# Patient Record
Sex: FEMALE | Race: WHITE | NOT HISPANIC OR LATINO | Employment: FULL TIME | URBAN - METROPOLITAN AREA
[De-identification: names, ages, dates, MRNs, and addresses within clinical notes are randomized per-mention and may not be internally consistent; named-entity substitution may affect disease eponyms.]

---

## 2022-06-06 DIAGNOSIS — G89.29 CHRONIC HEEL PAIN, LEFT: Primary | ICD-10-CM

## 2022-06-06 DIAGNOSIS — M79.672 CHRONIC HEEL PAIN, LEFT: Primary | ICD-10-CM

## 2022-11-14 DIAGNOSIS — J01.90 ACUTE NON-RECURRENT SINUSITIS, UNSPECIFIED LOCATION: Primary | ICD-10-CM

## 2022-11-14 RX ORDER — AZITHROMYCIN 250 MG/1
TABLET, FILM COATED ORAL
Qty: 6 TABLET | Refills: 0 | Status: SHIPPED | OUTPATIENT
Start: 2022-11-14 | End: 2022-11-18

## 2023-01-05 DIAGNOSIS — L05.91 INFECTED PILONIDAL CYST: Primary | ICD-10-CM

## 2023-01-05 RX ORDER — CLINDAMYCIN HYDROCHLORIDE 150 MG/1
150 CAPSULE ORAL 3 TIMES DAILY
Qty: 21 CAPSULE | Refills: 0 | Status: SHIPPED | OUTPATIENT
Start: 2023-01-05 | End: 2023-01-12

## 2023-01-26 ENCOUNTER — ANNUAL EXAM (OUTPATIENT)
Dept: OBGYN CLINIC | Facility: CLINIC | Age: 28
End: 2023-01-26

## 2023-01-26 VITALS
HEIGHT: 65 IN | DIASTOLIC BLOOD PRESSURE: 82 MMHG | WEIGHT: 216 LBS | BODY MASS INDEX: 35.99 KG/M2 | SYSTOLIC BLOOD PRESSURE: 124 MMHG

## 2023-01-26 DIAGNOSIS — Z01.419 WELL WOMAN EXAM WITH ROUTINE GYNECOLOGICAL EXAM: Primary | ICD-10-CM

## 2023-01-26 DIAGNOSIS — Z30.41 ENCOUNTER FOR SURVEILLANCE OF CONTRACEPTIVE PILLS: ICD-10-CM

## 2023-01-26 RX ORDER — NORGESTIMATE AND ETHINYL ESTRADIOL 7DAYSX3 28
1 KIT ORAL DAILY
Qty: 84 TABLET | Refills: 4 | Status: SHIPPED | OUTPATIENT
Start: 2023-01-26

## 2023-01-26 NOTE — PROGRESS NOTES
ASSESSMENT & PLAN:   Diagnoses and all orders for this visit:    Well woman exam with routine gynecological exam    Encounter for surveillance of contraceptive pills  -     Tri-Estarylla 0 18/0 215/0 25 MG-35 MCG per tablet; Take 1 tablet by mouth daily          The following were reviewed in today's visit: ASCCP guidelines,  breast self exam, use and side effects of OCPs, family planning choices, exercise and healthy diet  Patient to return to office in yearly for annual exam      All questions have been answered to her satisfaction  CC:  Annual Gynecologic Examination  Chief Complaint   Patient presents with   • Gynecologic Exam     Pt here today for her annual exam pap 10/21/2021 wnl needs refills of her birth control       HPI: Hyman Lefort is a 33 y o  [place] who presents for annual gynecologic examination  She has the following concerns:  None   this fall  Not yet ready for a family but wants to know if there is a timeline for stopping birth control before trying for pregnancy  Health Maintenance:    Exercise: occasionally  Breast exams/breast awareness: yes  Diet: well balanced diet      Past Medical History:   Diagnosis Date   • GERD (gastroesophageal reflux disease)    • Ovarian cyst     last assessed 06/24/2014       Past Surgical History:   Procedure Laterality Date   • WISDOM TOOTH EXTRACTION  2018       Past OB/Gyn History:  Period Cycle (Days): 28  Period Duration (Days): 4-5  Period Pattern: Regular  Menstrual Flow: Moderate  Menstrual Control: TamponPatient's last menstrual period was 01/15/2023 (exact date)  Last Pap: 10/2021 : no abnormalities  History of abnormal Pap smear: no  HPV vaccine completed: yes    Patient is currently sexually active     STD testing: no  Current contraception: OCP (estrogen/progesterone)      Family History  Family History   Problem Relation Age of Onset   • Gallbladder disease Mother    • Migraines Mother    • Hypertension Father    • Endometriosis Sister         Partial Hyst   • Ulcerative colitis Maternal Grandmother    • Other Maternal Grandfather         cardiac disorder       Family history of uterine or ovarian cancer: no  Family history of breast cancer: no  Family history of colon cancer: no    Social History:  Social History     Socioeconomic History   • Marital status: Single     Spouse name: Not on file   • Number of children: Not on file   • Years of education: Not on file   • Highest education level: Not on file   Occupational History     Employer: [employer]   Tobacco Use   • Smoking status: Never   • Smokeless tobacco: Never   Vaping Use   • Vaping Use: Never used   Substance and Sexual Activity   • Alcohol use: Yes     Comment: socially   • Drug use: Never   • Sexual activity: Yes     Partners: Male     Birth control/protection: OCP   Other Topics Concern   • Not on file   Social History Narrative    Daily caffeine consumption, 1 serving per day    Exercises regularly     Social Determinants of Health     Financial Resource Strain: Not on file   Food Insecurity: Not on file   Transportation Needs: Not on file   Physical Activity: Not on file   Stress: Not on file   Social Connections: Not on file   Intimate Partner Violence: Not on file   Housing Stability: Not on file     Domestic violence screen: negative    Allergies: Allergies   Allergen Reactions   • Augmentin [Amoxicillin-Pot Clavulanate] Hives     Pt  Breaks up in hives       Medications:    Current Outpatient Medications:   •  Tri-Estarylla 0 18/0 215/0 25 MG-35 MCG per tablet, Take 1 tablet by mouth daily, Disp: 84 tablet, Rfl: 4    Review of Systems:  Denies fevers, chills, unintentional weight loss, excessive fatigue, chest pain, shortness of breath, abdominal pain, nausea, vomiting, urinary incontinence, urinary frequency, vaginal bleeding, vaginal discharge  All other systems negative unless otherwise stated         Physical Exam:  /82 (BP Location: Right arm, Patient Position: Sitting, Cuff Size: Standard)   Ht 5' 5" (1 651 m)   Wt 98 kg (216 lb)   LMP 01/15/2023 (Exact Date)   BMI 35 94 kg/m²  Body mass index is 35 94 kg/m²  GEN: The patient was alert and oriented x3, pleasant well-appearing female in no acute distress  HEENT:  Unremarkable, no anterior or posterior lymphadenopathy, no thyromegaly  CV:  Regular rate and rhythm, normal S1 and S2, no murmurs  RESP:  Clear to auscultation bilaterally, no wheezes, rales or rhonchi  BREAST:  Symmetric breasts with no palpable breast masses or obvious breast lesions  She has no retractions or nipple discharge  She has no axillary abnormalities or palpable masses  GI:  Soft, nontender, non-distended  MSK: bilateral lower extremities are nontender, no edema  : Normal appearing external female genitalia, normal appearing urethral meatus  Normal appearing vaginal epithelium  On bimanual exam, no cervical motion tenderness; uterus is smooth, mobile, nontender 6 weeks size  No tenderness or fullness in the bilateral adnexa

## 2023-02-08 ENCOUNTER — OFFICE VISIT (OUTPATIENT)
Dept: FAMILY MEDICINE CLINIC | Facility: CLINIC | Age: 28
End: 2023-02-08

## 2023-02-08 VITALS
TEMPERATURE: 98 F | SYSTOLIC BLOOD PRESSURE: 126 MMHG | BODY MASS INDEX: 36.52 KG/M2 | DIASTOLIC BLOOD PRESSURE: 82 MMHG | HEART RATE: 102 BPM | WEIGHT: 219.2 LBS | OXYGEN SATURATION: 99 % | HEIGHT: 65 IN

## 2023-02-08 DIAGNOSIS — R10.33 PERIUMBILICAL ABDOMINAL PAIN: Primary | ICD-10-CM

## 2023-02-08 NOTE — PROGRESS NOTES
Name: Alisha Badillo      : 1995      MRN: [id number]  Encounter Provider: IRAIS Herr  Encounter Date: 2023   Encounter department: [address] [place] [address]  Periumbilical abdominal pain  Discussed with patient plan to treat with conservative measures: ice applicstion and NSAIDS  Patient instructed to call if no improvement in 72 hours or symptoms worsen       Subjective      27 y  o female presenting with periumbilical discomfort for the past few days  She reports that the discomfort started after need to pump hard for bowel movement  She reports that she feels pressure and slight bulging sensation dependent on movement  She wants to make sure that she does not have a hernia  She also reports that her acid reflux has been acting up more often and she is using TUMS daily  Review of Systems   Constitutional: Negative  Respiratory: Negative  Cardiovascular: Negative  Gastrointestinal: Positive for abdominal pain  Negative for abdominal distention  Neurological: Negative  Psychiatric/Behavioral: Negative  Current Outpatient Medications on File Prior to Visit   Medication Sig   • Tri-Estarylla 0 18/0 215/0 25 MG-35 MCG per tablet Take 1 tablet by mouth daily       Objective     /82   Pulse 102   Temp 98 °F (36 7 °C)   Ht 5' 5" (1 651 m)   Wt 99 4 kg (219 lb 3 2 oz)   LMP 01/15/2023 (Exact Date)   SpO2 99%   BMI 36 48 kg/m²  (Reviewed)    Physical Exam  Vitals reviewed  Constitutional:       General: She is not in acute distress  Appearance: She is well-developed and well-groomed  She is not ill-appearing  HENT:      Head: Normocephalic and atraumatic  Eyes:      General: Lids are normal       Extraocular Movements: Extraocular movements intact  Conjunctiva/sclera: Conjunctivae normal       Pupils: Pupils are equal, round, and reactive to light     Neck:      Trachea: Trachea and phonation normal  Cardiovascular:      Rate and Rhythm: Normal rate and regular rhythm  Heart sounds: Normal heart sounds  Pulmonary:      Effort: Pulmonary effort is normal       Breath sounds: Normal breath sounds  Abdominal:      General: Bowel sounds are normal       Palpations: Abdomen is soft  Tenderness: There is abdominal tenderness in the periumbilical area  There is no guarding or rebound  Hernia: No hernia is present  Skin:     General: Skin is warm and dry  Capillary Refill: Capillary refill takes less than 2 seconds  Neurological:      General: No focal deficit present  Mental Status: She is alert and oriented to person, place, and time  Psychiatric:         Mood and Affect: Mood normal          Behavior: Behavior normal  Behavior is cooperative  Thought Content:  Thought content normal        Artist IRAIS Huber

## 2023-04-24 ENCOUNTER — RA CDI HCC (OUTPATIENT)
Dept: OTHER | Facility: HOSPITAL | Age: 28
End: 2023-04-24

## 2023-04-24 NOTE — PROGRESS NOTES
[facility]  [facility]       Chart reviewed, no opportunity found: CHART REVIEWED, NO OPPORTUNITY FOUND     Patients Insurance     Commercial Insurance: [address] [place] [address]

## 2023-05-08 ENCOUNTER — OFFICE VISIT (OUTPATIENT)
Dept: FAMILY MEDICINE CLINIC | Facility: CLINIC | Age: 28
End: 2023-05-08

## 2023-05-08 VITALS
DIASTOLIC BLOOD PRESSURE: 82 MMHG | HEART RATE: 103 BPM | SYSTOLIC BLOOD PRESSURE: 122 MMHG | BODY MASS INDEX: 36.65 KG/M2 | HEIGHT: 65 IN | TEMPERATURE: 97.3 F | WEIGHT: 220 LBS | OXYGEN SATURATION: 98 %

## 2023-05-08 DIAGNOSIS — Z13.6 SCREENING FOR CARDIOVASCULAR, RESPIRATORY, AND GENITOURINARY DISEASES: ICD-10-CM

## 2023-05-08 DIAGNOSIS — Z00.00 ANNUAL PHYSICAL EXAM: Primary | ICD-10-CM

## 2023-05-08 DIAGNOSIS — Z13.89 SCREENING FOR CARDIOVASCULAR, RESPIRATORY, AND GENITOURINARY DISEASES: ICD-10-CM

## 2023-05-08 DIAGNOSIS — Z13.83 SCREENING FOR CARDIOVASCULAR, RESPIRATORY, AND GENITOURINARY DISEASES: ICD-10-CM

## 2023-05-08 DIAGNOSIS — F41.1 GAD (GENERALIZED ANXIETY DISORDER): ICD-10-CM

## 2023-05-08 DIAGNOSIS — K21.9 GASTROESOPHAGEAL REFLUX DISEASE, UNSPECIFIED WHETHER ESOPHAGITIS PRESENT: ICD-10-CM

## 2023-05-08 RX ORDER — PANTOPRAZOLE SODIUM 20 MG/1
20 TABLET, DELAYED RELEASE ORAL
Qty: 30 TABLET | Refills: 1 | Status: SHIPPED | OUTPATIENT
Start: 2023-05-08 | End: 2023-11-04

## 2023-05-08 RX ORDER — BUSPIRONE HYDROCHLORIDE 5 MG/1
TABLET ORAL
Qty: 120 TABLET | Refills: 1 | Status: SHIPPED | OUTPATIENT
Start: 2023-05-08

## 2023-05-08 NOTE — ASSESSMENT & PLAN NOTE
Discussed with pt  Discussed started GERD diet  Start pantoprazole 20mg qd   Recheck 4-6w - earlier if not improving or worsening

## 2023-05-08 NOTE — ASSESSMENT & PLAN NOTE
I reviewed with pt  Start Buspar 5mg bid and taper up to 10mg bid  I reviewed side effects   Recheck 4-6w

## 2023-05-08 NOTE — PROGRESS NOTES
ADULT ANNUAL [address]    NAME: Shravan Spain  AGE: 33 y o  SEX: female  : 1995     DATE: 2023     Assessment and Plan:     Problem List Items Addressed This Visit        Digestive    Gastroesophageal reflux disease     Discussed with pt  Discussed started GERD diet  Start pantoprazole 20mg qd  Recheck 4-6w - earlier if not improving or worsening         Relevant Medications    pantoprazole (PROTONIX) 20 mg tablet    Other Relevant Orders    CBC and differential    Comprehensive metabolic panel       Other    TONEY (generalized anxiety disorder)     I reviewed with pt  Start Buspar 5mg bid and taper up to 10mg bid  I reviewed side effects  Recheck 4-6w         Relevant Medications    busPIRone (BUSPAR) 5 mg tablet    Other Relevant Orders    TSH, 3rd generation with Free T4 reflex   Other Visit Diagnoses     Annual physical exam    -  Primary    Screening for cardiovascular, respiratory, and genitourinary diseases        Relevant Orders    Comprehensive metabolic panel    Lipid panel          Immunizations and preventive care screenings were discussed with patient today  Appropriate education was printed on patient's after visit summary  Counseling:  · Exercise: the importance of regular exercise/physical activity was discussed  Recommend exercise 3-5 times per week for at least 30 minutes  No follow-ups on file  Chief Complaint:     Chief Complaint   Patient presents with   • Annual Exam   • Heartburn   • Anxiety      History of Present Illness:     Adult Annual Physical   Patient here for a comprehensive physical exam  The patient reports as below  - pt with frequent GERD symptoms, usually after lunch  Improves with TUMS  Pt does eat out a lot  Has tried to improve her diet recently which may have helped  - pt states that she has always had mild anxiety but finds that of the last year it has worsened    Patient now has difficulty with sleep, as well as increased anxiety in multiple areas including work  TONEY-7 done    Diet and Physical Activity  · Diet/Nutrition: poor diet and frequent junk food  · Exercise: no formal exercise  Depression Screening  PHQ-2/9 Depression Screening    Little interest or pleasure in doing things: 0 - not at all  Feeling down, depressed, or hopeless: 0 - not at all  PHQ-2 Score: 0  PHQ-2 Interpretation: Negative depression screen       General Health  · Sleep: sleeps poorly and gets 7-8 hours of sleep on average  · Hearing: normal - bilateral   · Vision: no vision problems  · Dental: regular dental visits and brushes teeth twice daily  /GYN Health  · Last menstrual period:at present  · Contraceptive method: oral contraceptives  · History of STDs?: no      Review of Systems:     Review of Systems   Constitutional: Negative  HENT: Negative  Eyes: Negative  Respiratory: Negative  Cardiovascular: Negative  Gastrointestinal: Negative for abdominal distention, abdominal pain, constipation, diarrhea, nausea and vomiting         (+) GERD   Endocrine: Negative  Genitourinary: Negative  Musculoskeletal: Negative  Skin: Negative  Allergic/Immunologic: Negative  Neurological: Negative  Hematological: Negative  Psychiatric/Behavioral: Positive for sleep disturbance  Negative for decreased concentration, dysphoric mood, self-injury and suicidal ideas  The patient is nervous/anxious         Past Medical History:     Past Medical History:   Diagnosis Date   • GERD (gastroesophageal reflux disease)    • Headache(784 0)     Stress headaches   • Ovarian cyst     last assessed 06/24/2014      Past Surgical History:     Past Surgical History:   Procedure Laterality Date   • WISDOM TOOTH EXTRACTION  2018      Social History:     Social History     Socioeconomic History   • Marital status: Single     Spouse name: None   • Number of children: None   • Years of education: None   • Highest education level: None   Occupational History     Employer: [employer]   Tobacco Use   • Smoking status: Never   • Smokeless tobacco: Never   Vaping Use   • Vaping Use: Never used   Substance and Sexual Activity   • Alcohol use: Yes     Alcohol/week: 2 0 standard drinks     Types: 2 Glasses of wine per week     Comment: I drink socially   • Drug use: Never   • Sexual activity: Yes     Partners: Male     Birth control/protection: OCP   Other Topics Concern   • None   Social History Narrative    Daily caffeine consumption, 1 serving per day    Exercises regularly     Social Determinants of Health     Financial Resource Strain: Not on file   Food Insecurity: Not on file   Transportation Needs: Not on file   Physical Activity: Not on file   Stress: Not on file   Social Connections: Not on file   Intimate Partner Violence: Not on file   Housing Stability: Not on file      Family History:     Family History   Problem Relation Age of Onset   • Gallbladder disease Mother    • Migraines Mother    • Hypertension Father    • Asthma Father    • Endometriosis Sister         Partial Hyst   • Ulcerative colitis Maternal Grandmother    • Other Maternal Grandfather         cardiac disorder      Current Medications:     Current Outpatient Medications   Medication Sig Dispense Refill   • busPIRone (BUSPAR) 5 mg tablet 1 tablet twice daily x4 days, then start 1-1/2 tab twice daily x4 days, then start 2 tabs twice daily 120 tablet 1   • pantoprazole (PROTONIX) 20 mg tablet Take 1 tablet (20 mg total) by mouth daily before breakfast 30 tablet 1   • Tri-Estarylla 0 18/0 215/0 25 MG-35 MCG per tablet Take 1 tablet by mouth daily 84 tablet 4     No current facility-administered medications for this visit  Allergies: Allergies   Allergen Reactions   • Augmentin [Amoxicillin-Pot Clavulanate] Hives     Pt   Breaks up in hives      Physical Exam:     /82 (BP Location: Left arm, Patient "Position: Sitting, Cuff Size: Large)   Pulse 103   Temp (!) 97 3 °F (36 3 °C)   Ht 5' 5\" (1 651 m)   Wt 99 8 kg (220 lb)   SpO2 98%   BMI 36 61 kg/m²     Physical Exam  Vitals reviewed  Constitutional:       Appearance: She is well-developed  HENT:      Head: Normocephalic and atraumatic  Right Ear: Tympanic membrane, ear canal and external ear normal       Left Ear: Tympanic membrane, ear canal and external ear normal       Nose: Nose normal       Mouth/Throat:      Mouth: Mucous membranes are moist    Eyes:      Extraocular Movements: Extraocular movements intact  Conjunctiva/sclera: Conjunctivae normal       Pupils: Pupils are equal, round, and reactive to light  Neck:      Thyroid: No thyromegaly  Vascular: No JVD  Comments: No thyromegaly  Cardiovascular:      Rate and Rhythm: Normal rate and regular rhythm  Pulses: Normal pulses  Heart sounds: Normal heart sounds  No murmur heard  Pulmonary:      Effort: Pulmonary effort is normal       Breath sounds: Normal breath sounds  No wheezing  Abdominal:      General: Bowel sounds are normal  There is no distension  Palpations: Abdomen is soft  There is no mass  Tenderness: There is no abdominal tenderness  Musculoskeletal:         General: No swelling, tenderness or deformity  Normal range of motion  Cervical back: Normal range of motion and neck supple  No tenderness  No muscular tenderness  Right lower leg: No edema  Left lower leg: No edema  Lymphadenopathy:      Cervical: No cervical adenopathy  Skin:     General: Skin is warm  Capillary Refill: Capillary refill takes less than 2 seconds  Neurological:      General: No focal deficit present  Mental Status: She is alert and oriented to person, place, and time  Cranial Nerves: No cranial nerve deficit  Sensory: No sensory deficit  Motor: No weakness or abnormal muscle tone        Coordination: Coordination normal  " Gait: Gait normal       Deep Tendon Reflexes: Reflexes normal    Psychiatric:         Mood and Affect: Mood normal          Behavior: Behavior normal          Thought Content: Thought content normal          Judgment: Judgment normal       Comments: PHQ-2/9 Depression Screening    Little interest or pleasure in doing things: 0 - not at all  Feeling down, depressed, or hopeless: 0 - not at all  PHQ-2 Score: 0  PHQ-2 Interpretation: Negative depression screen     TONEY-7 Flowsheet Screening    Flowsheet Row Most Recent Value   Over the last 2 weeks, how often have you been bothered by any of the   following problems?     Feeling nervous, anxious, or on edge 2   Not being able to stop or control worrying 3   Worrying too much about different things 2   Trouble relaxing 2   Being so restless that it is hard to sit still 0   Becoming easily annoyed or irritable 3   Feeling afraid as if something awful might happen 2   TONEY-7 Total Score 14           MD Issa MoralesValleywise [facility]

## 2023-06-20 ENCOUNTER — OFFICE VISIT (OUTPATIENT)
Dept: FAMILY MEDICINE CLINIC | Facility: CLINIC | Age: 28
End: 2023-06-20
Payer: COMMERCIAL

## 2023-06-20 VITALS
WEIGHT: 219 LBS | SYSTOLIC BLOOD PRESSURE: 110 MMHG | OXYGEN SATURATION: 98 % | TEMPERATURE: 96.2 F | DIASTOLIC BLOOD PRESSURE: 70 MMHG | BODY MASS INDEX: 36.49 KG/M2 | HEIGHT: 65 IN | HEART RATE: 78 BPM

## 2023-06-20 DIAGNOSIS — K21.9 GASTROESOPHAGEAL REFLUX DISEASE, UNSPECIFIED WHETHER ESOPHAGITIS PRESENT: ICD-10-CM

## 2023-06-20 DIAGNOSIS — F41.1 GAD (GENERALIZED ANXIETY DISORDER): Primary | ICD-10-CM

## 2023-06-20 PROCEDURE — 99214 OFFICE O/P EST MOD 30 MIN: CPT | Performed by: FAMILY MEDICINE

## 2023-06-20 NOTE — PROGRESS NOTES
Name: Alcides Levine      : 1995      MRN: [id number]  Encounter Provider: Carleen Howell MD  Encounter Date: 2023   Encounter department: [address] [place] [address] Road     1  TONEY (generalized anxiety disorder)  Assessment & Plan:  Patient doing very well on BuSpar 7 5 mg p o  twice daily  TONEY 7 score showed minimal anxiety  Continue present treatment  Recheck 6 months-earlier if needed      2  Gastroesophageal reflux disease, unspecified whether esophagitis present  Assessment & Plan:  Good control  Patient has been on this dose for 2 months  I will have her decrease pantoprazole to 10 mg a day  If doing well after 2 to 3 weeks, will switch her to an H2 blocker  Patient to call in 2 to 3 weeks with follow-up and return to office in 6 months-earlier if needed             Subjective     f/u multiple med issues  -Patient states that she is doing very well from an anxiety standpoint  She did not feel well on BuSpar 10 mg twice daily and has cut the dose back to 7 5 mg twice daily  Patient states that on this dose her anxiety is well controlled  PHQ and TONEY-7 done  -Patient also states that she has been stable in regards to her reflux since starting Protonix 20 mg a day  She has not needed Tums or any other rescue medication since starting this  She has been on this dose for 2 months  She denies any side effects     -Patient denies any cardiovascular, respiratory,  or other symptoms     -Patient has not had blood work done yet-    Review of Systems   Constitutional: Negative  HENT: Negative  Eyes: Negative  Respiratory: Negative  Cardiovascular: Negative  Gastrointestinal: Negative  Endocrine: Negative  Genitourinary: Negative  Musculoskeletal: Negative  Skin: Negative  Allergic/Immunologic: Negative  Neurological: Negative  Hematological: Negative  Psychiatric/Behavioral: Negative          Past Medical History: Diagnosis Date   • GERD (gastroesophageal reflux disease)    • Headache(784 0)     Stress headaches   • Ovarian cyst     last assessed 06/24/2014     Past Surgical History:   Procedure Laterality Date   • WISDOM TOOTH EXTRACTION  2018     Family History   Problem Relation Age of Onset   • Gallbladder disease Mother    • Migraines Mother    • Hypertension Father    • Asthma Father    • Endometriosis Sister         Partial Hyst   • Ulcerative colitis Maternal Grandmother    • Other Maternal Grandfather         cardiac disorder     Social History     Socioeconomic History   • Marital status: Single     Spouse name: None   • Number of children: None   • Years of education: None   • Highest education level: None   Occupational History     Employer: [employer]   Tobacco Use   • Smoking status: Never   • Smokeless tobacco: Never   Vaping Use   • Vaping Use: Never used   Substance and Sexual Activity   • Alcohol use:  Yes     Alcohol/week: 2 0 standard drinks of alcohol     Types: 2 Glasses of wine per week     Comment: I drink socially   • Drug use: Never   • Sexual activity: Yes     Partners: Male     Birth control/protection: OCP   Other Topics Concern   • None   Social History Narrative    Daily caffeine consumption, 1 serving per day    Exercises regularly     Social Determinants of Health     Financial Resource Strain: Not on file   Food Insecurity: Not on file   Transportation Needs: Not on file   Physical Activity: Not on file   Stress: Not on file   Social Connections: Not on file   Intimate Partner Violence: Not on file   Housing Stability: Not on file     Current Outpatient Medications on File Prior to Visit   Medication Sig   • busPIRone (BUSPAR) 5 mg tablet 1 tablet twice daily x4 days, then start 1-1/2 tab twice daily x4 days, then start 2 tabs twice daily   • pantoprazole (PROTONIX) 20 mg tablet Take 1 tablet (20 mg total) by mouth daily before breakfast   • Tri-Estarylla 0 18/0 215/0 25 "MG-35 MCG per tablet Take 1 tablet by mouth daily     Allergies   Allergen Reactions   • Augmentin [Amoxicillin-Pot Clavulanate] Hives     Pt  Breaks up in hives     Immunization History   Administered Date(s) Administered   • COVID-19 PFIZER VACCINE 0 3 ML IM 03/27/2021, 04/17/2021, 11/20/2021   • DTP 1995, 1995, 1995, 12/11/1997   • DTaP 5 05/30/2001   • HPV Quadrivalent 01/07/2010, 03/10/2010, 07/13/2010   • Hep B, adult 1995, 1996, 03/11/1997   • Hib (PRP-OMP) 1996, 1996, 1996, 12/11/1997   • IPV 1995, 1996, 1996, 05/30/2001   • MMR 08/25/1997, 05/30/2001   • Meningococcal, Unknown Serogroups 05/29/2008, 06/11/2014   • Tdap 05/29/2008, 06/11/2014   • Tuberculin Skin Test-PPD Intradermal 05/06/1997   • Varicella 02/09/1998, 05/28/2008       Objective     /70 (BP Location: Left arm, Patient Position: Sitting, Cuff Size: Adult)   Pulse 78   Temp (!) 96 2 °F (35 7 °C)   Ht 5' 5\" (1 651 m)   Wt 99 3 kg (219 lb)   SpO2 98%   BMI 36 44 kg/m²     Physical Exam  Vitals reviewed  HENT:      Mouth/Throat:      Mouth: Mucous membranes are moist    Eyes:      Extraocular Movements: Extraocular movements intact  Conjunctiva/sclera: Conjunctivae normal       Pupils: Pupils are equal, round, and reactive to light  Cardiovascular:      Rate and Rhythm: Normal rate and regular rhythm  Pulses: Normal pulses  Pulmonary:      Effort: Pulmonary effort is normal    Abdominal:      General: There is no distension  Palpations: There is no mass  Tenderness: There is no abdominal tenderness  Musculoskeletal:         General: No swelling or tenderness  Cervical back: No tenderness  Right lower leg: No edema  Left lower leg: No edema  Lymphadenopathy:      Cervical: No cervical adenopathy  Skin:     General: Skin is warm  Capillary Refill: Capillary refill takes less than 2 seconds     Neurological:      General: " No focal deficit present  Mental Status: She is alert and oriented to person, place, and time  Psychiatric:         Mood and Affect: Mood normal          Behavior: Behavior normal          Thought Content: Thought content normal          Judgment: Judgment normal       Comments: PHQ-2/9 Depression Screening    Little interest or pleasure in doing things: 0 - not at all  Feeling down, depressed, or hopeless: 0 - not at all  PHQ-2 Score: 0  PHQ-2 Interpretation: Negative depression screen     TONEY-7 Flowsheet Screening    Flowsheet Row Most Recent Value   Over the last 2 weeks, how often have you been bothered by any of the   following problems?     Feeling nervous, anxious, or on edge 1   Not being able to stop or control worrying 0   Worrying too much about different things 0   Trouble relaxing 0   Being so restless that it is hard to sit still 0   Becoming easily annoyed or irritable 1   Feeling afraid as if something awful might happen 1   TONEY-7 Total Score 3        Betty Hernández MD

## 2023-06-20 NOTE — ASSESSMENT & PLAN NOTE
Good control  Patient has been on this dose for 2 months  I will have her decrease pantoprazole to 10 mg a day  If doing well after 2 to 3 weeks, will switch her to an H2 blocker    Patient to call in 2 to 3 weeks with follow-up and return to office in 6 months-earlier if needed

## 2023-06-20 NOTE — ASSESSMENT & PLAN NOTE
Patient doing very well on BuSpar 7 5 mg p o  twice daily  TONEY 7 score showed minimal anxiety  Continue present treatment    Recheck 6 months-earlier if needed

## 2023-07-03 DIAGNOSIS — K21.9 GASTROESOPHAGEAL REFLUX DISEASE, UNSPECIFIED WHETHER ESOPHAGITIS PRESENT: ICD-10-CM

## 2023-07-03 DIAGNOSIS — F41.1 GAD (GENERALIZED ANXIETY DISORDER): ICD-10-CM

## 2023-07-03 RX ORDER — PANTOPRAZOLE SODIUM 20 MG/1
TABLET, DELAYED RELEASE ORAL
Qty: 30 TABLET | Refills: 1 | Status: SHIPPED | OUTPATIENT
Start: 2023-07-03

## 2023-07-03 RX ORDER — BUSPIRONE HYDROCHLORIDE 5 MG/1
TABLET ORAL
Qty: 120 TABLET | Refills: 1 | Status: SHIPPED | OUTPATIENT
Start: 2023-07-03

## 2023-07-30 LAB
ALBUMIN SERPL-MCNC: 4 G/DL (ref 3.6–5.1)
ALBUMIN/GLOB SERPL: 1.5 (CALC) (ref 1–2.5)
ALP SERPL-CCNC: 68 U/L (ref 31–125)
ALT SERPL-CCNC: 22 U/L (ref 6–29)
AST SERPL-CCNC: 17 U/L (ref 10–30)
BASOPHILS # BLD AUTO: 54 CELLS/UL (ref 0–200)
BASOPHILS NFR BLD AUTO: 0.6 %
BILIRUB SERPL-MCNC: 0.6 MG/DL (ref 0.2–1.2)
BUN SERPL-MCNC: 12 MG/DL (ref 7–25)
BUN/CREAT SERPL: NORMAL (CALC) (ref 6–22)
CALCIUM SERPL-MCNC: 9.2 MG/DL (ref 8.6–10.2)
CHLORIDE SERPL-SCNC: 104 MMOL/L (ref 98–110)
CHOLEST SERPL-MCNC: 243 MG/DL
CHOLEST/HDLC SERPL: 4.4 (CALC)
CO2 SERPL-SCNC: 23 MMOL/L (ref 20–32)
CREAT SERPL-MCNC: 0.8 MG/DL (ref 0.5–0.96)
EOSINOPHIL # BLD AUTO: 99 CELLS/UL (ref 15–500)
EOSINOPHIL NFR BLD AUTO: 1.1 %
ERYTHROCYTE [DISTWIDTH] IN BLOOD BY AUTOMATED COUNT: 13.2 % (ref 11–15)
GFR/BSA.PRED SERPLBLD CYS-BASED-ARV: 104 ML/MIN/1.73M2
GLOBULIN SER CALC-MCNC: 2.7 G/DL (CALC) (ref 1.9–3.7)
GLUCOSE SERPL-MCNC: 89 MG/DL (ref 65–99)
HCT VFR BLD AUTO: 40.2 % (ref 35–45)
HDLC SERPL-MCNC: 55 MG/DL
HGB BLD-MCNC: 13.5 G/DL (ref 11.7–15.5)
LDLC SERPL CALC-MCNC: 155 MG/DL (CALC)
LYMPHOCYTES # BLD AUTO: 2916 CELLS/UL (ref 850–3900)
LYMPHOCYTES NFR BLD AUTO: 32.4 %
MCH RBC QN AUTO: 30.6 PG (ref 27–33)
MCHC RBC AUTO-ENTMCNC: 33.6 G/DL (ref 32–36)
MCV RBC AUTO: 91.2 FL (ref 80–100)
MONOCYTES # BLD AUTO: 621 CELLS/UL (ref 200–950)
MONOCYTES NFR BLD AUTO: 6.9 %
NEUTROPHILS # BLD AUTO: 5310 CELLS/UL (ref 1500–7800)
NEUTROPHILS NFR BLD AUTO: 59 %
NONHDLC SERPL-MCNC: 188 MG/DL (CALC)
PLATELET # BLD AUTO: 243 THOUSAND/UL (ref 140–400)
PMV BLD REES-ECKER: 11 FL (ref 7.5–12.5)
POTASSIUM SERPL-SCNC: 4.3 MMOL/L (ref 3.5–5.3)
PROT SERPL-MCNC: 6.7 G/DL (ref 6.1–8.1)
RBC # BLD AUTO: 4.41 MILLION/UL (ref 3.8–5.1)
SODIUM SERPL-SCNC: 138 MMOL/L (ref 135–146)
TRIGL SERPL-MCNC: 189 MG/DL
TSH SERPL-ACNC: 2.36 MIU/L
WBC # BLD AUTO: 9 THOUSAND/UL (ref 3.8–10.8)

## 2023-08-15 ENCOUNTER — APPOINTMENT (EMERGENCY)
Dept: CT IMAGING | Facility: HOSPITAL | Age: 28
End: 2023-08-15
Payer: COMMERCIAL

## 2023-08-15 ENCOUNTER — HOSPITAL ENCOUNTER (EMERGENCY)
Facility: HOSPITAL | Age: 28
Discharge: HOME | End: 2023-08-15
Attending: EMERGENCY MEDICINE
Payer: COMMERCIAL

## 2023-08-15 ENCOUNTER — APPOINTMENT (EMERGENCY)
Dept: ULTRASOUND IMAGING | Facility: HOSPITAL | Age: 28
End: 2023-08-15
Payer: COMMERCIAL

## 2023-08-15 VITALS
TEMPERATURE: 98.4 F | HEART RATE: 96 BPM | RESPIRATION RATE: 18 BRPM | DIASTOLIC BLOOD PRESSURE: 77 MMHG | OXYGEN SATURATION: 96 % | SYSTOLIC BLOOD PRESSURE: 137 MMHG

## 2023-08-15 DIAGNOSIS — N39.0 UTI (URINARY TRACT INFECTION): ICD-10-CM

## 2023-08-15 DIAGNOSIS — R10.32 LLQ ABDOMINAL PAIN: Primary | ICD-10-CM

## 2023-08-15 LAB
ALBUMIN SERPL BCP-MCNC: 4.1 G/DL (ref 3.5–5)
ALP SERPL-CCNC: 65 U/L (ref 34–104)
ALT SERPL W P-5'-P-CCNC: 21 U/L (ref 7–52)
ANION GAP SERPL CALCULATED.3IONS-SCNC: 9 MMOL/L
AST SERPL W P-5'-P-CCNC: 18 U/L (ref 13–39)
BACTERIA UR QL AUTO: ABNORMAL /HPF
BASOPHILS # BLD AUTO: 0.04 THOUSANDS/ÂΜL (ref 0–0.1)
BASOPHILS NFR BLD AUTO: 0 % (ref 0–1)
BILIRUB SERPL-MCNC: 0.43 MG/DL (ref 0.2–1)
BILIRUB UR QL STRIP: NEGATIVE
BUN SERPL-MCNC: 9 MG/DL (ref 5–25)
CALCIUM SERPL-MCNC: 8.7 MG/DL (ref 8.4–10.2)
CHLORIDE SERPL-SCNC: 104 MMOL/L (ref 96–108)
CLARITY UR: ABNORMAL
CO2 SERPL-SCNC: 23 MMOL/L (ref 21–32)
COLOR UR: ABNORMAL
CREAT SERPL-MCNC: 0.78 MG/DL (ref 0.6–1.3)
EOSINOPHIL # BLD AUTO: 0.04 THOUSAND/ÂΜL (ref 0–0.61)
EOSINOPHIL NFR BLD AUTO: 0 % (ref 0–6)
ERYTHROCYTE [DISTWIDTH] IN BLOOD BY AUTOMATED COUNT: 13.6 % (ref 11.6–15.1)
EXT PREGNANCY TEST URINE: NEGATIVE
EXT. CONTROL: NORMAL
GFR SERPL CREATININE-BSD FRML MDRD: 104 ML/MIN/1.73SQ M
GLUCOSE SERPL-MCNC: 101 MG/DL (ref 65–140)
GLUCOSE UR STRIP-MCNC: NEGATIVE MG/DL
HCT VFR BLD AUTO: 39 % (ref 34.8–46.1)
HGB BLD-MCNC: 12.8 G/DL (ref 11.5–15.4)
HGB UR QL STRIP.AUTO: NEGATIVE
IMM GRANULOCYTES # BLD AUTO: 0.03 THOUSAND/UL (ref 0–0.2)
IMM GRANULOCYTES NFR BLD AUTO: 0 % (ref 0–2)
KETONES UR STRIP-MCNC: NEGATIVE MG/DL
LEUKOCYTE ESTERASE UR QL STRIP: NEGATIVE
LIPASE SERPL-CCNC: 15 U/L (ref 11–82)
LYMPHOCYTES # BLD AUTO: 3.02 THOUSANDS/ÂΜL (ref 0.6–4.47)
LYMPHOCYTES NFR BLD AUTO: 28 % (ref 14–44)
MCH RBC QN AUTO: 30 PG (ref 26.8–34.3)
MCHC RBC AUTO-ENTMCNC: 32.8 G/DL (ref 31.4–37.4)
MCV RBC AUTO: 91 FL (ref 82–98)
MONOCYTES # BLD AUTO: 0.74 THOUSAND/ÂΜL (ref 0.17–1.22)
MONOCYTES NFR BLD AUTO: 7 % (ref 4–12)
MUCOUS THREADS UR QL AUTO: ABNORMAL
NEUTROPHILS # BLD AUTO: 6.9 THOUSANDS/ÂΜL (ref 1.85–7.62)
NEUTS SEG NFR BLD AUTO: 65 % (ref 43–75)
NITRITE UR QL STRIP: NEGATIVE
NON-SQ EPI CELLS URNS QL MICRO: ABNORMAL /HPF
NRBC BLD AUTO-RTO: 0 /100 WBCS
PH UR STRIP.AUTO: 6 [PH]
PLATELET # BLD AUTO: 220 THOUSANDS/UL (ref 149–390)
PMV BLD AUTO: 10.5 FL (ref 8.9–12.7)
POTASSIUM SERPL-SCNC: 3.5 MMOL/L (ref 3.5–5.3)
PROT SERPL-MCNC: 7 G/DL (ref 6.4–8.4)
PROT UR STRIP-MCNC: ABNORMAL MG/DL
RBC # BLD AUTO: 4.27 MILLION/UL (ref 3.81–5.12)
RBC #/AREA URNS AUTO: ABNORMAL /HPF
SODIUM SERPL-SCNC: 136 MMOL/L (ref 135–147)
SP GR UR STRIP.AUTO: 1.02 (ref 1–1.03)
UROBILINOGEN UR STRIP-ACNC: <2 MG/DL
WBC # BLD AUTO: 10.77 THOUSAND/UL (ref 4.31–10.16)
WBC #/AREA URNS AUTO: ABNORMAL /HPF

## 2023-08-15 PROCEDURE — 74176 CT ABD & PELVIS W/O CONTRAST: CPT

## 2023-08-15 PROCEDURE — 99284 EMERGENCY DEPT VISIT MOD MDM: CPT | Performed by: PHYSICIAN ASSISTANT

## 2023-08-15 PROCEDURE — 76856 US EXAM PELVIC COMPLETE: CPT

## 2023-08-15 PROCEDURE — 76830 TRANSVAGINAL US NON-OB: CPT

## 2023-08-15 PROCEDURE — 83690 ASSAY OF LIPASE: CPT

## 2023-08-15 PROCEDURE — 85025 COMPLETE CBC W/AUTO DIFF WBC: CPT

## 2023-08-15 PROCEDURE — 81001 URINALYSIS AUTO W/SCOPE: CPT | Performed by: PHYSICIAN ASSISTANT

## 2023-08-15 PROCEDURE — 36415 COLL VENOUS BLD VENIPUNCTURE: CPT

## 2023-08-15 PROCEDURE — 99284 EMERGENCY DEPT VISIT MOD MDM: CPT

## 2023-08-15 PROCEDURE — 81025 URINE PREGNANCY TEST: CPT | Performed by: PHYSICIAN ASSISTANT

## 2023-08-15 PROCEDURE — G1004 CDSM NDSC: HCPCS

## 2023-08-15 PROCEDURE — 96374 THER/PROPH/DIAG INJ IV PUSH: CPT

## 2023-08-15 PROCEDURE — 80053 COMPREHEN METABOLIC PANEL: CPT

## 2023-08-15 RX ORDER — CEFUROXIME AXETIL 500 MG/1
500 TABLET ORAL EVERY 12 HOURS SCHEDULED
Qty: 10 TABLET | Refills: 0 | Status: SHIPPED | OUTPATIENT
Start: 2023-08-15 | End: 2023-08-20

## 2023-08-15 RX ORDER — KETOROLAC TROMETHAMINE 30 MG/ML
15 INJECTION, SOLUTION INTRAMUSCULAR; INTRAVENOUS ONCE
Status: COMPLETED | OUTPATIENT
Start: 2023-08-15 | End: 2023-08-15

## 2023-08-15 RX ORDER — CEFUROXIME AXETIL 250 MG/1
500 TABLET ORAL EVERY 12 HOURS SCHEDULED
Status: DISCONTINUED | OUTPATIENT
Start: 2023-08-15 | End: 2023-08-16 | Stop reason: HOSPADM

## 2023-08-15 RX ADMIN — CEFUROXIME AXETIL 500 MG: 250 TABLET ORAL at 22:01

## 2023-08-15 RX ADMIN — KETOROLAC TROMETHAMINE 15 MG: 30 INJECTION, SOLUTION INTRAMUSCULAR; INTRAVENOUS at 22:02

## 2023-08-16 NOTE — DISCHARGE INSTRUCTIONS
Take the prescribed antibiotics as directed for the full duration of their course. Schedule an appointment for follow-up with your primary care provider in the next 5 to 7 days. Return to the ER if you develop fever, new or worsening pain, vomiting, difficulty breathing, blood in vomit or stool or urine, weakness, confusion, or lethargy.

## 2023-08-16 NOTE — ED PROVIDER NOTES
History  Chief Complaint   Patient presents with   • Abdominal Pain     Pt reports LLQ abd pain since Friday, hx of ovarian cyst 10 years ago, recently stopped birth control, denie abnormal bleeding, +nausea over weekend, also late for period     81-year-old female presents the emergency department with complaints of abdominal pain. States that she has had some left-sided lower abdominal pain over the past 4 to 5 days. No fevers at home. Denies nausea or vomiting. States that she has a history of ovarian cysts approximately 10 years ago without recurrence. Did recently stop her birth control pill. States that her period is currently 8 by several days. LMP 23. Reports that her periods are fairly regular, and that this is unusual for her. Notes that she has taken several home pregnancy tests which were all negative. Some mild nausea recently without vomiting at home. No fevers. Denies urinary symptoms. History provided by:  Patient   used: No    Abdominal Pain  Pain location:  LLQ  Pain quality: cramping and sharp    Pain radiates to:  Does not radiate  Pain severity:  Moderate  Onset quality:  Gradual  Duration:  5 days  Timing:  Constant  Progression:  Waxing and waning  Chronicity:  New  Relieved by:  Nothing  Worsened by:  Nothing  Ineffective treatments:  OTC medications  Associated symptoms: nausea    Associated symptoms: no anorexia, no belching, no chest pain, no chills, no constipation, no cough, no diarrhea, no dysuria, no fatigue, no fever, no flatus, no hematemesis, no hematochezia, no hematuria, no melena, no shortness of breath, no sore throat, no vaginal bleeding and no vomiting        Prior to Admission Medications   Prescriptions Last Dose Informant Patient Reported? Taking?    Tri-Estarylla 0.18/0.215/0.25 MG-35 MCG per tablet   No No   Sig: Take 1 tablet by mouth daily   busPIRone (BUSPAR) 5 mg tablet   No No   Si TABLET TWICE DAILY X4 DAYS, THEN 1 & /2 TAB TWICE DAILY X 4 DAYS, THEN 2 TABS TWICE DAILY   pantoprazole (PROTONIX) 20 mg tablet   No No   Sig: TAKE 1 TABLET BY MOUTH DAILY BEFORE BREAKFAST. Facility-Administered Medications: None       Past Medical History:   Diagnosis Date   • GERD (gastroesophageal reflux disease)    • Headache(784.0)     Stress headaches   • Ovarian cyst     last assessed 06/24/2014       Past Surgical History:   Procedure Laterality Date   • WISDOM TOOTH EXTRACTION  2018       Family History   Problem Relation Age of Onset   • Gallbladder disease Mother    • Migraines Mother    • Hypertension Father    • Asthma Father    • Endometriosis Sister         Partial Hyst   • Ulcerative colitis Maternal Grandmother    • Other Maternal Grandfather         cardiac disorder     I have reviewed and agree with the history as documented. E-Cigarette/Vaping   • E-Cigarette Use Never User      E-Cigarette/Vaping Substances   • Nicotine No    • THC No    • CBD No    • Flavoring No    • Other No    • Unknown No      Social History     Tobacco Use   • Smoking status: Never   • Smokeless tobacco: Never   Vaping Use   • Vaping Use: Never used   Substance Use Topics   • Alcohol use: Yes     Alcohol/week: 2.0 standard drinks of alcohol     Types: 2 Glasses of wine per week     Comment: I drink socially   • Drug use: Never       Review of Systems   Constitutional: Negative for activity change, appetite change, chills, fatigue and fever. HENT: Negative for congestion, dental problem, drooling, ear discharge, ear pain, mouth sores, nosebleeds, rhinorrhea, sore throat and trouble swallowing. Eyes: Negative for pain, discharge and itching. Respiratory: Negative for cough, chest tightness, shortness of breath and wheezing. Cardiovascular: Negative for chest pain and palpitations. Gastrointestinal: Positive for abdominal pain and nausea.  Negative for anorexia, blood in stool, constipation, diarrhea, flatus, hematemesis, hematochezia, melena and vomiting. Endocrine: Negative for cold intolerance and heat intolerance. Genitourinary: Negative for difficulty urinating, dysuria, flank pain, frequency, hematuria, urgency and vaginal bleeding. Skin: Negative for rash and wound. Allergic/Immunologic: Negative for food allergies and immunocompromised state. Neurological: Negative for dizziness, seizures, syncope, weakness, numbness and headaches. Psychiatric/Behavioral: Negative for agitation, behavioral problems and confusion. Physical Exam  Physical Exam  Vitals and nursing note reviewed. Constitutional:       Appearance: Normal appearance. She is well-developed. HENT:      Head: Normocephalic and atraumatic. Right Ear: Hearing and external ear normal.      Left Ear: Hearing and external ear normal.      Nose: Nose normal.   Eyes:      General: Lids are normal.      Conjunctiva/sclera: Conjunctivae normal.   Neck:      Trachea: Trachea normal.   Cardiovascular:      Rate and Rhythm: Normal rate and regular rhythm. Heart sounds: Normal heart sounds. No murmur heard. No friction rub. No gallop. Pulmonary:      Effort: Pulmonary effort is normal. No respiratory distress. Breath sounds: Normal breath sounds. No decreased breath sounds, wheezing, rhonchi or rales. Abdominal:      Tenderness: There is abdominal tenderness in the left lower quadrant. There is no right CVA tenderness or left CVA tenderness. Musculoskeletal:         General: Normal range of motion. Cervical back: Normal range of motion. Skin:     General: Skin is warm and dry. Findings: No erythema or rash. Neurological:      General: No focal deficit present. Mental Status: She is alert and oriented to person, place, and time.    Psychiatric:         Mood and Affect: Mood normal.         Behavior: Behavior normal.         Vital Signs  ED Triage Vitals   Temperature Pulse Respirations Blood Pressure SpO2   08/15/23 1645 08/15/23 1645 08/15/23 1645 08/15/23 1645 08/15/23 1645   98.4 °F (36.9 °C) (!) 110 18 162/87 96 %      Temp Source Heart Rate Source Patient Position - Orthostatic VS BP Location FiO2 (%)   08/15/23 1645 08/15/23 1645 08/15/23 1645 08/15/23 1645 --   Oral Monitor Sitting Right arm       Pain Score       08/15/23 2202       5           Vitals:    08/15/23 1645 08/15/23 1800 08/15/23 1900 08/15/23 2100   BP: 162/87 143/75 137/77    Pulse: (!) 110 101 104 96   Patient Position - Orthostatic VS: Sitting Sitting Sitting          Visual Acuity      ED Medications  Medications   ketorolac (TORADOL) injection 15 mg (15 mg Intravenous Given 8/15/23 2202)       Diagnostic Studies  Results Reviewed     Procedure Component Value Units Date/Time    Urine Microscopic [[id number]]  (Abnormal) Collected: 08/15/23 1728    Lab Status: Final result Specimen: Urine, Clean Catch Updated: 08/15/23 1823     RBC, UA 1-2 /hpf      WBC, UA 2-4 /hpf      Epithelial Cells Moderate /hpf      Bacteria, UA Innumerable /hpf      MUCUS THREADS Occasional    Comprehensive metabolic panel [[id number]] Collected: 08/15/23 1728    Lab Status: Final result Specimen: Blood from Arm, Right Updated: 08/15/23 1753     Sodium 136 mmol/L      Potassium 3.5 mmol/L      Chloride 104 mmol/L      CO2 23 mmol/L      ANION GAP 9 mmol/L      BUN 9 mg/dL      Creatinine 0.78 mg/dL      Glucose 101 mg/dL      Calcium 8.7 mg/dL      AST 18 U/L      ALT 21 U/L      Alkaline Phosphatase 65 U/L      Total Protein 7.0 g/dL      Albumin 4.1 g/dL      Total Bilirubin 0.43 mg/dL      eGFR 104 ml/min/1.73sq m     Narrative:      [place] guidelines for Chronic Kidney Disease (CKD):   •  Stage 1 with normal or high GFR (GFR > 90 mL/min/1.73 square meters)  •  Stage 2 Mild CKD (GFR = 60-89 mL/min/1.73 square meters)  •  Stage 3A Moderate CKD (GFR = 45-59 mL/min/1.73 square meters)  •  Stage 3B Moderate CKD (GFR = 30-44 mL/min/1.73 square meters)  •  Stage 4 Severe CKD (GFR = 15-29 mL/min/1.73 square meters)  •  Stage 5 End Stage CKD (GFR <15 mL/min/1.73 square meters)  Note: GFR calculation is accurate only with a steady state creatinine    Lipase [[id number]]  (Normal) Collected: 08/15/23 1728    Lab Status: Final result Specimen: Blood from Arm, Right Updated: 08/15/23 1753     Lipase 15 u/L     CBC and differential [[id number]]  (Abnormal) Collected: 08/15/23 1728    Lab Status: Final result Specimen: Blood from Arm, Right Updated: 08/15/23 1743     WBC 10.77 Thousand/uL      RBC 4.27 Million/uL      Hemoglobin 12.8 g/dL      Hematocrit 39.0 %      MCV 91 fL      MCH 30.0 pg      MCHC 32.8 g/dL      RDW 13.6 %      MPV 10.5 fL      Platelets 636 Thousands/uL      nRBC 0 /100 WBCs      Neutrophils Relative 65 %      Immat GRANS % 0 %      Lymphocytes Relative 28 %      Monocytes Relative 7 %      Eosinophils Relative 0 %      Basophils Relative 0 %      Neutrophils Absolute 6.90 Thousands/µL      Immature Grans Absolute 0.03 Thousand/uL      Lymphocytes Absolute 3.02 Thousands/µL      Monocytes Absolute 0.74 Thousand/µL      Eosinophils Absolute 0.04 Thousand/µL      Basophils Absolute 0.04 Thousands/µL     UA w Reflex to Microscopic w Reflex to Culture [[id number]]  (Abnormal) Collected: 08/15/23 1728    Lab Status: Final result Specimen: Urine, Clean Catch Updated: 08/15/23 1738     Color, UA Light Yellow     Clarity, UA Turbid     Specific Gravity, UA 1.019     pH, UA 6.0     Leukocytes, UA Negative     Nitrite, UA Negative     Protein, UA Trace mg/dl      Glucose, UA Negative mg/dl      Ketones, UA Negative mg/dl      Urobilinogen, UA <2.0 mg/dl      Bilirubin, UA Negative     Occult Blood, UA Negative    POCT pregnancy, urine [[id number]]  (Normal) Resulted: 08/15/23 1731    Lab Status: Final result Updated: 08/15/23 1731     EXT Preg Test, Ur Negative     Control Valid                 CT renal stone study abdomen pelvis without contrast   Final Result by Elder Santos MD (08/15 2112)      No evidence of urinary tract calculi. No hydronephrosis. Workstation performed: [id number]         US pelvis complete w transvaginal   Final Result by Luis Angel Benito MD (08/15 1910)      Pelvic ultrasound exam within normal limits. Workstation performed: [id number]                    Procedures  Procedures         ED Course                                             Medical Decision Making  Differential diagnosis includes but not limited to: Ovarian cyst, pregnancy, ectopic pregnancy, urinary tract infection, kidney stone, diverticular disease    LLQ abdominal pain: acute illness or injury  UTI (urinary tract infection): acute illness or injury  Amount and/or Complexity of Data Reviewed  Labs: ordered. Decision-making details documented in ED Course. Radiology: ordered. Decision-making details documented in ED Course. Risk  Prescription drug management. Disposition  Final diagnoses:   LLQ abdominal pain   UTI (urinary tract infection)     Time reflects when diagnosis was documented in both MDM as applicable and the Disposition within this note     Time User Action Codes Description Comment    8/15/2023  9:18 PM Fredricka Jeans [R10.32] LLQ abdominal pain     8/15/2023  9:18 PM [address] [N39.0] UTI (urinary tract infection)       ED Disposition     ED Disposition   Discharge    Condition   Stable    Date/Time   Tue Aug 15, 2023  9:18 PM    [address] discharge to home/self care. Follow-up Information     Follow up With Specialties Details Why Contact Info Additional Information    Galina Goncalves MD [facility] Medicine Schedule an appointment as soon as possible for a visit on 8/21/2023  [address] [facility].   [facility] Emergency Department Emergency Medicine Go to  If symptoms worsen [address] Emergency Department, [facility] [address], [address]          Discharge Medication List as of 8/15/2023  9:29 PM      START taking these medications    Details   cefuroxime (CEFTIN) 500 mg tablet Take 1 tablet (500 mg total) by mouth every 12 (twelve) hours for 5 days, Starting Tue 8/15/2023, Until Sun 8/20/2023, Normal         CONTINUE these medications which have NOT CHANGED    Details   busPIRone (BUSPAR) 5 mg tablet 1 TABLET TWICE DAILY X4 DAYS, THEN 1 & 1/2 TAB TWICE DAILY X 4 DAYS, THEN 2 TABS TWICE DAILY, Normal      pantoprazole (PROTONIX) 20 mg tablet TAKE 1 TABLET BY MOUTH DAILY BEFORE BREAKFAST., Normal      Tri-Estarylla 0.18/0.215/0.25 MG-35 MCG per tablet Take 1 tablet by mouth daily, Starting Thu 1/26/2023, Normal             No discharge procedures on file.     PDMP Review     None          ED Provider  Electronically Signed by           Babak Mckinley PA-C  08/17/23 6048

## 2023-08-16 NOTE — ED CARE HANDOFF
Emergency Department Sign Out Note        [facility] See Separate Emergency Department note. The patient, Julian Meidna, was evaluated by the previous provider for LLQ abdominal pain. Workup Completed:  CMP, CBC, lipase, UA, urine micro    ED Course / Workup Pending (followup):  CT renal stone study                                  ED Course as of 08/16/23 0015   Tue Aug 15, 2023   2107 Comprehensive metabolic panel  No electrolyte derangement, no CARMEN, normal random glucose, no transaminitis   2107 Lipase: 15  Acute pancreatitis less likely   2107 WBC(!): 10.77  Mild leukocytosis   2107 CBC and differential(!)  No gross anemia   2107 UA w Reflex to Microscopic w Reflex to Culture(!)  Trace proteinuria   2107 Urine Microscopic(!)  Pending CT renal stone study, plan for antibiotics for suspected UTI   2107 PREGNANCY TEST URINE: Negative  Noted negative pregnancy test   2108 US pelvis complete w transvaginal  FINDINGS:     UTERUS:  The uterus is anteverted in position, measuring 8.3 x 4.2 x 5.1 cm. The uterus has a normal contour and echotexture. The cervix appears within normal limits.     ENDOMETRIUM:  The endometrial echo complex has an AP caliber of 14.0 mm. Its appearance is within normal limits for age and cycle and shows no filling defects.     OVARIES/ADNEXA:  Right ovary:  4.5 x 1.7 x 1.7 cm. 6.6 mL. Left ovary:  3.7 x 3.0 x 4.0 cm. 23.2 mL. 2.4 x 1.9 x 2 cm dominant follicle left ovary  Ovarian Doppler flow is within normal limits. No suspicious ovarian or adnexal abnormality.     OTHER:  Trace amount of free fluid in the pelvis. IMPRESSION:     Pelvic ultrasound exam within normal limits. 2118 CT renal stone study abdomen pelvis without contrast  URINARY TRACT FINDINGS:     RIGHT KIDNEY AND URETER:  No urinary tract calculi. No hydronephrosis or hydroureter.     LEFT KIDNEY AND URETER:  No urinary tract calculi.   No hydronephrosis or hydroureter.     URINARY BLADDER:  Unremarkable.        ADDITIONAL FINDINGS:     LOWER CHEST:  No clinically significant abnormality identified in the visualized lower chest.     SOLID VISCERA: Limited low radiation dose noncontrast CT evaluation demonstrates no clinically significant abnormality of the imaged portions of the liver, spleen, pancreas, or adrenal glands.     GALLBLADDER/BILIARY TREE:  No calcified gallstones. No pericholecystic inflammatory change. No biliary dilatation.     STOMACH AND BOWEL:  Unremarkable.     APPENDIX: A normal appendix was visualized.     ABDOMINOPELVIC CAVITY:  No ascites. No pneumoperitoneum. No lymphadenopathy.     VESSELS: Unremarkable for patient's age.     REPRODUCTIVE ORGANS:  Unremarkable for patient's age.     ABDOMINAL WALL/INGUINAL REGIONS:  Unremarkable.     OSSEOUS STRUCTURES:  No acute fracture or destructive osseous lesion.     IMPRESSION:     No evidence of urinary tract calculi. No hydronephrosis.      2131 I introduced myself to the patient and notified her I am assuming care from off going [facility]. Patient updated on CT results. She reports having some pain to the left lower quadrant while observed here that has been transient, worse when bearing down for UA sample, and relieved with lying on the left side. Currently very mild and relieved with the current position she is in. Plan for IV Toradol and first dose of cefuroxime for suspected UTI. I discussed small follicle on L ovary and otherwise unremarkable pelvic US. I also reviewed the blood work and UA results once more. Plan for antibiotics x5 days outpatient, follow-up with PCP, and strict return precautions. She and her  are in agreement with plan of no further questions at this time. Patient in no acute distress, and ambulatory with steady gait at time discharge.      Procedures  Medical Decision Making  DDx including but not limited to: UTI, interstitial cystitis, pyelonephritis, ureteral stone, pelvic pathology vaginitis    See ED course for further MDM and disposition discussion. LLQ abdominal pain: acute illness or injury  UTI (urinary tract infection): acute illness or injury  Amount and/or Complexity of Data Reviewed  Independent Historian: spouse  Labs: ordered. Decision-making details documented in ED Course. Radiology: ordered. Decision-making details documented in ED Course. Risk  OTC drugs. Prescription drug management. Disposition  Final diagnoses:   LLQ abdominal pain   UTI (urinary tract infection)     Time reflects when diagnosis was documented in both MDM as applicable and the Disposition within this note     Time User Action Codes Description Comment    8/15/2023  9:18 PM Ranjan Organ [R10.32] LLQ abdominal pain     8/15/2023  9:18 PM [place] [address] [address] [N39.0] UTI (urinary tract infection)       ED Disposition     ED Disposition   Discharge    Condition   Stable    Date/Time   Tue Aug 15, 2023  9:18 PM    [address] discharge to home/self care. Follow-up Information     Follow up With Specialties Details Why Contact Info Additional Information    Danelle Mendieta MD [facility] Medicine Schedule an appointment as soon as possible for a visit on 8/21/2023  [address] [facility].   [facility] Emergency Department Emergency Medicine Go to  If symptoms worsen [address] Emergency Department, [place], [place], [address]        Discharge Medication List as of 8/15/2023  9:29 PM      START taking these medications    Details   cefuroxime (CEFTIN) 500 mg tablet Take 1 tablet (500 mg total) by mouth every 12 (twelve) hours for 5 days, Starting Tue 8/15/2023, Until Sun 8/20/2023, Normal         CONTINUE these medications which have NOT CHANGED    Details busPIRone (BUSPAR) 5 mg tablet 1 TABLET TWICE DAILY X4 DAYS, THEN 1 & 1/2 TAB TWICE DAILY X 4 DAYS, THEN 2 TABS TWICE DAILY, Normal      pantoprazole (PROTONIX) 20 mg tablet TAKE 1 TABLET BY MOUTH DAILY BEFORE BREAKFAST., Normal      Tri-Estarylla 0.18/0.215/0.25 MG-35 MCG per tablet Take 1 tablet by mouth daily, Starting Thu 1/26/2023, Normal           No discharge procedures on file.        ED Provider  Electronically Signed by     IRAIS Romano  08/16/23 0015

## 2023-09-03 DIAGNOSIS — F41.1 GAD (GENERALIZED ANXIETY DISORDER): ICD-10-CM

## 2023-09-05 RX ORDER — BUSPIRONE HYDROCHLORIDE 5 MG/1
TABLET ORAL
Qty: 120 TABLET | Refills: 1 | Status: SHIPPED | OUTPATIENT
Start: 2023-09-05

## 2023-10-11 ENCOUNTER — OFFICE VISIT (OUTPATIENT)
Dept: GYNECOLOGY | Facility: CLINIC | Age: 28
End: 2023-10-11
Payer: COMMERCIAL

## 2023-10-11 DIAGNOSIS — N30.00 ACUTE CYSTITIS WITHOUT HEMATURIA: ICD-10-CM

## 2023-10-11 DIAGNOSIS — N91.5 OLIGOMENORRHEA, UNSPECIFIED TYPE: Primary | ICD-10-CM

## 2023-10-11 DIAGNOSIS — R10.2 PELVIC PAIN: ICD-10-CM

## 2023-10-11 DIAGNOSIS — M54.50 LOW BACK PAIN WITHOUT SCIATICA, UNSPECIFIED BACK PAIN LATERALITY, UNSPECIFIED CHRONICITY: ICD-10-CM

## 2023-10-11 PROCEDURE — 99213 OFFICE O/P EST LOW 20 MIN: CPT | Performed by: PHYSICIAN ASSISTANT

## 2023-10-11 RX ORDER — CEPHALEXIN 500 MG/1
500 CAPSULE ORAL EVERY 12 HOURS SCHEDULED
Qty: 14 CAPSULE | Refills: 0 | Status: SHIPPED | OUTPATIENT
Start: 2023-10-11 | End: 2023-10-18

## 2023-10-11 NOTE — PROGRESS NOTES
Assessment/Plan:    No problem-specific Assessment & Plan notes found for this encounter. Diagnoses and all orders for this visit:    Oligomenorrhea, unspecified type  -     Follicle stimulating hormone; Future  -     Luteinizing hormone; Future  -     TSH, 3rd generation with Free T4 reflex; Future  -     Prolactin; Future  -     Testosterone, free, total; Future    Pelvic pain  -     cephalexin (KEFLEX) 500 mg capsule; Take 1 capsule (500 mg total) by mouth every 12 (twelve) hours for 7 days    Low back pain without sciatica, unspecified back pain laterality, unspecified chronicity    Acute cystitis without hematuria  -     cephalexin (KEFLEX) 500 mg capsule; Take 1 capsule (500 mg total) by mouth every 12 (twelve) hours for 7 days          Subjective:      Patient ID: Nino Bruno is a 33 y.o. female. Pt presents as a new patient problem here today  She was seen in the past at [facility]  She presents today bc she has been having irregular cycles since stopping OCP in early May 2023  Her 1st 2-3 cycles were regular  Her last 2--3 have been later and later  Her LMP currently is 8-31-23  She did have some pelvic pain last month and was tx for a UTI  She now complains of occ LBP  She does have h/o ovarian cysts  She is taking a PNV    UPT is neg today  UA +  Rx kefelx  Hydrate  Rx for BW given  Rto JAn 24 for annual        The following portions of the patient's history were reviewed and updated as appropriate: allergies, current medications, past family history, past medical history, past social history, past surgical history, and problem list.    Review of Systems   Constitutional:  Negative for chills, fever and unexpected weight change. HENT:  Negative for ear pain and sore throat. Eyes:  Negative for pain and visual disturbance. Respiratory:  Negative for cough and shortness of breath. Cardiovascular:  Negative for chest pain and palpitations.    Gastrointestinal:  Negative for abdominal pain, blood in stool, constipation, diarrhea and vomiting. Genitourinary:  Positive for menstrual problem. Negative for dysuria and hematuria. Musculoskeletal:  Negative for arthralgias and back pain. Skin:  Negative for color change and rash. Neurological:  Negative for seizures and syncope. All other systems reviewed and are negative. Objective: There were no vitals taken for this visit. Physical Exam  Vitals and nursing note reviewed.

## 2023-10-12 ENCOUNTER — APPOINTMENT (OUTPATIENT)
Dept: LAB | Facility: CLINIC | Age: 28
End: 2023-10-12
Payer: COMMERCIAL

## 2023-10-12 DIAGNOSIS — N91.5 OLIGOMENORRHEA, UNSPECIFIED TYPE: ICD-10-CM

## 2023-10-12 LAB
FSH SERPL-ACNC: 2.2 MIU/ML
LH SERPL-ACNC: 11.9 MIU/ML
PROLACTIN SERPL-MCNC: 22.91 NG/ML (ref 3.34–26.72)
TSH SERPL DL<=0.05 MIU/L-ACNC: 2.92 UIU/ML (ref 0.45–4.5)

## 2023-10-12 PROCEDURE — 36415 COLL VENOUS BLD VENIPUNCTURE: CPT

## 2023-10-12 PROCEDURE — 83001 ASSAY OF GONADOTROPIN (FSH): CPT

## 2023-10-12 PROCEDURE — 84146 ASSAY OF PROLACTIN: CPT

## 2023-10-12 PROCEDURE — 84402 ASSAY OF FREE TESTOSTERONE: CPT

## 2023-10-12 PROCEDURE — 84443 ASSAY THYROID STIM HORMONE: CPT

## 2023-10-12 PROCEDURE — 84403 ASSAY OF TOTAL TESTOSTERONE: CPT

## 2023-10-12 PROCEDURE — 83002 ASSAY OF GONADOTROPIN (LH): CPT

## 2023-10-14 LAB
TESTOST FREE SERPL-MCNC: 1 PG/ML (ref 0–4.2)
TESTOST SERPL-MCNC: 35 NG/DL (ref 13–71)

## 2023-10-18 DIAGNOSIS — N91.2 AMENORRHEA: Primary | ICD-10-CM

## 2023-10-18 DIAGNOSIS — Z32.00 POSSIBLE PREGNANCY, NOT CONFIRMED: Primary | ICD-10-CM

## 2023-10-19 ENCOUNTER — APPOINTMENT (OUTPATIENT)
Dept: LAB | Facility: CLINIC | Age: 28
End: 2023-10-19
Payer: COMMERCIAL

## 2023-10-19 ENCOUNTER — TELEPHONE (OUTPATIENT)
Dept: GYNECOLOGY | Facility: CLINIC | Age: 28
End: 2023-10-19

## 2023-10-19 DIAGNOSIS — Z32.00 POSSIBLE PREGNANCY, NOT CONFIRMED: ICD-10-CM

## 2023-10-19 LAB — B-HCG SERPL-ACNC: 37 MIU/ML (ref 0–5)

## 2023-10-19 PROCEDURE — 84702 CHORIONIC GONADOTROPIN TEST: CPT

## 2023-10-19 PROCEDURE — 36415 COLL VENOUS BLD VENIPUNCTURE: CPT

## 2023-10-19 NOTE — TELEPHONE ENCOUNTER
Pt is very newly pregnant. Going for another [employer] per [employer]. Pt has a question regarding her anxiety medication Buspar as if she should stop taking it or is it safe?

## 2023-10-20 ENCOUNTER — APPOINTMENT (OUTPATIENT)
Dept: LAB | Facility: CLINIC | Age: 28
End: 2023-10-20
Payer: COMMERCIAL

## 2023-10-20 DIAGNOSIS — Z32.00 ENCOUNTER FOR PREGNANCY TEST, RESULT UNKNOWN: ICD-10-CM

## 2023-10-20 LAB — B-HCG SERPL-ACNC: 75 MIU/ML (ref 0–5)

## 2023-10-20 PROCEDURE — 84702 CHORIONIC GONADOTROPIN TEST: CPT

## 2023-10-20 PROCEDURE — 36415 COLL VENOUS BLD VENIPUNCTURE: CPT

## 2023-10-23 ENCOUNTER — TELEPHONE (OUTPATIENT)
Dept: GYNECOLOGY | Facility: CLINIC | Age: 28
End: 2023-10-23

## 2023-10-23 NOTE — TELEPHONE ENCOUNTER
The patient had two positive HCG quant tests last week. She is a patient of [facility]. Her last LMP was 8/31/23 but according to the quant the pregnancy is early. Please call the patient and schedule her 1st OB appointment.

## 2023-10-24 ENCOUNTER — APPOINTMENT (OUTPATIENT)
Dept: LAB | Facility: CLINIC | Age: 28
End: 2023-10-24
Payer: COMMERCIAL

## 2023-10-24 DIAGNOSIS — K21.9 GASTROESOPHAGEAL REFLUX DISEASE, UNSPECIFIED WHETHER ESOPHAGITIS PRESENT: ICD-10-CM

## 2023-10-24 DIAGNOSIS — F41.1 GAD (GENERALIZED ANXIETY DISORDER): ICD-10-CM

## 2023-10-24 DIAGNOSIS — Z13.89 SCREENING FOR CARDIOVASCULAR, RESPIRATORY, AND GENITOURINARY DISEASES: ICD-10-CM

## 2023-10-24 DIAGNOSIS — Z13.83 SCREENING FOR CARDIOVASCULAR, RESPIRATORY, AND GENITOURINARY DISEASES: ICD-10-CM

## 2023-10-24 DIAGNOSIS — Z3A.01 LESS THAN 8 WEEKS GESTATION OF PREGNANCY: Primary | ICD-10-CM

## 2023-10-24 DIAGNOSIS — Z13.6 SCREENING FOR CARDIOVASCULAR, RESPIRATORY, AND GENITOURINARY DISEASES: ICD-10-CM

## 2023-10-24 DIAGNOSIS — Z3A.01 LESS THAN 8 WEEKS GESTATION OF PREGNANCY: ICD-10-CM

## 2023-10-24 LAB
ABO GROUP BLD: NORMAL
B-HCG SERPL-ACNC: 710 MIU/ML (ref 0–5)
BLD GP AB SCN SERPL QL: NEGATIVE
RH BLD: POSITIVE
SPECIMEN EXPIRATION DATE: NORMAL

## 2023-10-24 PROCEDURE — 86901 BLOOD TYPING SEROLOGIC RH(D): CPT

## 2023-10-24 PROCEDURE — 36415 COLL VENOUS BLD VENIPUNCTURE: CPT

## 2023-10-24 PROCEDURE — 84702 CHORIONIC GONADOTROPIN TEST: CPT

## 2023-10-24 PROCEDURE — 86850 RBC ANTIBODY SCREEN: CPT

## 2023-10-24 PROCEDURE — 86900 BLOOD TYPING SEROLOGIC ABO: CPT

## 2023-10-24 NOTE — TELEPHONE ENCOUNTER
Patient scheduled for December 4th for a nob. Patient said that since getting off birth control her periods have not been regular. Patient is going to complete more labs.

## 2023-10-24 NOTE — TELEPHONE ENCOUNTER
Orders placed for another HCG to complete and also a type and screen ( per lucy provider) Called patient wanted to make her aware more blood work was ordered and also to schedule her for an appointment.

## 2023-10-25 ENCOUNTER — TELEPHONE (OUTPATIENT)
Dept: OBGYN CLINIC | Facility: CLINIC | Age: 28
End: 2023-10-25

## 2023-10-25 NOTE — TELEPHONE ENCOUNTER
Patient called, left message on answering machine, requesting a return call to review lab results completed yesterday.

## 2023-10-25 NOTE — TELEPHONE ENCOUNTER
Placed call to patient, patient made aware that we are awaiting provider review and would reach out with results after they are reviewed.

## 2023-10-26 ENCOUNTER — APPOINTMENT (OUTPATIENT)
Dept: LAB | Facility: CLINIC | Age: 28
End: 2023-10-26
Payer: COMMERCIAL

## 2023-10-26 ENCOUNTER — PATIENT MESSAGE (OUTPATIENT)
Dept: OBGYN CLINIC | Facility: CLINIC | Age: 28
End: 2023-10-26

## 2023-10-26 DIAGNOSIS — Z3A.01 LESS THAN 8 WEEKS GESTATION OF PREGNANCY: Primary | ICD-10-CM

## 2023-10-26 DIAGNOSIS — Z3A.01 LESS THAN 8 WEEKS GESTATION OF PREGNANCY: ICD-10-CM

## 2023-10-26 LAB — B-HCG SERPL-ACNC: 1845 MIU/ML (ref 0–5)

## 2023-10-26 PROCEDURE — 84702 CHORIONIC GONADOTROPIN TEST: CPT

## 2023-10-26 PROCEDURE — 36415 COLL VENOUS BLD VENIPUNCTURE: CPT

## 2023-10-27 ENCOUNTER — APPOINTMENT (EMERGENCY)
Dept: ULTRASOUND IMAGING | Facility: HOSPITAL | Age: 28
End: 2023-10-27
Payer: COMMERCIAL

## 2023-10-27 ENCOUNTER — HOSPITAL ENCOUNTER (EMERGENCY)
Facility: HOSPITAL | Age: 28
Discharge: HOME | End: 2023-10-27
Attending: EMERGENCY MEDICINE
Payer: COMMERCIAL

## 2023-10-27 ENCOUNTER — TELEPHONE (OUTPATIENT)
Dept: OBGYN CLINIC | Facility: CLINIC | Age: 28
End: 2023-10-27

## 2023-10-27 VITALS
RESPIRATION RATE: 20 BRPM | SYSTOLIC BLOOD PRESSURE: 131 MMHG | TEMPERATURE: 97.7 F | OXYGEN SATURATION: 100 % | HEART RATE: 102 BPM | WEIGHT: 219.58 LBS | BODY MASS INDEX: 36.54 KG/M2 | DIASTOLIC BLOOD PRESSURE: 63 MMHG

## 2023-10-27 DIAGNOSIS — Z34.90 PREGNANT: ICD-10-CM

## 2023-10-27 DIAGNOSIS — Z3A.01 LESS THAN 8 WEEKS GESTATION OF PREGNANCY: ICD-10-CM

## 2023-10-27 DIAGNOSIS — M54.50 LOW BACK PAIN, UNSPECIFIED BACK PAIN LATERALITY, UNSPECIFIED CHRONICITY, UNSPECIFIED WHETHER SCIATICA PRESENT: Primary | ICD-10-CM

## 2023-10-27 DIAGNOSIS — R10.9 RIGHT FLANK PAIN: Primary | ICD-10-CM

## 2023-10-27 LAB
B-HCG SERPL-ACNC: 2928 MIU/ML (ref 0–5)
BACTERIA UR QL AUTO: NORMAL /HPF
BILIRUB UR QL STRIP: NEGATIVE
CLARITY UR: CLEAR
COLOR UR: COLORLESS
GLUCOSE UR STRIP-MCNC: NEGATIVE MG/DL
HGB UR QL STRIP.AUTO: NEGATIVE
KETONES UR STRIP-MCNC: NEGATIVE MG/DL
LEUKOCYTE ESTERASE UR QL STRIP: ABNORMAL
NITRITE UR QL STRIP: NEGATIVE
NON-SQ EPI CELLS URNS QL MICRO: NORMAL /HPF
PH UR STRIP.AUTO: 5.5 [PH]
PROT UR STRIP-MCNC: NEGATIVE MG/DL
RBC #/AREA URNS AUTO: NORMAL /HPF
SP GR UR STRIP.AUTO: 1 (ref 1–1.03)
UROBILINOGEN UR STRIP-ACNC: <2 MG/DL
WBC #/AREA URNS AUTO: NORMAL /HPF

## 2023-10-27 PROCEDURE — 99284 EMERGENCY DEPT VISIT MOD MDM: CPT | Performed by: EMERGENCY MEDICINE

## 2023-10-27 PROCEDURE — 76815 OB US LIMITED FETUS(S): CPT

## 2023-10-27 PROCEDURE — 36415 COLL VENOUS BLD VENIPUNCTURE: CPT | Performed by: EMERGENCY MEDICINE

## 2023-10-27 PROCEDURE — 84702 CHORIONIC GONADOTROPIN TEST: CPT | Performed by: EMERGENCY MEDICINE

## 2023-10-27 PROCEDURE — 87086 URINE CULTURE/COLONY COUNT: CPT | Performed by: EMERGENCY MEDICINE

## 2023-10-27 PROCEDURE — 99284 EMERGENCY DEPT VISIT MOD MDM: CPT

## 2023-10-27 PROCEDURE — 81001 URINALYSIS AUTO W/SCOPE: CPT | Performed by: EMERGENCY MEDICINE

## 2023-10-27 RX ORDER — LIDOCAINE 50 MG/G
1 PATCH TOPICAL ONCE
Status: DISCONTINUED | OUTPATIENT
Start: 2023-10-27 | End: 2023-10-28 | Stop reason: HOSPADM

## 2023-10-27 RX ORDER — ACETAMINOPHEN 325 MG/1
975 TABLET ORAL ONCE
Status: COMPLETED | OUTPATIENT
Start: 2023-10-27 | End: 2023-10-27

## 2023-10-27 RX ADMIN — LIDOCAINE 5% 1 PATCH: 700 PATCH TOPICAL at 21:30

## 2023-10-27 RX ADMIN — ACETAMINOPHEN 975 MG: 325 TABLET, FILM COATED ORAL at 21:29

## 2023-10-27 NOTE — TELEPHONE ENCOUNTER
Patient returned my call, reviewed HCG level was not reviewed by provider at this time. C/O middle back pain on the right side. Pretty intense. Throbbing. Not sharp, shooting, or stabbing. Radiates from middle on her back down to her right hip and leg. OTC Tylenol with some relief. Did not use heating pad. Denies dysuria. Had 2 UTIs within the last 2-3 months. Saw Miya Carter on 10/11, UA+ in office - no send out. Prescription for Keflex on 10/11/23 to treat UTI. Denies hematuria, urgency, frequency. Patient states previous UTIs she presented with back pain and not normal UTI sx. LMP was 8/31 - 8.1 weeks pregnant but menses inconsistent. HCG on file from 10/26/23 gives 4-5 week range (value 1,845). Spoke to Dr. Evelyn Partida on call - Would offer to patient to go to [facility] to be evaluated. ADT21 placed. Patient made aware.

## 2023-10-27 NOTE — TELEPHONE ENCOUNTER
Patient called, left message on answering machine< requesting test results and is also have bad back pain since last night.

## 2023-10-28 LAB — BACTERIA UR CULT: NORMAL

## 2023-10-28 NOTE — ED ATTENDING ATTESTATION
10/27/2023  IJaime MD, saw and evaluated the patient. I have discussed the patient with the resident/non-physician practitioner and agree with the resident's/non-physician practitioner's findings, Plan of Care, and MDM as documented in the resident's/non-physician practitioner's note, except where noted. All available labs and Radiology studies were reviewed. I was present for key portions of any procedure(s) performed by the resident/non-physician practitioner and I was immediately available to provide assistance. At this point I agree with the current assessment done in the Emergency Department. I have conducted an independent evaluation of this patient a history and physical is as follows: Right flank pain radiating down the right leg. Pregnant, unsure of gestational age. Has follow-up with OB/GYN as an outpatient. No previous ultrasound to verify intrauterine pregnancy. Patient without hematuria. Reviewed CT scan from several months ago which revealed no stones within the kidney. No clinical evidence of kidney stone. Suspect musculoskeletal back pain however due to flank pain, known pregnancy without intrauterine pregnancy identified, ultrasound was obtained which showed small sac like structure within the endometrium as well as additional saclike structure potentially within the myometrium. Appreciate real-time OB/GYN consultation, patient stable for outpatient follow-up per OB/GYN with repeat blood work and ultrasound. Patient has an OB/GYN with whom she will follow-up. Return precaution discussed, patient stable at time of discharge.     Results Reviewed       Procedure Component Value Units Date/Time    hCG, quantitative [[id number]]  (Abnormal) Collected: 10/27/23 2101    Lab Status: Final result Specimen: Blood from Arm, Right Updated: 10/27/23 2217     HCG, Quant 2,928 mIU/mL     Narrative:       Expected Ranges:    HCG results between 5 and 25 mIU/mL may be indicative of early pregnancy but should be interpreted in light of the total clinical presentation. HCG can rise to detectable levels in teressa and post menopausal women (0-11.6 mIU/mL). Approximate               Approximate HCG  Gestation age          Concentration ( mIU/mL)  _____________          ______________________   Ronal Tena                      HCG values  0.2-1                       5-50  1-2                           2-3                         100-5000  3-4                         500-41547  4-5                         1000-93258  5-6                         61905-308715  6-8                         49839-141343  8-12                        59325-742311      Urine Microscopic [[id number]] Collected: 10/27/23 1916    Lab Status: Final result Specimen: Urine, Clean Catch Updated: 10/27/23 1934     RBC, UA 1-2 /hpf      WBC, UA 1-2 /hpf      Epithelial Cells Occasional /hpf      Bacteria, UA Occasional /hpf      URINE COMMENT --    UA w Reflex to Microscopic w Reflex to Culture [[id number]]  (Abnormal) Collected: 10/27/23 1916    Lab Status: Final result Specimen: Urine, Clean Catch Updated: 10/27/23 1929     Color, UA Colorless     Clarity, UA Clear     Specific Gravity, UA 1.003     pH, UA 5.5     Leukocytes, UA Trace     Nitrite, UA Negative     Protein, UA Negative mg/dl      Glucose, UA Negative mg/dl      Ketones, UA Negative mg/dl      Urobilinogen, UA <2.0 mg/dl      Bilirubin, UA Negative     Occult Blood, UA Negative     URINE COMMENT --    Urine culture [[id number]] Collected: 10/27/23 1916    Lab Status: In process Specimen: Urine, Clean Catch Updated: 10/27/23 1929           OB pregnancy limited with transvaginal   Final Result by Olvin Florian MD (10/27 2225)      Small saclike structure with hyperechoic rim and surrounding increased vascularity in the myometrium measuring 4 x 5 x 4 mm. No yolk sac or embryo identified.  Although indeterminate, interstitial ectopic pregnancy cannot be excluded. Gynecology    consultation is recommended. Correlate with serial serum beta hCG levels with repeat pelvic ultrasound as clinically appropriate. Tiny saclike structure in the endometrium corresponding to a mean gestational sac size of 3 mm. No yolk sac or embryo identified. This finding can be reevaluated at the time of follow-up.       I personally discussed this study with Donell Hamman on 10/27/2023 10:05 PM.                              Workstation performed: [id number]               ED Course         Critical Care Time  Procedures

## 2023-10-28 NOTE — ED PROVIDER NOTES
History  Chief Complaint   Patient presents with    Flank Pain     Patient presents to ED for R sided flank pain. Denies urinary symptoms. Told to come to ED by provider to rule out kidney stone. Patient reports that she is pregnant     33year old 2 week pregnant Female  with PMH of GERD presents to the ED complaining of right back/flank pain that started last night. She woke up with pain and describes it as a dull achy pain in her right flank. The pain radiates down her right buttock and into her right leg with numbness and tingling in her right toes. She denies any recent heavy lifting or inciting events that may have caused her pain. Also denies any vaginal bleeding. Denies chest pain, SOB, cough, abdominal pain, n/v/d, fever, chills, dizziness, lightheadedness, HA, dysuria, hematuria, hematochezia, or melena. Prior to Admission Medications   Prescriptions Last Dose Informant Patient Reported? Taking? Tri-Estarylla 0.18/0.215/0.25 MG-35 MCG per tablet   No No   Sig: Take 1 tablet by mouth daily   busPIRone (BUSPAR) 5 mg tablet   No No   Si TABLET TWICE DAILY X4 DAYS, THEN 1 & 1/2 TAB TWICE DAILY X 4 DAYS, THEN 2 TABS TWICE DAILY   pantoprazole (PROTONIX) 20 mg tablet   No No   Sig: TAKE 1 TABLET BY MOUTH DAILY BEFORE BREAKFAST.       Facility-Administered Medications: None       Past Medical History:   Diagnosis Date    GERD (gastroesophageal reflux disease)     Headache(784.0)     Stress headaches    Ovarian cyst     last assessed 2014       Past Surgical History:   Procedure Laterality Date    WISDOM TOOTH EXTRACTION  2018       Family History   Problem Relation Age of Onset    Gallbladder disease Mother     Migraines Mother     Hypertension Father     Asthma Father     Endometriosis Sister         Partial Hyst    Ulcerative colitis Maternal Grandmother     Other Maternal Grandfather         cardiac disorder     I have reviewed and agree with the history as documented. E-Cigarette/Vaping    E-Cigarette Use Never User      E-Cigarette/Vaping Substances    Nicotine No     THC No     CBD No     Flavoring No     Other No     Unknown No      Social History     Tobacco Use    Smoking status: Never    Smokeless tobacco: Never   Vaping Use    Vaping Use: Never used   Substance Use Topics    Alcohol use: Not Currently     Alcohol/week: 2.0 standard drinks of alcohol     Types: 2 Glasses of wine per week     Comment: I drink socially    Drug use: Never        Review of Systems   Constitutional:  Negative for appetite change, chills, diaphoresis, fatigue and fever. HENT:  Negative for congestion, ear pain, postnasal drip, rhinorrhea, sore throat and trouble swallowing. Eyes:  Negative for pain and visual disturbance. Respiratory:  Negative for cough and shortness of breath. Cardiovascular:  Negative for chest pain and palpitations. Gastrointestinal:  Negative for abdominal pain, constipation, diarrhea, nausea and vomiting. Genitourinary:  Positive for flank pain. Negative for decreased urine volume, dysuria and hematuria. Musculoskeletal:  Positive for back pain. Negative for arthralgias. Right flank/back pain   Skin:  Negative for color change and rash. Neurological:  Positive for numbness. Negative for dizziness, seizures, syncope, weakness, light-headedness and headaches. Numbness and tingling of right leg   All other systems reviewed and are negative.       Physical Exam  ED Triage Vitals   Temperature Pulse Respirations Blood Pressure SpO2   10/27/23 1818 10/27/23 1819 10/27/23 1819 10/27/23 1819 10/27/23 1819   97.7 °F (36.5 °C) 102 20 138/85 100 %      Temp Source Heart Rate Source Patient Position - Orthostatic VS BP Location FiO2 (%)   10/27/23 1818 10/27/23 1819 10/27/23 1819 10/27/23 1819 --   Oral Monitor Sitting Right arm       Pain Score       --                    Orthostatic Vital Signs  Vitals:    10/27/23 1819 10/27/23 2101 BP: 138/85 131/63   Pulse: 102 102   Patient Position - Orthostatic VS: Sitting Sitting       Physical Exam  Vitals and nursing note reviewed. Constitutional:       Appearance: Normal appearance. She is normal weight. HENT:      Head: Normocephalic and atraumatic. Right Ear: External ear normal.      Left Ear: External ear normal.      Nose: Nose normal.      Mouth/Throat:      Pharynx: Oropharynx is clear. Eyes:      Conjunctiva/sclera: Conjunctivae normal.   Cardiovascular:      Rate and Rhythm: Normal rate and regular rhythm. Pulses: Normal pulses. Heart sounds: Normal heart sounds. Comments: RRR with +S1 and S2, no murmurs appreciated on exam. Peripheral pulses intact. Pulmonary:      Effort: Pulmonary effort is normal.      Breath sounds: Normal breath sounds. Comments: CTABL with no abnormal lung sounds such as wheezes or rales appreciated on exam.     Abdominal:      General: Abdomen is flat. Bowel sounds are normal. There is no distension. Palpations: Abdomen is soft. Tenderness: There is no abdominal tenderness. Comments: Soft, non tender, normo-active bowel sounds. Without rigidity, guarding, or distension. Musculoskeletal:         General: Normal range of motion. Cervical back: Normal range of motion. Comments: No tenderness to palpation in upper, lower extremities, or spinal tenderness. Mild pain on ROM however, Full ROM with active and passive movement of the joints. 5/5 strength in BL upper and lower extremities. No signs of abrasions, ecchymosis, erythema, or gross deformity was noted. Straight leg raise negative in BL lower extremities. Skin:     General: Skin is warm and dry. Neurological:      General: No focal deficit present. Mental Status: She is alert and oriented to person, place, and time. Mental status is at baseline. Comments: CN II-XII intact.  No focal neurologic deficits noted on exam.  5/5 strength in all extremities. Neurovascularly intact with normal sensation and motor function. ED Medications  Medications   acetaminophen (TYLENOL) tablet 975 mg (975 mg Oral Given 10/27/23 2129)       Diagnostic Studies  Results Reviewed       Procedure Component Value Units Date/Time    Urine culture [[id number]] Collected: 10/27/23 1916    Lab Status: Final result Specimen: Urine, Clean Catch Updated: 10/28/23 1543     Urine Culture No Growth <1000 cfu/mL    hCG, quantitative [[id number]]  (Abnormal) Collected: 10/27/23 2101    Lab Status: Final result Specimen: Blood from Arm, Right Updated: 10/27/23 2217     HCG, Quant 2,928 mIU/mL     Narrative:       Expected Ranges:    HCG results between 5 and 25 mIU/mL may be indicative of early pregnancy but should be interpreted in light of the total clinical presentation. HCG can rise to detectable levels in teressa and post menopausal women (0-11.6 mIU/mL).      Approximate               Approximate HCG  Gestation age          Concentration ( mIU/mL)  _____________          ______________________   [facility]                      HCG values  0.2-1                       5-50  1-2                           2-3                         100-5000  3-4                         500-51539  4-5                         1000-37598  5-6                         13837-554434  6-8                         20528-892947  8-12                        15048-285661      Urine Microscopic [[id number]] Collected: 10/27/23 1916    Lab Status: Final result Specimen: Urine, Clean Catch Updated: 10/27/23 1934     RBC, UA 1-2 /hpf      WBC, UA 1-2 /hpf      Epithelial Cells Occasional /hpf      Bacteria, UA Occasional /hpf      URINE COMMENT --    UA w Reflex to Microscopic w Reflex to Culture [[id number]]  (Abnormal) Collected: 10/27/23 1916    Lab Status: Final result Specimen: Urine, Clean Catch Updated: 10/27/23 1929     Color, UA Colorless     Clarity, UA Clear     Specific Gravity, UA 1.003     pH, UA 5.5     Leukocytes, UA Trace     Nitrite, UA Negative     Protein, UA Negative mg/dl      Glucose, UA Negative mg/dl      Ketones, UA Negative mg/dl      Urobilinogen, UA <2.0 mg/dl      Bilirubin, UA Negative     Occult Blood, UA Negative     URINE COMMENT --                   US OB pregnancy limited with transvaginal   Final Result by Ophelia Lang MD (10/27 2225)      Small saclike structure with hyperechoic rim and surrounding increased vascularity in the myometrium measuring 4 x 5 x 4 mm. No yolk sac or embryo identified. Although indeterminate, interstitial ectopic pregnancy cannot be excluded. Gynecology    consultation is recommended. Correlate with serial serum beta hCG levels with repeat pelvic ultrasound as clinically appropriate. Tiny saclike structure in the endometrium corresponding to a mean gestational sac size of 3 mm. No yolk sac or embryo identified. This finding can be reevaluated at the time of follow-up. I personally discussed this study with Giovany Laurent on 10/27/2023 10:05 PM.                              Workstation performed: [id number]               Procedures  Procedures      ED Course                             SBIRT 20yo+      Flowsheet Row Most Recent Value   Initial Alcohol Screen: US AUDIT-C     1. How often do you have a drink containing alcohol? 0 Filed at: 10/27/2023 1821   2. How many drinks containing alcohol do you have on a typical day you are drinking? 0 Filed at: 10/27/2023 1821   3a. Male UNDER 65: How often do you have five or more drinks on one occasion? 0 Filed at: 10/27/2023 1821   3b. FEMALE Any Age, or MALE 65+: How often do you have 4 or more drinks on one occassion? 0 Filed at: 10/27/2023 1821   Audit-C Score 0 Filed at: 10/27/2023 1821   EDY: How many times in the past year have you. .. Used an illegal drug or used a prescription medication for non-medical reasons?  Never Filed at: 10/27/2023 Oneyda Making  30-year-old 2-week pregnant female  with PMH of GERD presented to the ED complaining of right back/flank pain that started last night. Patient's labs and imaging were ordered and reviewed by the ED provider. Patient's labs were noted to have an hCG of 2,928 otherwise her other urine labs showed no acute concerns at this time. Patient's OB transvaginal ultrasound showed small saclike structure with hyperechoic rim and surrounding increased vascularity in the myometrium measuring 4 x 5 x 4 mm. No yolk sac or embryo identified however, interstitial ectopic pregnancy cannot be excluded. Also a tiny saclike structure in the endometrium corresponding to a mean gestational sac size of 3 mm also showing no yolk sac or embryo. Patient was given 975 mg of Tylenol along with a lidocaine patch to apply to her right lower back. Patient was reassured of her symptoms by the ED provider that she is likely experiencing sciatica like symptoms. Patient was advised to follow-up outpatient with her PCP along with her OB/GYN. Patient was also instructed to return to the ED if her symptoms worsen including but not limited to increasing pain, numbness or tingling in her foot, inability to bear weight on her right leg, inability to ambulate, abdominal pain, vaginal bleeding, or changes in her behavior. Amount and/or Complexity of Data Reviewed  Labs: ordered. Radiology: ordered. Risk  OTC drugs.           Disposition  Final diagnoses:   Right flank pain   Pregnant     Time reflects when diagnosis was documented in both MDM as applicable and the Disposition within this note       Time User Action Codes Description Comment    10/27/2023  9:27 PM Tracee Myers Add [R10.9] Right flank pain     10/27/2023  9:28 PM Tracee Pinois Add [M54.30] Sciatica     10/27/2023 10:33 PM Lyntikin Louis Remove [M54.30] Sciatica     10/27/2023 10:33 PM Tracee Pinois Add [C47.04] Pregnant           ED Disposition       ED Disposition   Discharge Condition   Stable    Date/Time   Fri Oct 27, 2023 8368    [address] discharge to home/self care. Follow-up Information       Follow up With Specialties Details Why Contact Info Additional Information    Tita Swift MD Family Medicine Call in 3 days  [address] [place] [facility] Emergency Department Emergency Medicine Go to  As needed [address] [facility], [place], [place], [address]            Discharge Medication List as of 10/27/2023 10:34 PM        CONTINUE these medications which have NOT CHANGED    Details   busPIRone (BUSPAR) 5 mg tablet 1 TABLET TWICE DAILY X4 DAYS, THEN 1 & 1/2 TAB TWICE DAILY X 4 DAYS, THEN 2 TABS TWICE DAILY, Normal      pantoprazole (PROTONIX) 20 mg tablet TAKE 1 TABLET BY MOUTH DAILY BEFORE BREAKFAST., Normal      Tri-Estarylla 0.18/0.215/0.25 MG-35 MCG per tablet Take 1 tablet by mouth daily, Starting Thu 1/26/2023, Normal           No discharge procedures on file. PDMP Review       None             ED Provider  Attending physically available and evaluated Rk Quiet. I managed the patient along with the ED Attending.     Electronically Signed by           Angel Caballero MD  10/29/23 3232

## 2023-10-30 ENCOUNTER — TELEPHONE (OUTPATIENT)
Dept: OBGYN CLINIC | Facility: CLINIC | Age: 28
End: 2023-10-30

## 2023-10-30 DIAGNOSIS — Z3A.01 LESS THAN 8 WEEKS GESTATION OF PREGNANCY: Primary | ICD-10-CM

## 2023-10-30 NOTE — TELEPHONE ENCOUNTER
Placed call to patient, left message on answering machine advising that patient should still complete tests as discussed. Requested a return call if she has any questions.

## 2023-10-30 NOTE — TELEPHONE ENCOUNTER
Patient is scheduled for 4 weeks from now for her nob. Aware to call us if she has any questions or concerns before apt. Will also complete another hcg level.

## 2023-10-30 NOTE — TELEPHONE ENCOUNTER
Pt lizethom, went to the ER on Friday. Found something in the ultrasound. Was told to call us for follow up blood work and ultrasound.

## 2023-10-30 NOTE — TELEPHONE ENCOUNTER
Patient left message on machine - received a message regarding test results. Spoke with staff this morning about ED results and f/u blood work for another hcg. Wondering if that was still going to be happening. Didn't see in mychart.

## 2023-10-30 NOTE — TELEPHONE ENCOUNTER
"[facility]  ED Nurse Handoff Report    ED Chief complaint: Dizziness      ED Diagnosis:   Final diagnoses:   Dizziness   Nausea       Code Status: Full Code, needs to be addressed by admitting MD    Allergies:   Allergies   Allergen Reactions     Penicillins        Activity level - Baseline/Home:  Independent  Activity Level - Current:   Independent    Patient's Preferred language: English   Needed?: No    Isolation: No  Infection: Not Applicable  Bariatric?: No    Vital Signs:   Vitals:    10/23/19 0847 10/23/19 0919 10/23/19 0921   BP: (!) 146/96 121/61    Pulse: 71 72    Resp: 14  14   SpO2: 98%  94%   Weight: 77.6 kg (171 lb)         Cardiac Rhythm: ,        Pain level:      Is this patient confused?: No   Does this patient have a guardian?  No         If yes, is there guardianship documents in the Epic \"Code/ACP\" activity?  N/A         Guardian Notified?  N/A  [place] - Suicide Severity Rating Scale Completed?  Yes  If yes, what color did the patient score?  White    Patient Report: Initial Complaint: Pt presents via EMS for complaints of sudden onset of dizziness at 0730 this morning while getting into shower. Pt reports dizziness is worse with movements but able to ambulate. Pt reports nausea and vomiting associated. Code Stroke called  Focused Assessment: +Dizziness/Nausea/Vomiting  Tests Performed: Labs, CT, EKG  Abnormal Results: See results  Treatments provided: Zofran 2x, Compazine, and Benadryl    Family Comments: Wife and daughter at bedside    OBS brochure/video discussed/provided to patient/family: Yes              Name of person given brochure if not patient: NA              Relationship to patient: NA    ED Medications:   Medications   100mL Saline (has no administration in time range)   ondansetron (ZOFRAN) 2 MG/ML injection (4 mg  Given 10/23/19 0846)   iopamidol (ISOVUE-370) solution 120 mL (120 mLs Intravenous Given 10/23/19 0916)   ondansetron (ZOFRAN) 2 MG/ML " She should have an ultrasound in 2-4 weeks. Gestational sac was seen in the ED but very early (no fetal pole or yolk sac yet) and no evidence of ectopic. Her HCG was been increasing which suggests just very early pregnancy rather than miscarriage though ectopic cannot be excluded until IUP is visualized. injection (4 mg  Given 10/23/19 0921)   prochlorperazine (COMPAZINE) injection 5 mg (5 mg Intravenous Given 10/23/19 0920)   diphenhydrAMINE (BENADRYL) injection 25 mg (25 mg Intravenous Given 10/23/19 0921)       Drips infusing?:  No    For the majority of the shift this patient was Green.   Interventions performed were NA.    Severe Sepsis OR Septic Shock Diagnosis Present: No    To be done/followed up on inpatient unit:  Cont to monitor    ED NURSE PHONE NUMBER: [phone]

## 2023-10-30 NOTE — TELEPHONE ENCOUNTER
Patient left message on machine - noticed today, experiencing itching and irritation down there. Feels like yeast infection. Inquiring if able to use monistat or if recommended something else.

## 2023-10-30 NOTE — TELEPHONE ENCOUNTER
Patient stated that since having a transvaginal ultrasound in the ER over the weekend she had a light pink tinge to her d/c yesterday and today. Stephanie Massey it is very light and only happened once yesterday and once today. Aware to monitor for now and this can be normal after an internal ultrasound. Will call us with any increased spotting.

## 2023-11-06 DIAGNOSIS — F41.1 GAD (GENERALIZED ANXIETY DISORDER): ICD-10-CM

## 2023-11-06 RX ORDER — BUSPIRONE HYDROCHLORIDE 5 MG/1
TABLET ORAL
Qty: 120 TABLET | Refills: 1 | Status: SHIPPED | OUTPATIENT
Start: 2023-11-06

## 2023-11-15 ENCOUNTER — TELEPHONE (OUTPATIENT)
Dept: OBGYN CLINIC | Facility: CLINIC | Age: 28
End: 2023-11-15

## 2023-11-15 NOTE — TELEPHONE ENCOUNTER
Called patient, stated that she feels better today and having only little cramping and a little brown d/c but has been having that since pregnancy. Type and screen on file. Patient scheduled for tomorrow at 11/16 for her nob. Aware to call back if worsening of cramps or any change in bleeding.

## 2023-11-15 NOTE — TELEPHONE ENCOUNTER
Patient called, left message on answering machine, yesterday she had pretty bad cramping with dark drown discharge and spotting. Seems to be better today. Cramping has seem to let up, discharge is not as dark brown.

## 2023-11-16 ENCOUNTER — INITIAL PRENATAL (OUTPATIENT)
Dept: OBGYN CLINIC | Facility: CLINIC | Age: 28
End: 2023-11-16

## 2023-11-16 VITALS
BODY MASS INDEX: 37.69 KG/M2 | WEIGHT: 226.2 LBS | SYSTOLIC BLOOD PRESSURE: 136 MMHG | HEIGHT: 65 IN | DIASTOLIC BLOOD PRESSURE: 80 MMHG

## 2023-11-16 DIAGNOSIS — N91.2 AMENORRHEA: ICD-10-CM

## 2023-11-16 DIAGNOSIS — Z34.91 PRENATAL CARE IN FIRST TRIMESTER: Primary | ICD-10-CM

## 2023-11-16 DIAGNOSIS — Z3A.01 LESS THAN 8 WEEKS GESTATION OF PREGNANCY: ICD-10-CM

## 2023-11-16 DIAGNOSIS — O21.9 NAUSEA AND VOMITING IN PREGNANCY: ICD-10-CM

## 2023-11-16 RX ORDER — DIPHENHYDRAMINE HYDROCHLORIDE 25 MG/1
25 CAPSULE ORAL 3 TIMES DAILY
Qty: 90 TABLET | Refills: 2 | Status: SHIPPED | OUTPATIENT
Start: 2023-11-16 | End: 2023-11-24

## 2023-11-16 NOTE — PROGRESS NOTES
[facility]  Initial OB visit Part 1  Rachel Rocha MD  11/16/23      Assessment  33 y.o. Hoang Samuel with irregular menses presenting for amenorrhea and positive UPT. Intrauterine clark pregnancy confirmed, dating not consistent with LMP. CLAUDIA 7/4/2024. Plan  Diagnoses and all orders for this visit:    Prenatal care in first trimester  -     Prenatal Vit-Fe Fumarate-FA (PRENATAL PO); Take by mouth  -     HIV 1/2 AG/AB w Reflex [facility] for 2 yr old and above; Future  -     Hepatitis C antibody; Future  -     UA (URINE) with reflex to Scope  -     Urine culture; Future  -     Hepatitis B surface antigen; Future  -     CBC and differential; Future  -     Rubella antibody, IgG; Future  -     Type and screen; Future  -     RPR-Syphilis Screening (Total Syphilis IGG/IGM); Future  -     Hepatitis B surface antibody; Future  -     Anemia Panel w/Reflex, OB; Future  -     SMA Carrier Screen; Future  -     Cystic fibrosis gene test; Standing  -     Hgb Fractionation Cascade; Future  -     Cystic fibrosis gene test  -     Pyridoxine HCl (vitamin B-6) 25 MG tablet; Take 1 tablet (25 mg total) by mouth 3 (three) times a day  -     doxylamine (UNISOM) 25 MG tablet; Take 1 tablet (25 mg total) by mouth daily at bedtime as needed for sleep  -     Ambulatory Referral to Maternal Fetal Medicine; Future  -     Glucose, 1H PG; Future    Nausea and vomiting in pregnancy  -     Pyridoxine HCl (vitamin B-6) 25 MG tablet; Take 1 tablet (25 mg total) by mouth 3 (three) times a day  -     doxylamine (UNISOM) 25 MG tablet; Take 1 tablet (25 mg total) by mouth daily at bedtime as needed for sleep   RTO for second part OB     ____________________________________________________________      Subjective   Ted Sims is a 33 y.o. Hoang Samuel with an LMP of Patient's last menstrual period was 08/31/2023 (approximate). (11w by LMP) who presents for amenorrhea. Patient notes that this pregnancy was planned.  She was not using contraception at the time. She reports she is 8/31/2023  she has irregular menses. Has been having spotting and cramping- was seen in ED with gestational sac with no yolk sac or fetal pole. OB Hx: First pregnancy  GYN Hx: h/o ovarian cyst  PMH: anxiety and GERD  PSH: [place] teeth  Social Hx: None   Family/ genetic Hx: HTN, DM   VTE Hx: Grandfather     Objective  /80 (BP Location: Left arm, Cuff Size: Standard)   Ht 5' 5" (1.651 m)   Wt 103 kg (226 lb 3.2 oz)   LMP 08/31/2023 (Approximate)   BMI 37.64 kg/m²       Physical Exam:  Physical Exam  Vitals reviewed. Constitutional:       General: She is not in acute distress. Appearance: She is well-developed. She is not diaphoretic. HENT:      Head: Normocephalic and atraumatic. Cardiovascular:      Rate and Rhythm: Normal rate and regular rhythm. Heart sounds: Normal heart sounds. No murmur heard. No friction rub. No gallop. Pulmonary:      Effort: Pulmonary effort is normal. No respiratory distress. Breath sounds: Normal breath sounds. No wheezing or rales. Abdominal:      Palpations: Abdomen is soft. Tenderness: There is no abdominal tenderness. There is no guarding or rebound. Genitourinary:     Vagina: Normal.   Musculoskeletal:      Cervical back: Normal range of motion and neck supple. Skin:     General: Skin is warm and dry. Neurological:      Mental Status: She is alert and oriented to person, place, and time.    Psychiatric:         Behavior: Behavior normal.         Transvaginal Pelvic Ultrasound  [place] IUP  CRL 9.4 mm, consistent with EGA 7 weeks and 0 days  +yolk sac  +cardiac activity    No adnexal masses appreciated

## 2023-11-16 NOTE — PATIENT INSTRUCTIONS
Pregnancy at 7 to 10 Weeks   AMBULATORY CARE:   Changes happening with your body:  Pregnancy hormones may cause your body to go through many changes during this stage of your pregnancy. You may feel more tired than usual, and have mood swings, nausea and vomiting, and headaches. You may gain or lose some weight. Your breasts may feel tender and swollen and you may urinate more often. You may have cravings for certain foods or dislike of foods you normally eat. You may also have heartburn or constipation. Seek care immediately if:   You have pain or cramping in your abdomen or low back. You have heavy vaginal bleeding or clotting. You pass material that looks like tissue or large clots. Collect the material and bring it with you. Call your doctor or obstetrician if:   You have light bleeding. You have chills or a fever. You have vaginal itching, burning, or pain. You have yellow, green, white, or foul-smelling vaginal discharge. You have pain or burning when you urinate, less urine than usual, or pink or bloody urine. You have questions or concerns about your condition or care. How to care for yourself at this stage of your pregnancy:       Manage nausea and vomiting. Avoid fatty and spicy foods. Eat small meals throughout the day instead of large meals. Ashley may help to decrease nausea. Ask your healthcare provider about other ways of decreasing nausea and vomiting. Eat a variety of healthy foods. Healthy foods include fruits, vegetables, whole-grain breads, low-fat dairy foods, beans, lean meats, and fish. Drink liquids as directed. Ask how much liquid to drink each day and which liquids are best for you. Limit caffeine to less than 200 milligrams each day. Limit your intake of fish to 2 servings each week. Choose fish low in mercury such as canned light tuna, shrimp, salmon, cod, or tilapia. Do not  eat fish high in mercury such as swordfish, tilefish, aaron mackerel, and shark. Take prenatal vitamins as directed. Your need for certain vitamins and minerals, such as folic acid, increases during pregnancy. Prenatal vitamins provide some of the extra vitamins and minerals you need. Prenatal vitamins may also help to decrease the risk of certain birth defects. Ask how much weight you should gain each month. Too much or too little weight gain can be unhealthy for you and your baby. Talk to your healthcare provider about exercise. Moderate exercise can help you stay fit. Your healthcare provider will help you plan an exercise program that is safe for you during pregnancy. Do not smoke. Smoking increases your risk of a miscarriage and other health problems during your pregnancy. Smoking can cause your baby to be born too early or weigh less at birth. Quit smoking as soon as you think you might be pregnant. Ask your healthcare provider for information if you need help quitting. Do not drink alcohol. Alcohol passes from your body to your baby through the placenta. It can affect your baby's brain development and cause fetal alcohol syndrome (FAS). FAS is a group of conditions that causes mental, behavior, and growth problems. Talk to your healthcare provider before you take any medicines. Many medicines may harm your baby if you take them when you are pregnant. Do not take any medicines, vitamins, herbs, or supplements without first talking to your healthcare provider. Never use illegal or street drugs (such as marijuana or cocaine) while you are pregnant. Safety tips during pregnancy:   Avoid hot tubs and saunas. Do not use a hot tub or sauna while you are pregnant, especially during your first trimester. Hot tubs and saunas may raise your baby's temperature and increase the risk of birth defects. Avoid toxoplasmosis. This is an infection caused by eating raw meat or being around infected cat feces.  It can cause birth defects, miscarriages, and other problems. Wash your hands after you touch raw meat. Make sure any meat is well-cooked before you eat it. Avoid raw eggs and unpasteurized milk. Use gloves or ask someone else to clean your cat's litter box while you are pregnant. Changes happening with your baby:  By 10 weeks, your baby will be about 2½ inches long from the top of the head to the rump (baby's bottom). Your baby weighs about ½ ounce. Major body organs, such as the brain, heart, and lungs, are forming. Your baby's facial features are also starting to form. Prenatal care:  Prenatal care is a series of visits with your healthcare provider throughout your pregnancy. During the first 28 weeks of your pregnancy, you will see your healthcare provider 1 time each month. Prenatal care can help prevent problems during pregnancy and childbirth. Your healthcare provider will check your blood pressure and weight. Your baby's heart rate will also be checked. You may also need the following at some visits:  A pelvic exam  allows your healthcare provider to see your cervix (the bottom part of your uterus). Your healthcare provider will use a speculum to open your vagina. He or she will check the size and shape of your uterus. You may also have a Pap smear at your first prenatal visit. This is a test to check your cervix for abnormal cells. Blood tests  may be done to check for any of the following:     Gestational diabetes or anemia (low iron level)    Blood type or Rh factor, or certain birth defects    Immunity to certain diseases, such as chickenpox or rubella    An infection, such as a sexually transmitted infection, HIV, or hepatitis B    Hepatitis B  may need to be prevented or treated. Hepatitis B is inflammation of the liver caused by the hepatitis B virus (HBV). HBV can spread from a mother to her baby during delivery. You will be checked for HBV as early as possible in the first trimester of each pregnancy.  You need the test even if you received the hepatitis B vaccine or were tested before. You may need to have an HBV infection treated before you give birth. Urine tests  may also be done to check for sugar and protein. These can be signs of gestational diabetes or preeclampsia. Urine tests may also be done to check for signs of infection. A fetal ultrasound  shows pictures of your baby inside your uterus. The pictures are used to check your baby's development, movement, and position. Genetic disorder screening tests  may be offered to you. These screening tests check your baby's risk for genetic disorders such as Down syndrome. A screening test includes a blood test and ultrasound. Follow up with your doctor or obstetrician as directed:  Go to all prenatal visits. Write down your questions so you remember to ask them during your visits. © Copyright [employer] 2023 Information is for End User's use only and may not be sold, redistributed or otherwise used for commercial purposes. The above information is an  only. It is not intended as medical advice for individual conditions or treatments. Talk to your doctor, nurse or pharmacist before following any medical regimen to see if it is safe and effective for you.

## 2023-11-16 NOTE — PROGRESS NOTES
Patient present for her NOB education class. Patient was accompanied by her significant other. Reviewed expected appointments and patient stated she understood. Reviewed new OB packet folder and contents of the [facility] Pregnancy Essential guide. Answered all of patients questions and concerns.

## 2023-11-17 ENCOUNTER — TELEPHONE (OUTPATIENT)
Facility: HOSPITAL | Age: 28
End: 2023-11-17

## 2023-11-17 NOTE — TELEPHONE ENCOUNTER
Called patient to schedule MFM appointment, based on referral issued to Maternal Fetal Medicine by [facility] office. Left voicemail requesting patient to call back and schedule appointment, with office number for return call [phone].

## 2023-11-21 ENCOUNTER — APPOINTMENT (OUTPATIENT)
Dept: LAB | Facility: CLINIC | Age: 28
End: 2023-11-21
Payer: COMMERCIAL

## 2023-11-21 DIAGNOSIS — Z3A.01 LESS THAN 8 WEEKS GESTATION OF PREGNANCY: ICD-10-CM

## 2023-11-21 DIAGNOSIS — Z34.91 PRENATAL CARE IN FIRST TRIMESTER: ICD-10-CM

## 2023-11-21 LAB
ABO GROUP BLD: NORMAL
BASOPHILS # BLD AUTO: 0.04 THOUSANDS/ÂΜL (ref 0–0.1)
BASOPHILS NFR BLD AUTO: 0 % (ref 0–1)
BILIRUB UR QL STRIP: NEGATIVE
BLD GP AB SCN SERPL QL: NEGATIVE
CLARITY UR: CLEAR
COLOR UR: COLORLESS
EOSINOPHIL # BLD AUTO: 0.06 THOUSAND/ÂΜL (ref 0–0.61)
EOSINOPHIL NFR BLD AUTO: 1 % (ref 0–6)
ERYTHROCYTE [DISTWIDTH] IN BLOOD BY AUTOMATED COUNT: 14.6 % (ref 11.6–15.1)
GLUCOSE 1H P 50 G GLC PO SERPL-MCNC: 107 MG/DL (ref 40–134)
GLUCOSE UR STRIP-MCNC: NEGATIVE MG/DL
HBV SURFACE AB SER-ACNC: 38.6 MIU/ML
HCT VFR BLD AUTO: 39.3 % (ref 34.8–46.1)
HCV AB SER QL: NORMAL
HGB BLD-MCNC: 12.6 G/DL (ref 11.5–15.4)
HGB UR QL STRIP.AUTO: NEGATIVE
HIV 1+2 AB+HIV1 P24 AG SERPL QL IA: NORMAL
HIV 2 AB SERPL QL IA: NORMAL
HIV1 AB SERPL QL IA: NORMAL
HIV1 P24 AG SERPL QL IA: NORMAL
IMM GRANULOCYTES # BLD AUTO: 0.03 THOUSAND/UL (ref 0–0.2)
IMM GRANULOCYTES NFR BLD AUTO: 0 % (ref 0–2)
KETONES UR STRIP-MCNC: NEGATIVE MG/DL
LEUKOCYTE ESTERASE UR QL STRIP: NEGATIVE
LYMPHOCYTES # BLD AUTO: 2.82 THOUSANDS/ÂΜL (ref 0.6–4.47)
LYMPHOCYTES NFR BLD AUTO: 26 % (ref 14–44)
MCH RBC QN AUTO: 30.1 PG (ref 26.8–34.3)
MCHC RBC AUTO-ENTMCNC: 32.1 G/DL (ref 31.4–37.4)
MCV RBC AUTO: 94 FL (ref 82–98)
MONOCYTES # BLD AUTO: 0.61 THOUSAND/ÂΜL (ref 0.17–1.22)
MONOCYTES NFR BLD AUTO: 6 % (ref 4–12)
NEUTROPHILS # BLD AUTO: 7.11 THOUSANDS/ÂΜL (ref 1.85–7.62)
NEUTS SEG NFR BLD AUTO: 67 % (ref 43–75)
NITRITE UR QL STRIP: NEGATIVE
NRBC BLD AUTO-RTO: 0 /100 WBCS
PH UR STRIP.AUTO: 6.5 [PH]
PLATELET # BLD AUTO: 251 THOUSANDS/UL (ref 149–390)
PMV BLD AUTO: 10.5 FL (ref 8.9–12.7)
PROT UR STRIP-MCNC: NEGATIVE MG/DL
RBC # BLD AUTO: 4.18 MILLION/UL (ref 3.81–5.12)
RH BLD: POSITIVE
RUBV IGG SERPL IA-ACNC: 36.3 IU/ML
SP GR UR STRIP.AUTO: 1.01 (ref 1–1.03)
SPECIMEN EXPIRATION DATE: NORMAL
TREPONEMA PALLIDUM IGG+IGM AB [PRESENCE] IN SERUM OR PLASMA BY IMMUNOASSAY: NORMAL
UROBILINOGEN UR STRIP-ACNC: <2 MG/DL
WBC # BLD AUTO: 10.67 THOUSAND/UL (ref 4.31–10.16)

## 2023-11-21 PROCEDURE — 83020 HEMOGLOBIN ELECTROPHORESIS: CPT

## 2023-11-21 PROCEDURE — 85025 COMPLETE CBC W/AUTO DIFF WBC: CPT

## 2023-11-21 PROCEDURE — 36415 COLL VENOUS BLD VENIPUNCTURE: CPT

## 2023-11-21 PROCEDURE — 87389 HIV-1 AG W/HIV-1&-2 AB AG IA: CPT

## 2023-11-21 PROCEDURE — 86900 BLOOD TYPING SEROLOGIC ABO: CPT

## 2023-11-21 PROCEDURE — 82950 GLUCOSE TEST: CPT

## 2023-11-21 PROCEDURE — 86706 HEP B SURFACE ANTIBODY: CPT

## 2023-11-21 PROCEDURE — 86803 HEPATITIS C AB TEST: CPT

## 2023-11-21 PROCEDURE — 86901 BLOOD TYPING SEROLOGIC RH(D): CPT

## 2023-11-21 PROCEDURE — 87340 HEPATITIS B SURFACE AG IA: CPT

## 2023-11-21 PROCEDURE — 86780 TREPONEMA PALLIDUM: CPT

## 2023-11-21 PROCEDURE — 86850 RBC ANTIBODY SCREEN: CPT

## 2023-11-21 PROCEDURE — 81003 URINALYSIS AUTO W/O SCOPE: CPT | Performed by: STUDENT IN AN ORGANIZED HEALTH CARE EDUCATION/TRAINING PROGRAM

## 2023-11-21 PROCEDURE — 87086 URINE CULTURE/COLONY COUNT: CPT

## 2023-11-21 PROCEDURE — 86762 RUBELLA ANTIBODY: CPT

## 2023-11-22 ENCOUNTER — TELEPHONE (OUTPATIENT)
Dept: OBGYN CLINIC | Facility: CLINIC | Age: 28
End: 2023-11-22

## 2023-11-22 LAB
BACTERIA UR CULT: NORMAL
HBV SURFACE AG SER QL: NORMAL

## 2023-11-23 LAB
HGB A MFR BLD: 2.3 % (ref 1.8–3.2)
HGB A MFR BLD: 97.7 % (ref 96.4–98.8)
HGB F MFR BLD: 0 % (ref 0–2)
HGB FRACT BLD-IMP: NORMAL
HGB S MFR BLD: 0 %

## 2023-11-24 ENCOUNTER — OFFICE VISIT (OUTPATIENT)
Dept: FAMILY MEDICINE CLINIC | Facility: CLINIC | Age: 28
End: 2023-11-24
Payer: COMMERCIAL

## 2023-11-24 VITALS
HEART RATE: 93 BPM | WEIGHT: 226 LBS | BODY MASS INDEX: 37.65 KG/M2 | SYSTOLIC BLOOD PRESSURE: 122 MMHG | DIASTOLIC BLOOD PRESSURE: 88 MMHG | HEIGHT: 65 IN | TEMPERATURE: 98 F | OXYGEN SATURATION: 98 %

## 2023-11-24 DIAGNOSIS — R19.5 ABNORMAL STOOLS: Primary | ICD-10-CM

## 2023-11-24 PROCEDURE — 99213 OFFICE O/P EST LOW 20 MIN: CPT | Performed by: FAMILY MEDICINE

## 2023-11-24 NOTE — PROGRESS NOTES
Assessment/Plan:    No problem-specific Assessment & Plan notes found for this encounter. Diagnoses and all orders for this visit:    Abnormal stools  -     White Blood Cells, Stool by Gram Stain; Future  -     Giardia antigen; Future  -     Ova and parasite examination; Future      Obtain testing as ordered  We discussed that if she is positive we will discuss her case with ID as some antiparasitics are not safe in pregnancy, particularly the first trimester. We discussed. We discussed that her fetus is safe from parasites. Continue PNV. Subjective:      Patient ID: Gloria Lujan is a 33 y.o. female. HPI patient presents with concern of possible GI parasites. She reports she went to [place] in September and went swimming there. Felt in normal state of health until this week, with cramps bloating, but is also recently pregnant (8w EGA. She denies anal itching, no bleeding, no mucus. Notices ssome strings on the TP in the last few days when she wipes. She normally has irregular bowels, so no noticeable changes there for her. No fevers, chills, weight loss. She brings a photo showing strings in her stool. Reports they didn't move. The following portions of the patient's history were reviewed and updated as appropriate: allergies, current medications, past medical history, past social history, and problem list.    Review of Systems   Constitutional:  Negative for activity change, chills and fever. HENT:  Negative for congestion, rhinorrhea and sore throat. Eyes:  Negative for visual disturbance. Respiratory:  Negative for cough, shortness of breath and wheezing. Cardiovascular:  Negative for chest pain and palpitations. Gastrointestinal:  Positive for abdominal distention. Negative for abdominal pain, blood in stool, constipation, diarrhea, nausea and vomiting. "Strings" in stool   Genitourinary:  Negative for dysuria. Musculoskeletal:  Negative for arthralgias and myalgias. Skin:  Negative for rash. Neurological:  Negative for dizziness, weakness and headaches. All other systems reviewed and are negative. Objective:      /88   Pulse 93   Temp 98 °F (36.7 °C)   Ht 5' 5" (1.651 m)   Wt 103 kg (226 lb)   LMP 08/31/2023 (Approximate)   SpO2 98%   BMI 37.61 kg/m²          Physical Exam  Vitals and nursing note reviewed. Constitutional:       General: She is not in acute distress. Appearance: Normal appearance. She is well-developed. She is not ill-appearing or diaphoretic. HENT:      Head: Normocephalic and atraumatic. Right Ear: External ear normal.      Left Ear: External ear normal.      Nose: Nose normal.   Eyes:      General: Lids are normal.      Conjunctiva/sclera: Conjunctivae normal.   Neck:      Vascular: No JVD. Trachea: No tracheal deviation. Pulmonary:      Effort: No accessory muscle usage or respiratory distress. Skin:     Findings: No rash. Neurological:      Mental Status: She is alert.

## 2023-11-26 ENCOUNTER — APPOINTMENT (OUTPATIENT)
Dept: LAB | Facility: CLINIC | Age: 28
End: 2023-11-26
Payer: COMMERCIAL

## 2023-11-26 DIAGNOSIS — R19.5 ABNORMAL STOOLS: ICD-10-CM

## 2023-11-26 PROCEDURE — 87205 SMEAR GRAM STAIN: CPT

## 2023-11-26 PROCEDURE — 87329 GIARDIA AG IA: CPT

## 2023-11-26 PROCEDURE — 87209 SMEAR COMPLEX STAIN: CPT

## 2023-11-26 PROCEDURE — 87177 OVA AND PARASITES SMEARS: CPT

## 2023-11-27 ENCOUNTER — APPOINTMENT (OUTPATIENT)
Dept: LAB | Facility: CLINIC | Age: 28
End: 2023-11-27
Payer: COMMERCIAL

## 2023-11-27 DIAGNOSIS — Z34.91 PRENATAL CARE IN FIRST TRIMESTER: ICD-10-CM

## 2023-11-27 LAB
G LAMBLIA AG STL QL IA: NEGATIVE
WBC STL QL MICRO: NORMAL

## 2023-11-27 PROCEDURE — 81329 SMN1 GENE DOS/DELETION ALYS: CPT

## 2023-11-27 PROCEDURE — 36415 COLL VENOUS BLD VENIPUNCTURE: CPT

## 2023-11-30 ENCOUNTER — TELEPHONE (OUTPATIENT)
Dept: OBGYN CLINIC | Facility: CLINIC | Age: 28
End: 2023-11-30

## 2023-11-30 NOTE — TELEPHONE ENCOUNTER
Called and spoke to patient. Patient reports she has been experiencing cramping, reviewed and understood could be normal. Has been experiencing new cramping, "twinges", not consistently located on one side of abdomen, sometimes experiences vaginally as well. Started this past weekend. Tylenol has provided relief with cramping. Denies any bleeding. Reports has noticed change in discharge, reviewed this can occur throughout pregnancy as well as increased amount, however patient reports discharge is yellow on liner, started noticing more last week as well as vaginal itching. Coconut oil provides relief but does not resolve and itching returns. Has been hydrating well. Appt offered and scheduled for evaluation.

## 2023-12-01 ENCOUNTER — ULTRASOUND (OUTPATIENT)
Dept: OBGYN CLINIC | Facility: CLINIC | Age: 28
End: 2023-12-01

## 2023-12-01 VITALS
WEIGHT: 225.4 LBS | HEIGHT: 65 IN | SYSTOLIC BLOOD PRESSURE: 132 MMHG | BODY MASS INDEX: 37.55 KG/M2 | DIASTOLIC BLOOD PRESSURE: 86 MMHG

## 2023-12-01 DIAGNOSIS — Z34.91 PRENATAL CARE IN FIRST TRIMESTER: Primary | ICD-10-CM

## 2023-12-01 DIAGNOSIS — N89.8 VAGINAL DISCHARGE: ICD-10-CM

## 2023-12-01 LAB
CITATION REF LAB TEST: NORMAL
CLINICAL INFO: NORMAL
ETHNIC BACKGROUND STATED: NORMAL
GENE DIS ANL CARRIER INTERP-IMP: NORMAL
GENE MUT TESTED BLD/T: NORMAL
LAB DIRECTOR NAME PROVIDER: NORMAL
REASON FOR REFERRAL (NARRATIVE): NORMAL
RECOMMENDATION PATIENT DOC-IMP: NORMAL
REF LAB TEST METHOD: NORMAL
SERVICE CMNT-IMP: NORMAL
SMN1 GENE MUT ANL BLD/T: NORMAL
SPECIMEN SOURCE: NORMAL

## 2023-12-01 PROCEDURE — 87070 CULTURE OTHR SPECIMN AEROBIC: CPT | Performed by: OBSTETRICS & GYNECOLOGY

## 2023-12-01 PROCEDURE — 87106 FUNGI IDENTIFICATION YEAST: CPT | Performed by: OBSTETRICS & GYNECOLOGY

## 2023-12-01 PROCEDURE — PNV: Performed by: OBSTETRICS & GYNECOLOGY

## 2023-12-01 PROCEDURE — 87491 CHLMYD TRACH DNA AMP PROBE: CPT | Performed by: OBSTETRICS & GYNECOLOGY

## 2023-12-01 PROCEDURE — 87591 N.GONORRHOEAE DNA AMP PROB: CPT | Performed by: OBSTETRICS & GYNECOLOGY

## 2023-12-01 NOTE — PROGRESS NOTES
Patient reports no fm, no n/v, cramping, bleeding, loss of fluid, edema, dom violence, or smoking. nancy pnv. Occasional headache urine negative negative patient here for problem visit with some lower abdominal crampy pain some sharp shooting side pain suspect possibly round ligament and some yellow discharge possibly some itching no odor. Abdominal ultrasound done no ovarian cyst seen positive fetal movement positive fetal heart tones everything appears appropriate palpation patient is just mildly tender to deep palpation  midline. There is some normal-appearing discharge in the vaginal area no erythema no signs of acute infection. GC chlamydia and general genital culture performed. Patient reports last Pap was a year ago. Will return for new OB to and physical will not need cultures at that time and about 2 to 3 weeks.   Discussed Motrin as needed for discomfort

## 2023-12-03 LAB — BACTERIA GENITAL AEROBE CULT: ABNORMAL

## 2023-12-04 ENCOUNTER — PATIENT MESSAGE (OUTPATIENT)
Dept: OBGYN CLINIC | Facility: CLINIC | Age: 28
End: 2023-12-04

## 2023-12-04 DIAGNOSIS — Z3A.01 LESS THAN 8 WEEKS GESTATION OF PREGNANCY: ICD-10-CM

## 2023-12-04 DIAGNOSIS — Z34.91 PRENATAL CARE IN FIRST TRIMESTER: Primary | ICD-10-CM

## 2023-12-04 LAB
C TRACH DNA SPEC QL NAA+PROBE: NEGATIVE
N GONORRHOEA DNA SPEC QL NAA+PROBE: NEGATIVE

## 2023-12-04 NOTE — PATIENT COMMUNICATION
Placed call to [address] [place] [address], spoke to [place]. CF was not completed/sent to Frantz Mullins call to [facility]. [facility], the cystic fibrosis test was not approved by patients insurance company. Reached out to Raquel Nava at the prior authorization department.  Awaiting a response

## 2023-12-05 LAB — BACTERIA GENITAL AEROBE CULT: NORMAL

## 2023-12-13 ENCOUNTER — APPOINTMENT (OUTPATIENT)
Dept: LAB | Facility: CLINIC | Age: 28
End: 2023-12-13
Payer: COMMERCIAL

## 2023-12-13 DIAGNOSIS — Z34.91 PRENATAL CARE IN FIRST TRIMESTER: ICD-10-CM

## 2023-12-13 DIAGNOSIS — Z3A.01 LESS THAN 8 WEEKS GESTATION OF PREGNANCY: ICD-10-CM

## 2023-12-13 PROCEDURE — 36415 COLL VENOUS BLD VENIPUNCTURE: CPT

## 2023-12-13 PROCEDURE — 81220 CFTR GENE COM VARIANTS: CPT

## 2023-12-14 ENCOUNTER — OFFICE VISIT (OUTPATIENT)
Dept: FAMILY MEDICINE CLINIC | Facility: CLINIC | Age: 28
End: 2023-12-14
Payer: COMMERCIAL

## 2023-12-14 VITALS
TEMPERATURE: 97.6 F | WEIGHT: 221.8 LBS | BODY MASS INDEX: 36.96 KG/M2 | SYSTOLIC BLOOD PRESSURE: 142 MMHG | HEIGHT: 65 IN | OXYGEN SATURATION: 100 % | HEART RATE: 104 BPM | DIASTOLIC BLOOD PRESSURE: 88 MMHG

## 2023-12-14 DIAGNOSIS — J01.40 ACUTE NON-RECURRENT PANSINUSITIS: Primary | ICD-10-CM

## 2023-12-14 DIAGNOSIS — R03.0 ELEVATED BP WITHOUT DIAGNOSIS OF HYPERTENSION: ICD-10-CM

## 2023-12-14 PROCEDURE — 99214 OFFICE O/P EST MOD 30 MIN: CPT | Performed by: FAMILY MEDICINE

## 2023-12-14 RX ORDER — AZITHROMYCIN 250 MG/1
TABLET, FILM COATED ORAL
Qty: 6 TABLET | Refills: 0 | Status: SHIPPED | OUTPATIENT
Start: 2023-12-14 | End: 2023-12-18 | Stop reason: ALTCHOICE

## 2023-12-14 NOTE — PROGRESS NOTES
Name: Elisabet Oropeza      : 1995      MRN: [id number]  Encounter Provider: Lamberto Bhatt MD  Encounter Date: 2023   Encounter department: [facility]    Assessment & Plan     1. Acute non-recurrent pansinusitis  -     azithromycin (ZITHROMAX) 250 mg tablet; 2 tab today then 1 tab po qd x 4 d    2. Elevated BP without diagnosis of hypertension       Discussion: I reviewed all with pt  - in regards to pan sinusitis, pt is still in first trimester and had hives on Augmentin.  Will start z-pack as directed. Continue conservative care. Recheck 1w if not improving  - in regards to BP, I reviewed recent BP results with pt. Would avoid decongestants due to BP.  Pt has appt next week for regular follow up - will recheck BP at that visit.       Subjective     28-year-old female who is 11 weeks pregnant presents with a 2-week history of left greater than right maxillary and frontal sinus pain.  Patient has noted that since the beginning of her pregnancy she seems to be congested all the time.  She denies any fever or chills.  Over the last 2 weeks however she has developed thick nasal discharge to go along with this pain.  She also has noted left upper molar pain over the last 2 days.  Nasal discharge is thick and green.  She is on COVID testing which has been negative.  Patient denies any cough or shortness of breath  -Patient's blood pressure is also been mildly elevated during her pregnancy.  Blood pressure today is 142/88.  Systolics have been typically in the mid to high 130s with diastolics in the mid to high 80s.  GYN is aware.      Review of Systems   Constitutional:  Positive for fatigue. Negative for activity change, appetite change, chills and fever.   HENT:  Positive for postnasal drip, rhinorrhea, sinus pain and sore throat. Negative for ear pain, facial swelling and hearing loss.    Eyes:  Negative for pain, discharge, redness and itching.   Respiratory:   Positive for cough.    Cardiovascular:  Negative for chest pain.   Neurological:  Positive for headaches.       Past Medical History:   Diagnosis Date   • GERD (gastroesophageal reflux disease)    • Headache(784.0)     Stress headaches   • Ovarian cyst     last assessed 06/24/2014     Past Surgical History:   Procedure Laterality Date   • WISDOM TOOTH EXTRACTION  2018     Family History   Problem Relation Age of Onset   • Gallbladder disease Mother    • Migraines Mother    • Hypertension Father    • Asthma Father    • Endometriosis Sister         Partial Hyst   • Ulcerative colitis Maternal Grandmother    • Other Maternal Grandfather         cardiac disorder     Social History     Socioeconomic History   • Marital status: Single     Spouse name: None   • Number of children: None   • Years of education: None   • Highest education level: None   Occupational History     Employer: [employer]   Tobacco Use   • Smoking status: Never     Passive exposure: Never   • Smokeless tobacco: Never   Vaping Use   • Vaping status: Never Used   Substance and Sexual Activity   • Alcohol use: Not Currently     Alcohol/week: 2.0 standard drinks of alcohol     Types: 2 Glasses of wine per week     Comment: social, not at the moment, recovering from preg   • Drug use: Never   • Sexual activity: Yes     Partners: Male   Other Topics Concern   • None   Social History Narrative    Daily caffeine consumption, 1 serving per day    Exercises regularly     Social Determinants of Health     Financial Resource Strain: Not on file   Food Insecurity: Not on file   Transportation Needs: Not on file   Physical Activity: Not on file   Stress: Not on file   Social Connections: Not on file   Intimate Partner Violence: Not on file   Housing Stability: Not on file     Current Outpatient Medications on File Prior to Visit   Medication Sig   • busPIRone (BUSPAR) 5 mg tablet 1 TABLET TWICE DAILY X4 DAYS, THEN 1 & 1/2 TAB TWICE DAILY X 4  "DAYS, THEN 2 TABS TWICE DAILY   • Prenatal Vit-Fe Fumarate-FA (PRENATAL PO) Take by mouth     Allergies   Allergen Reactions   • Augmentin [Amoxicillin-Pot Clavulanate] Hives     Pt. Breaks up in hives     Immunization History   Administered Date(s) Administered   • COVID-19 PFIZER VACCINE 0.3 ML IM 03/27/2021, 04/17/2021, 11/20/2021   • DTP 1995, 1995, 1996, 12/11/1997   • DTaP 5 05/30/2001   • HPV Quadrivalent 01/07/2010, 03/10/2010, 07/13/2010   • Hep B, adult 1996, 1996, 03/11/1997   • Hib (PRP-OMP) 1996, 1996, 1996, 12/11/1997   • IPV 1996, 1996, 1996, 05/30/2001   • MMR 08/25/1997, 05/30/2001   • Meningococcal, Unknown Serogroups 05/29/2008, 06/11/2014   • Tdap 05/29/2008, 06/11/2014   • Tuberculin Skin Test-PPD Intradermal 05/06/1997   • Varicella 02/09/1998, 05/28/2008       Objective     /88   Pulse 104   Temp 97.6 °F (36.4 °C)   Ht 5' 5\" (1.651 m)   Wt 101 kg (221 lb 12.8 oz)   LMP 08/31/2023 (Approximate)   SpO2 100%   BMI 36.91 kg/m²     Physical Exam  Vitals reviewed.   HENT:      Head: Normocephalic.      Right Ear: Tympanic membrane, ear canal and external ear normal.      Left Ear: Tympanic membrane, ear canal and external ear normal.      Nose: Congestion present.      Comments: Frontal and maxillary sinuses TTP     Mouth/Throat:      Mouth: Mucous membranes are moist.   Eyes:      Extraocular Movements: Extraocular movements intact.      Conjunctiva/sclera: Conjunctivae normal.      Pupils: Pupils are equal, round, and reactive to light.   Cardiovascular:      Rate and Rhythm: Normal rate and regular rhythm.      Pulses: Normal pulses.   Pulmonary:      Effort: Pulmonary effort is normal.   Musculoskeletal:      Cervical back: No tenderness.      Right lower leg: No edema.      Left lower leg: No edema.   Lymphadenopathy:      Cervical: No cervical adenopathy.   Skin:     General: Skin is warm.      Capillary Refill: " Capillary refill takes less than 2 seconds.   Neurological:      Mental Status: She is alert.       Lamberto Bhatt MD

## 2023-12-18 ENCOUNTER — ULTRASOUND (OUTPATIENT)
Dept: OBGYN CLINIC | Facility: CLINIC | Age: 28
End: 2023-12-18
Payer: COMMERCIAL

## 2023-12-18 VITALS — SYSTOLIC BLOOD PRESSURE: 124 MMHG | BODY MASS INDEX: 36.28 KG/M2 | DIASTOLIC BLOOD PRESSURE: 84 MMHG | WEIGHT: 218 LBS

## 2023-12-18 DIAGNOSIS — Z34.01 ENCOUNTER FOR SUPERVISION OF NORMAL FIRST PREGNANCY IN FIRST TRIMESTER: Primary | ICD-10-CM

## 2023-12-18 DIAGNOSIS — Z23 NEED FOR INFLUENZA VACCINATION: ICD-10-CM

## 2023-12-18 PROCEDURE — 90471 IMMUNIZATION ADMIN: CPT

## 2023-12-18 PROCEDURE — 90686 IIV4 VACC NO PRSV 0.5 ML IM: CPT

## 2023-12-18 PROCEDURE — PNV: Performed by: PHYSICIAN ASSISTANT

## 2023-12-20 ENCOUNTER — APPOINTMENT (OUTPATIENT)
Dept: LAB | Facility: MEDICAL CENTER | Age: 28
End: 2023-12-20
Payer: COMMERCIAL

## 2023-12-20 ENCOUNTER — TELEPHONE (OUTPATIENT)
Dept: OBGYN CLINIC | Facility: CLINIC | Age: 28
End: 2023-12-20

## 2023-12-20 DIAGNOSIS — R10.2 PELVIC PAIN AFFECTING PREGNANCY IN FIRST TRIMESTER, ANTEPARTUM: Primary | ICD-10-CM

## 2023-12-20 DIAGNOSIS — O26.891 PELVIC PAIN AFFECTING PREGNANCY IN FIRST TRIMESTER, ANTEPARTUM: Primary | ICD-10-CM

## 2023-12-20 DIAGNOSIS — R10.2 PELVIC PAIN AFFECTING PREGNANCY IN FIRST TRIMESTER, ANTEPARTUM: ICD-10-CM

## 2023-12-20 DIAGNOSIS — O26.891 PELVIC PAIN AFFECTING PREGNANCY IN FIRST TRIMESTER, ANTEPARTUM: ICD-10-CM

## 2023-12-20 LAB
BACTERIA UR QL AUTO: ABNORMAL /HPF
BILIRUB UR QL STRIP: NEGATIVE
CLARITY UR: CLEAR
COLOR UR: COLORLESS
GLUCOSE UR STRIP-MCNC: NEGATIVE MG/DL
HGB UR QL STRIP.AUTO: NEGATIVE
KETONES UR STRIP-MCNC: ABNORMAL MG/DL
LEUKOCYTE ESTERASE UR QL STRIP: NEGATIVE
NITRITE UR QL STRIP: NEGATIVE
NON-SQ EPI CELLS URNS QL MICRO: ABNORMAL /HPF
PH UR STRIP.AUTO: 7 [PH]
PROT UR STRIP-MCNC: NEGATIVE MG/DL
RBC #/AREA URNS AUTO: ABNORMAL /HPF
SP GR UR STRIP.AUTO: 1.01 (ref 1–1.03)
UROBILINOGEN UR STRIP-ACNC: <2 MG/DL
WBC #/AREA URNS AUTO: ABNORMAL /HPF

## 2023-12-20 PROCEDURE — 81001 URINALYSIS AUTO W/SCOPE: CPT | Performed by: OBSTETRICS & GYNECOLOGY

## 2023-12-20 PROCEDURE — 87086 URINE CULTURE/COLONY COUNT: CPT

## 2023-12-20 NOTE — TELEPHONE ENCOUNTER
Patient called said she is having consistent cramping, sometimes getting worse across her belly below the bellybutton down to the pelvic area. Has a thick clear discharge. Tylenol is not helping. I spoke with the on call  And her recommendation is below.     She should continue to monitor. I recommend aggressive hydration, rest, and I will place an order for an outpatient urine culture. If she has bleeding or if she is unable to tolerate the pain at home with the above instructions and up to 1000mg of Tylenol every 6 hours, then she should call again. Thanks!    Patient is aware of recommendations.

## 2023-12-21 ENCOUNTER — TELEPHONE (OUTPATIENT)
Dept: OBGYN CLINIC | Facility: CLINIC | Age: 28
End: 2023-12-21

## 2023-12-21 ENCOUNTER — ROUTINE PRENATAL (OUTPATIENT)
Dept: OBGYN CLINIC | Facility: CLINIC | Age: 28
End: 2023-12-21

## 2023-12-21 VITALS — WEIGHT: 217 LBS | SYSTOLIC BLOOD PRESSURE: 124 MMHG | DIASTOLIC BLOOD PRESSURE: 90 MMHG | BODY MASS INDEX: 36.11 KG/M2

## 2023-12-21 DIAGNOSIS — Z34.91 PRENATAL CARE IN FIRST TRIMESTER: ICD-10-CM

## 2023-12-21 DIAGNOSIS — Z3A.12 12 WEEKS GESTATION OF PREGNANCY: Primary | ICD-10-CM

## 2023-12-21 DIAGNOSIS — R10.31 RIGHT LOWER QUADRANT ABDOMINAL PAIN: ICD-10-CM

## 2023-12-21 LAB — BACTERIA UR CULT: NORMAL

## 2023-12-21 PROCEDURE — PNV: Performed by: STUDENT IN AN ORGANIZED HEALTH CARE EDUCATION/TRAINING PROGRAM

## 2023-12-21 NOTE — PROGRESS NOTES
Elisabet is a 28 y.o.  12w0d. Reports ++FM, no LOF, VB, or regular contractions.     Yesterday had RLQ pain, 8-9/10, worsened with movement, not improved with tylenol, rest, hydration as well as severe nausea  Today pain much more mild 5/10 with movement, none at rest  Nausea very mild today, has a more normal appetite today  No fevers    Vitals:    23 1125   BP: 124/90   Gen: alert, no acute distress  Pulm: normal respiratory effort  Abd: soft, ttp in RLQ, no rebound or guarding  Negative psoas sign bilaterally  Pelvic: normal vaginal discharge, no bleeding, cervix closed, no vaginal ttp, no CMT    +FHTs on TAUS, no obvious ovarian cysts      A/P:  Most consistent with MSK vs round ligament pain  UA/Ucx wnl yesterday  No concern for ovarian pathology, infection or contractions on assessment today  Timeline, severity, and exam not consistent with appendicitis  Reviewed precautions--if pain persistent or worsens or associated with new symptoms like severe nausea, fevers, etc. Should present for repeat evaluation    All questions answered to the best of my ability.

## 2023-12-21 NOTE — TELEPHONE ENCOUNTER
Patient is going to the [place] office today at 1130. Confirmed with both provider and patient.

## 2023-12-21 NOTE — TELEPHONE ENCOUNTER
On call provider would like patient to come in to office for an ultrasound. On call provider will be able to see patient in the [place] office before 1pm. Will check with patient to see if she can go to the [place] office today and be seen.    Patient advised to call if any problems, questions, or concerns.

## 2023-12-21 NOTE — TELEPHONE ENCOUNTER
Pt called, is having right sided pain. Is hydrating, resting taking tylenol and is still having pain. It feels tender and sore it especially hurts when she moves or walks around. Called yesterday and told to call back if she still is in pain. Patient has a urine culture pending. Messaged on call provider to see what she recommends.

## 2023-12-26 ENCOUNTER — TELEPHONE (OUTPATIENT)
Dept: OBGYN CLINIC | Facility: CLINIC | Age: 28
End: 2023-12-26

## 2023-12-26 ENCOUNTER — ROUTINE PRENATAL (OUTPATIENT)
Dept: PERINATAL CARE | Facility: CLINIC | Age: 28
End: 2023-12-26
Payer: COMMERCIAL

## 2023-12-26 VITALS
SYSTOLIC BLOOD PRESSURE: 126 MMHG | HEART RATE: 99 BPM | HEIGHT: 65 IN | BODY MASS INDEX: 36.39 KG/M2 | WEIGHT: 218.4 LBS | DIASTOLIC BLOOD PRESSURE: 70 MMHG

## 2023-12-26 DIAGNOSIS — Z36.82 ENCOUNTER FOR (NT) NUCHAL TRANSLUCENCY SCAN: Primary | ICD-10-CM

## 2023-12-26 DIAGNOSIS — Z3A.12 12 WEEKS GESTATION OF PREGNANCY: ICD-10-CM

## 2023-12-26 DIAGNOSIS — Z34.91 PRENATAL CARE IN FIRST TRIMESTER: ICD-10-CM

## 2023-12-26 DIAGNOSIS — O99.211 OBESITY AFFECTING PREGNANCY IN FIRST TRIMESTER, UNSPECIFIED OBESITY TYPE: ICD-10-CM

## 2023-12-26 PROCEDURE — 76813 OB US NUCHAL MEAS 1 GEST: CPT | Performed by: STUDENT IN AN ORGANIZED HEALTH CARE EDUCATION/TRAINING PROGRAM

## 2023-12-26 PROCEDURE — 76801 OB US < 14 WKS SINGLE FETUS: CPT | Performed by: STUDENT IN AN ORGANIZED HEALTH CARE EDUCATION/TRAINING PROGRAM

## 2023-12-26 PROCEDURE — 36415 COLL VENOUS BLD VENIPUNCTURE: CPT | Performed by: STUDENT IN AN ORGANIZED HEALTH CARE EDUCATION/TRAINING PROGRAM

## 2023-12-26 PROCEDURE — 99243 OFF/OP CNSLTJ NEW/EST LOW 30: CPT | Performed by: STUDENT IN AN ORGANIZED HEALTH CARE EDUCATION/TRAINING PROGRAM

## 2023-12-26 NOTE — PROGRESS NOTES
"Patient chose to have [employer] Non-invasive Prenatal Screen with fetal sex.   Patient choose billed through insurance.   Patient assistance program (PAP) application provided to patient no.  PAP sent with specimen No.     Patient given brochure and is aware [employer] will contact their insurance and coordinate coverage.  Patient made aware she will receive a text message and e-mail from [employer].   Patient informed text/phone call message will come from area code  \"415\".  Provided [employer] Client Services # [phone] and web site at [email or web address].   \"Frederick your test online\" card with barcode and test tube ID provided to patient.   Reviewed [employer]'s web site states 5-7 business days for results via their portal.   [employer] message will be sent to patient when [facility] receives results /provider reviews.    2 vials of blood drawn from  right arm by NOEL Steele RNC-OB .   Patient tolerated blood draw without difficulty.  Specimens labeled with patient identifiers (name, date of birth, specimen collection date), order and specimen were verified with patient, packed and sent via [employer] lab .  FED EX  tracking # [id number]  Copy of lab order scanned to Epic media.    Maternal Fetal Medicine will have results in approximately 7-10 business days and will call patient or notify via [employer].  Patient aware viewing lab result online will reveal fetal sex if ordered.    Patient verbalized understanding of all instructions and no questions at this time.   "

## 2023-12-26 NOTE — TELEPHONE ENCOUNTER
----- Message from Elisabet Oropeza sent at 12/26/2023 10:27 AM EST -----  Regarding: Pregnancy Essentials Guide  Contact: [phone]  Good morning! Maternal fetal medicine recommended I take 2 baby aspirin a day since my dad has hypertension and I have had a few higher blood pressures recently. Do you guys agree and should I take them both together in the morning or do I take one morning and one night?

## 2023-12-26 NOTE — LETTER
"2023     Antonieta Hatch MD  [address] [facility] [address]    Patient: Elisabet Oropeza   YOB: 1995   Date of Visit: 2023       Dear Dr. Hatch:    Thank you for referring Elisabet Oropeza to me for evaluation. Below are my notes for this consultation.    If you have questions, please do not hesitate to call me. I look forward to following your patient along with you.         Sincerely,        Phyllis Chairez MD        CC: No Recipients    Phyllis Chairez MD  2023  9:22 AM  Sign when Signing Visit  [facility]: Ms. Oropeza was seen today for nuchal translucency ultrasound.  See ultrasound report under \"OB Procedures\" tab.      Physical Exam  Constitutional:       General: She is not in acute distress.     Appearance: Normal appearance.   HENT:      Head: Normocephalic and atraumatic.   Eyes:      Extraocular Movements: Extraocular movements intact.   Cardiovascular:      Rate and Rhythm: Normal rate.   Pulmonary:      Effort: Pulmonary effort is normal. No respiratory distress.   Skin:     Findings: No erythema or rash.   Neurological:      Mental Status: She is alert and oriented to person, place, and time.   Psychiatric:         Mood and Affect: Mood normal.         Behavior: Behavior normal.         Please don't hesitate to contact our office with any concerns or questions.  -Phyllis Chairez MD    "

## 2023-12-26 NOTE — PROGRESS NOTES
"[facility]: Ms. Oropeza was seen today for nuchal translucency ultrasound.  See ultrasound report under \"OB Procedures\" tab.      Physical Exam  Constitutional:       General: She is not in acute distress.     Appearance: Normal appearance.   HENT:      Head: Normocephalic and atraumatic.   Eyes:      Extraocular Movements: Extraocular movements intact.   Cardiovascular:      Rate and Rhythm: Normal rate.   Pulmonary:      Effort: Pulmonary effort is normal. No respiratory distress.   Skin:     Findings: No erythema or rash.   Neurological:      Mental Status: She is alert and oriented to person, place, and time.   Psychiatric:         Mood and Affect: Mood normal.         Behavior: Behavior normal.         Please don't hesitate to contact our office with any concerns or questions.  -Phyllis Chairez MD    "

## 2024-01-02 LAB
CF COMMENT: NORMAL
CFTR MUT ANL BLD/T: NORMAL

## 2024-01-04 ENCOUNTER — RA CDI HCC (OUTPATIENT)
Dept: OTHER | Facility: HOSPITAL | Age: 29
End: 2024-01-04

## 2024-01-05 ENCOUNTER — OFFICE VISIT (OUTPATIENT)
Dept: FAMILY MEDICINE CLINIC | Facility: CLINIC | Age: 29
End: 2024-01-05
Payer: COMMERCIAL

## 2024-01-05 VITALS
WEIGHT: 219.2 LBS | BODY MASS INDEX: 36.52 KG/M2 | SYSTOLIC BLOOD PRESSURE: 120 MMHG | DIASTOLIC BLOOD PRESSURE: 80 MMHG | HEART RATE: 80 BPM | TEMPERATURE: 97 F | OXYGEN SATURATION: 98 % | HEIGHT: 65 IN

## 2024-01-05 DIAGNOSIS — F41.1 GAD (GENERALIZED ANXIETY DISORDER): ICD-10-CM

## 2024-01-05 PROCEDURE — 99213 OFFICE O/P EST LOW 20 MIN: CPT | Performed by: FAMILY MEDICINE

## 2024-01-05 RX ORDER — BUSPIRONE HYDROCHLORIDE 5 MG/1
TABLET ORAL
Status: SHIPPED
Start: 2024-01-05 | End: 2024-01-08

## 2024-01-05 NOTE — PROGRESS NOTES
Name: Elisabet Oropeza      : 1995      MRN: [id number]  Encounter Provider: Lamberto Bhatt MD  Encounter Date: 2024   Encounter department: [facility]    Assessment & Plan     1. TONEY (generalized anxiety disorder)  Assessment & Plan:  Doing well on buspirone 7.5mg bid. Recheck 3m    Orders:  -     busPIRone (BUSPAR) 5 mg tablet; 1.5 tabs BID         Subjective     f/u multiple med issues  - pt states that her anxiety is doing better now that she is in her 2nd trimester (presently 14.5w). Continues to take Buspar 7.5mg bid  - sinus infection resolved. Pt denies worsening nasal congestion.   - doing well with her pregnancy. Feels better now that she is in her second trimester.   - no new CV, resp, GI or  complaints      Review of Systems   Constitutional: Negative.    HENT: Negative.     Eyes: Negative.    Respiratory: Negative.     Cardiovascular: Negative.    Gastrointestinal: Negative.    Endocrine: Negative.    Genitourinary: Negative.    Musculoskeletal: Negative.    Skin: Negative.    Allergic/Immunologic: Negative.    Neurological: Negative.    Hematological: Negative.    Psychiatric/Behavioral: Negative.         Past Medical History:   Diagnosis Date   • GERD (gastroesophageal reflux disease)    • Headache(784.0)     Stress headaches   • Ovarian cyst     last assessed 2014     Past Surgical History:   Procedure Laterality Date   • WISDOM TOOTH EXTRACTION  2018     Family History   Problem Relation Age of Onset   • Gallbladder disease Mother    • Migraines Mother    • Hypertension Father    • Asthma Father    • Endometriosis Sister         Partial Hyst   • Ulcerative colitis Maternal Grandmother    • Other Maternal Grandfather         cardiac disorder     Social History     Socioeconomic History   • Marital status: Single     Spouse name: None   • Number of children: None   • Years of education: None   • Highest education level: None   Occupational  History     Employer: [employer]   Tobacco Use   • Smoking status: Never     Passive exposure: Never   • Smokeless tobacco: Never   Vaping Use   • Vaping status: Never Used   Substance and Sexual Activity   • Alcohol use: Not Currently     Alcohol/week: 2.0 standard drinks of alcohol     Types: 2 Glasses of wine per week     Comment: social, not at the moment, recovering from preg   • Drug use: Never   • Sexual activity: Yes     Partners: Male   Other Topics Concern   • None   Social History Narrative    Daily caffeine consumption, 1 serving per day    Exercises regularly     Social Determinants of Health     Financial Resource Strain: Not on file   Food Insecurity: Not on file   Transportation Needs: Not on file   Physical Activity: Not on file   Stress: Not on file   Social Connections: Not on file   Intimate Partner Violence: Not on file   Housing Stability: Not on file     Current Outpatient Medications on File Prior to Visit   Medication Sig   • aspirin (ECOTRIN LOW STRENGTH) 81 mg EC tablet Take 81 mg by mouth daily   • Prenatal Vit-Fe Fumarate-FA (PRENATAL PO) Take by mouth     Allergies   Allergen Reactions   • Augmentin [Amoxicillin-Pot Clavulanate] Hives     Pt. Breaks up in hives     Immunization History   Administered Date(s) Administered   • COVID-19 PFIZER VACCINE 0.3 ML IM 03/27/2021, 04/17/2021, 11/20/2021   • DTP 1995, [birth year], 12/11/1997   • DTaP 5 05/30/2001   • HPV Quadrivalent 01/07/2010, 03/10/2010, 07/13/2010   • Hep B, adult 1995, 1995, 03/11/1997   • Hib (PRP-OMP) 1995, 1995, 1996, 12/11/1997   • IPV 1995, 1995, 1996, 05/30/2001   • Influenza, injectable, quadrivalent, preservative free 0.5 mL 12/18/2023   • MMR 08/25/1997, 05/30/2001   • Meningococcal, Unknown Serogroups 05/29/2008, 06/11/2014   • Tdap 05/29/2008, 06/11/2014   • Tuberculin Skin Test-PPD Intradermal 05/06/1997   • Varicella 02/09/1998,  "05/28/2008       Objective     /80 (BP Location: Left arm, Patient Position: Sitting, Cuff Size: Large)   Pulse 80   Temp (!) 97 °F (36.1 °C)   Ht 5' 5.25\" (1.657 m)   Wt 99.4 kg (219 lb 3.2 oz)   LMP 08/31/2023 (Approximate)   SpO2 98%   BMI 36.20 kg/m²     Physical Exam  Vitals reviewed.   HENT:      Head: Normocephalic.      Mouth/Throat:      Mouth: Mucous membranes are moist.   Eyes:      Extraocular Movements: Extraocular movements intact.      Conjunctiva/sclera: Conjunctivae normal.      Pupils: Pupils are equal, round, and reactive to light.   Cardiovascular:      Rate and Rhythm: Normal rate and regular rhythm.   Pulmonary:      Effort: Pulmonary effort is normal.   Musculoskeletal:      Cervical back: No tenderness.   Lymphadenopathy:      Cervical: No cervical adenopathy.   Skin:     General: Skin is warm.      Capillary Refill: Capillary refill takes less than 2 seconds.   Neurological:      General: No focal deficit present.      Mental Status: She is alert and oriented to person, place, and time.   Psychiatric:         Mood and Affect: Mood normal.         Behavior: Behavior normal.         Thought Content: Thought content normal.         Judgment: Judgment normal.      Comments: PHQ-2/9 Depression Screening    Little interest or pleasure in doing things: 0 - not at all  Feeling down, depressed, or hopeless: 0 - not at all  PHQ-2 Score: 0  PHQ-2 Interpretation: Negative depression screen     TONEY-7 Flowsheet Screening    Flowsheet Row Most Recent Value   Over the last 2 weeks, how often have you been bothered by any of the   following problems?    Feeling nervous, anxious, or on edge 1   Not being able to stop or control worrying 1   Worrying too much about different things 1   Trouble relaxing 0   Being so restless that it is hard to sit still 0   Becoming easily annoyed or irritable 1   Feeling afraid as if something awful might happen 0   TONEY-7 Total Score 4        Mukulopher " MD Miller

## 2024-01-06 DIAGNOSIS — F41.1 GAD (GENERALIZED ANXIETY DISORDER): ICD-10-CM

## 2024-01-08 ENCOUNTER — ROUTINE PRENATAL (OUTPATIENT)
Dept: OBGYN CLINIC | Facility: CLINIC | Age: 29
End: 2024-01-08

## 2024-01-08 VITALS
WEIGHT: 220.1 LBS | DIASTOLIC BLOOD PRESSURE: 70 MMHG | SYSTOLIC BLOOD PRESSURE: 124 MMHG | HEIGHT: 65 IN | BODY MASS INDEX: 36.67 KG/M2

## 2024-01-08 DIAGNOSIS — Z34.92 PRENATAL CARE IN SECOND TRIMESTER: ICD-10-CM

## 2024-01-08 DIAGNOSIS — O26.892 VAGINAL DISCHARGE DURING PREGNANCY IN SECOND TRIMESTER: ICD-10-CM

## 2024-01-08 DIAGNOSIS — F41.1 GAD (GENERALIZED ANXIETY DISORDER): ICD-10-CM

## 2024-01-08 DIAGNOSIS — N89.8 VAGINAL DISCHARGE DURING PREGNANCY IN SECOND TRIMESTER: ICD-10-CM

## 2024-01-08 DIAGNOSIS — Z3A.14 14 WEEKS GESTATION OF PREGNANCY: Primary | ICD-10-CM

## 2024-01-08 PROCEDURE — PNV: Performed by: STUDENT IN AN ORGANIZED HEALTH CARE EDUCATION/TRAINING PROGRAM

## 2024-01-08 RX ORDER — BUSPIRONE HYDROCHLORIDE 5 MG/1
TABLET ORAL
Qty: 90 TABLET | Refills: 5 | Status: SHIPPED | OUTPATIENT
Start: 2024-01-08

## 2024-01-08 RX ORDER — BUSPIRONE HYDROCHLORIDE 5 MG/1
TABLET ORAL
Qty: 120 TABLET | Refills: 1 | Status: SHIPPED | OUTPATIENT
Start: 2024-01-08 | End: 2024-01-08 | Stop reason: SDUPTHER

## 2024-01-08 NOTE — PROGRESS NOTES
Elisabet is a 28 y.o.  14w4d. Reports ++flutters. No VB    Vaginal discharge  Yellowish  No bleeding  Minimal pruritus  Some introital pain  Feels a little bit like yeast infection she had before, but not as bad  Today most symptoms have resolved    Vitals:    24 1200   BP: 124/70     +FHTs  SSE: normal white discharge, no blood, vaginal without lesions, erythema or irritations, cervix closed, no ttp    A/P:  cfDNA wnl  AFP ordered  Molecular swab collected today    Rh status POS    Discussed pre-term labor precautions  Return to office in 4 weeks

## 2024-01-09 ENCOUNTER — PATIENT MESSAGE (OUTPATIENT)
Dept: OBGYN CLINIC | Facility: CLINIC | Age: 29
End: 2024-01-09

## 2024-01-10 ENCOUNTER — TELEPHONE (OUTPATIENT)
Dept: OBGYN CLINIC | Facility: CLINIC | Age: 29
End: 2024-01-10

## 2024-01-10 ENCOUNTER — PATIENT MESSAGE (OUTPATIENT)
Dept: OBGYN CLINIC | Facility: CLINIC | Age: 29
End: 2024-01-10

## 2024-01-10 LAB
C GLABRATA DNA VAG QL NAA+PROBE: NEGATIVE
C KRUSEI DNA VAG QL NAA+PROBE: NEGATIVE
CANDIDA SP 6 PNL VAG NAA+PROBE: NEGATIVE
T VAGINALIS DNA VAG QL NAA+PROBE: NEGATIVE
VAGINOSIS/ITIS DNA PNL VAG PROBE+SIG AMP: NEGATIVE

## 2024-01-10 NOTE — TELEPHONE ENCOUNTER
Placed call to [facility], spoke to Madelyn. Collection must be in a APTIMA collection kit not the BD MAX MVP collection tube.  Lab reordered is correct however a new sample will need to be collected.  [employer] message sent to patient (okay per communication form) offering 1/12/24 at 8 AM with Zuleika.

## 2024-01-10 NOTE — TELEPHONE ENCOUNTER
"----- Message from Sangeeta Pritchett MD sent at 1/9/2024 10:22 PM EST -----  Regarding: RE: Cancellation of Order # [id number]  I wanted a molecular vaginal panel for her which is why I believe I collected the green top tube for her.  I placed a new ordered, but not sure if it's correct. If not, can someone please help me with correct order so she doesn't need to get repeat swab? Thanks    If there's no way to reorder and run, she will need to return to repeat swab. Thanks!    ----- Message -----  From: Lamberto Pemberton  Sent: 1/9/2024   5:09 PM EST  To: Sanegeta Pritchett MD  Subject: Cancellation of Order # [id number]                Order number [id number] for TRICHOMONAS VAGINALIS, CHING [[id number]] has been canceled by Lamberto Pemberton [[id number]].  This was ordered by you on Jan 8, 2024.  Reason for Cancellation: \"Improperly Collected\"  Cancel Comments: specimen received in a BD MAX MVP collection tube, cannot perform test off this container.    "

## 2024-01-10 NOTE — PATIENT COMMUNICATION
Placed call to [facility], spoke to Shonda. They were able to process the specimen with the molecular vaginal panel. No recollection is needed.

## 2024-02-01 ENCOUNTER — HOSPITAL ENCOUNTER (EMERGENCY)
Facility: HOSPITAL | Age: 29
Discharge: HOME | End: 2024-02-01
Attending: EMERGENCY MEDICINE
Payer: COMMERCIAL

## 2024-02-01 VITALS
OXYGEN SATURATION: 98 % | TEMPERATURE: 98.4 F | DIASTOLIC BLOOD PRESSURE: 69 MMHG | SYSTOLIC BLOOD PRESSURE: 120 MMHG | RESPIRATION RATE: 18 BRPM | HEART RATE: 90 BPM

## 2024-02-01 DIAGNOSIS — R00.2 PALPITATIONS: Primary | ICD-10-CM

## 2024-02-01 LAB
ALBUMIN SERPL BCP-MCNC: 3.5 G/DL (ref 3.5–5)
ALP SERPL-CCNC: 51 U/L (ref 34–104)
ALT SERPL W P-5'-P-CCNC: 8 U/L (ref 7–52)
ANION GAP SERPL CALCULATED.3IONS-SCNC: 9 MMOL/L
AST SERPL W P-5'-P-CCNC: 10 U/L (ref 13–39)
ATRIAL RATE: 99 BPM
BASOPHILS # BLD AUTO: 0.03 THOUSANDS/ÂΜL (ref 0–0.1)
BASOPHILS NFR BLD AUTO: 0 % (ref 0–1)
BILIRUB SERPL-MCNC: 0.23 MG/DL (ref 0.2–1)
BUN SERPL-MCNC: 8 MG/DL (ref 5–25)
CALCIUM SERPL-MCNC: 8.5 MG/DL (ref 8.4–10.2)
CHLORIDE SERPL-SCNC: 106 MMOL/L (ref 96–108)
CO2 SERPL-SCNC: 20 MMOL/L (ref 21–32)
CREAT SERPL-MCNC: 0.51 MG/DL (ref 0.6–1.3)
EOSINOPHIL # BLD AUTO: 0.04 THOUSAND/ÂΜL (ref 0–0.61)
EOSINOPHIL NFR BLD AUTO: 0 % (ref 0–6)
ERYTHROCYTE [DISTWIDTH] IN BLOOD BY AUTOMATED COUNT: 14.7 % (ref 11.6–15.1)
GFR SERPL CREATININE-BSD FRML MDRD: 132 ML/MIN/1.73SQ M
GLUCOSE SERPL-MCNC: 83 MG/DL (ref 65–140)
HCT VFR BLD AUTO: 34.6 % (ref 34.8–46.1)
HGB BLD-MCNC: 11.6 G/DL (ref 11.5–15.4)
IMM GRANULOCYTES # BLD AUTO: 0.05 THOUSAND/UL (ref 0–0.2)
IMM GRANULOCYTES NFR BLD AUTO: 0 % (ref 0–2)
LYMPHOCYTES # BLD AUTO: 2.3 THOUSANDS/ÂΜL (ref 0.6–4.47)
LYMPHOCYTES NFR BLD AUTO: 21 % (ref 14–44)
MCH RBC QN AUTO: 31.5 PG (ref 26.8–34.3)
MCHC RBC AUTO-ENTMCNC: 33.5 G/DL (ref 31.4–37.4)
MCV RBC AUTO: 94 FL (ref 82–98)
MONOCYTES # BLD AUTO: 0.62 THOUSAND/ÂΜL (ref 0.17–1.22)
MONOCYTES NFR BLD AUTO: 6 % (ref 4–12)
NEUTROPHILS # BLD AUTO: 8.11 THOUSANDS/ÂΜL (ref 1.85–7.62)
NEUTS SEG NFR BLD AUTO: 73 % (ref 43–75)
NRBC BLD AUTO-RTO: 0 /100 WBCS
P AXIS: 66 DEGREES
PLATELET # BLD AUTO: 204 THOUSANDS/UL (ref 149–390)
PMV BLD AUTO: 10.6 FL (ref 8.9–12.7)
POTASSIUM SERPL-SCNC: 3.7 MMOL/L (ref 3.5–5.3)
PR INTERVAL: 146 MS
PROT SERPL-MCNC: 6.5 G/DL (ref 6.4–8.4)
QRS AXIS: 79 DEGREES
QRSD INTERVAL: 82 MS
QT INTERVAL: 344 MS
QTC INTERVAL: 441 MS
RBC # BLD AUTO: 3.68 MILLION/UL (ref 3.81–5.12)
SODIUM SERPL-SCNC: 135 MMOL/L (ref 135–147)
T WAVE AXIS: -6 DEGREES
VENTRICULAR RATE: 99 BPM
WBC # BLD AUTO: 11.15 THOUSAND/UL (ref 4.31–10.16)

## 2024-02-01 PROCEDURE — 99285 EMERGENCY DEPT VISIT HI MDM: CPT | Performed by: EMERGENCY MEDICINE

## 2024-02-01 PROCEDURE — 99285 EMERGENCY DEPT VISIT HI MDM: CPT

## 2024-02-01 PROCEDURE — 96360 HYDRATION IV INFUSION INIT: CPT

## 2024-02-01 PROCEDURE — 36415 COLL VENOUS BLD VENIPUNCTURE: CPT

## 2024-02-01 PROCEDURE — 85025 COMPLETE CBC W/AUTO DIFF WBC: CPT

## 2024-02-01 PROCEDURE — 80053 COMPREHEN METABOLIC PANEL: CPT

## 2024-02-01 PROCEDURE — 93005 ELECTROCARDIOGRAM TRACING: CPT

## 2024-02-01 RX ADMIN — SODIUM CHLORIDE 1000 ML: 0.9 INJECTION, SOLUTION INTRAVENOUS at 11:59

## 2024-02-01 NOTE — ED PROVIDER NOTES
History  Chief Complaint   Patient presents with    Palpitations     Pt c/o palpitations for a week along with feeling lightheaded. Sent in for further eval by OB. 18 weeks pregnant. Deneis abdominal pain/cramping, vaginal bleeding/discharge      HPI  Patient is a G1, P0 28-year-old female 18 weeks pregnant.  Presenting today with palpitations.  She states that for the last week she has noticed that her heart feels like it is fluttering at times.  This typically come and go but recently has become more common so she came to the emergency department.  She is denying chest pain or shortness of breath.  She does endorse some lightheadedness during the fluttering.  She is denying any other symptoms at this time.  She does add that she has a history of anxiety which she takes BuSpar for.  Her anxiety has been worse since becoming pregnant.  She denies any ongoing problems with the pregnancy or any new medications.    Prior to Admission Medications   Prescriptions Last Dose Informant Patient Reported? Taking?   Prenatal Vit-Fe Fumarate-FA (PRENATAL PO)  Self Yes No   Sig: Take by mouth   aspirin (ECOTRIN LOW STRENGTH) 81 mg EC tablet   Yes No   Sig: Take 81 mg by mouth daily   busPIRone (BUSPAR) 5 mg tablet   No No   Si & 1/2 TABLETS TWICE DAILY      Facility-Administered Medications: None       Past Medical History:   Diagnosis Date    GERD (gastroesophageal reflux disease)     Headache(784.0)     Stress headaches    Ovarian cyst     last assessed 2014       Past Surgical History:   Procedure Laterality Date    WISDOM TOOTH EXTRACTION  2018       Family History   Problem Relation Age of Onset    Gallbladder disease Mother     Migraines Mother     Hypertension Father     Asthma Father     Endometriosis Sister         Partial Hyst    Ulcerative colitis Maternal Grandmother     Other Maternal Grandfather         cardiac disorder     I have reviewed and agree with the history as documented.    E-Cigarette/Vaping     E-Cigarette Use Never User      E-Cigarette/Vaping Substances    Nicotine No     THC No     CBD No     Flavoring No     Other No     Unknown No      Social History     Tobacco Use    Smoking status: Never     Passive exposure: Never    Smokeless tobacco: Never   Vaping Use    Vaping status: Never Used   Substance Use Topics    Alcohol use: Not Currently     Alcohol/week: 2.0 standard drinks of alcohol     Types: 2 Glasses of wine per week     Comment: social, not at the moment, recovering from preg    Drug use: Never        Review of Systems   Constitutional:  Negative for chills, fatigue and fever.   HENT: Negative.     Respiratory:  Negative for shortness of breath.    Cardiovascular:  Positive for palpitations. Negative for chest pain and leg swelling.   Gastrointestinal:  Negative for abdominal pain, constipation, diarrhea, nausea and vomiting.   Genitourinary:  Negative for dysuria and vaginal bleeding.   Musculoskeletal:  Negative for arthralgias, back pain, joint swelling and myalgias.   Neurological:  Positive for light-headedness. Negative for dizziness, syncope, weakness and headaches.       Physical Exam  ED Triage Vitals   Temperature Pulse Respirations Blood Pressure SpO2   02/01/24 1108 02/01/24 1108 02/01/24 1108 02/01/24 1108 02/01/24 1108   98.4 °F (36.9 °C) (!) 108 18 136/81 100 %      Temp Source Heart Rate Source Patient Position - Orthostatic VS BP Location FiO2 (%)   02/01/24 1108 02/01/24 1108 02/01/24 1108 02/01/24 1108 --   Oral Monitor Sitting Right arm       Pain Score       02/01/24 1148       No Pain             Orthostatic Vital Signs  Vitals:    02/01/24 1108 02/01/24 1148   BP: 136/81 120/69   Pulse: (!) 108 90   Patient Position - Orthostatic VS: Sitting        Physical Exam  Constitutional:       Appearance: Normal appearance.   Cardiovascular:      Rate and Rhythm: Normal rate and regular rhythm.      Heart sounds: Normal heart sounds.   Pulmonary:      Effort: Pulmonary effort is  normal.      Breath sounds: Normal breath sounds.   Abdominal:      General: Bowel sounds are normal.      Tenderness: There is no abdominal tenderness.   Neurological:      General: No focal deficit present.      Mental Status: She is alert and oriented to person, place, and time.         ED Medications  Medications   sodium chloride 0.9 % bolus 1,000 mL (0 mL Intravenous Stopped 2/1/24 1324)       Diagnostic Studies  Results Reviewed       Procedure Component Value Units Date/Time    Comprehensive metabolic panel [[id number]]  (Abnormal) Collected: 02/01/24 1158    Lab Status: Final result Specimen: Blood from Arm, Right Updated: 02/01/24 1221     Sodium 135 mmol/L      Potassium 3.7 mmol/L      Chloride 106 mmol/L      CO2 20 mmol/L      ANION GAP 9 mmol/L      BUN 8 mg/dL      Creatinine 0.51 mg/dL      Glucose 83 mg/dL      Calcium 8.5 mg/dL      AST 10 U/L      ALT 8 U/L      Alkaline Phosphatase 51 U/L      Total Protein 6.5 g/dL      Albumin 3.5 g/dL      Total Bilirubin 0.23 mg/dL      eGFR 132 ml/min/1.73sq m     Narrative:      National Kidney Disease Foundation guidelines for Chronic Kidney Disease (CKD):     Stage 1 with normal or high GFR (GFR > 90 mL/min/1.73 square meters)    Stage 2 Mild CKD (GFR = 60-89 mL/min/1.73 square meters)    Stage 3A Moderate CKD (GFR = 45-59 mL/min/1.73 square meters)    Stage 3B Moderate CKD (GFR = 30-44 mL/min/1.73 square meters)    Stage 4 Severe CKD (GFR = 15-29 mL/min/1.73 square meters)    Stage 5 End Stage CKD (GFR <15 mL/min/1.73 square meters)  Note: GFR calculation is accurate only with a steady state creatinine    CBC and differential [[id number]]  (Abnormal) Collected: 02/01/24 1158    Lab Status: Final result Specimen: Blood from Arm, Right Updated: 02/01/24 1206     WBC 11.15 Thousand/uL      RBC 3.68 Million/uL      Hemoglobin 11.6 g/dL      Hematocrit 34.6 %      MCV 94 fL      MCH 31.5 pg      MCHC 33.5 g/dL      RDW 14.7 %      MPV 10.6 fL      Platelets  204 Thousands/uL      nRBC 0 /100 WBCs      Neutrophils Relative 73 %      Immat GRANS % 0 %      Lymphocytes Relative 21 %      Monocytes Relative 6 %      Eosinophils Relative 0 %      Basophils Relative 0 %      Neutrophils Absolute 8.11 Thousands/µL      Immature Grans Absolute 0.05 Thousand/uL      Lymphocytes Absolute 2.30 Thousands/µL      Monocytes Absolute 0.62 Thousand/µL      Eosinophils Absolute 0.04 Thousand/µL      Basophils Absolute 0.03 Thousands/µL                    No orders to display         Procedures  ECG 12 Lead Documentation Only    Date/Time: 2/1/2024 2:21 PM    Performed by: Eddi Mccormack DO  Authorized by: Eddi Mccormack DO    ECG reviewed by me, the ED Provider: yes    Patient location:  ED  Previous ECG:     Previous ECG:  Compared to current    Comparison to cardiac monitor: No    Interpretation:     Interpretation: normal    Rate:     ECG rate assessment: normal    QRS:     QRS axis:  Normal    QRS intervals:  Normal  Conduction:     Conduction: normal    ST segments:     ST segments:  Normal  T waves:     T waves: normal          ED Course                                       Medical Decision Making  Patient is a G1, P0 28-year-old female 18 weeks pregnant.  Presenting today with palpitations.  She states that for the last week she has noticed that her heart feels like it is fluttering at times.  This typically come and go but recently has become more common so she came to the emergency department.  She is denying chest pain or shortness of breath.  She does endorse some lightheadedness during the fluttering.  She is denying any other symptoms at this time.  She does add that she has a history of anxiety which she takes BuSpar for.  Her anxiety has been worse since becoming pregnant.  She denies any ongoing problems with the pregnancy or any new medications.    Physical exam unremarkable.  ECG normal.  Labs unremarkable.  Patient advised to follow-up with cardiology  outpatient.    Amount and/or Complexity of Data Reviewed  Labs: ordered.          Disposition  Final diagnoses:   Palpitations     Time reflects when diagnosis was documented in both MDM as applicable and the Disposition within this note       Time User Action Codes Description Comment    2/1/2024 12:54 PM Eddi Mccormack Add [R00.2] Palpitations           ED Disposition       ED Disposition   Discharge    Condition   Stable    Date/Time   u Feb 1, 2024 12:55 PM    Comment   Elisabet Oropeza discharge to home/self care.                   Follow-up Information    None         Discharge Medication List as of 2/1/2024 12:57 PM        CONTINUE these medications which have NOT CHANGED    Details   aspirin (ECOTRIN LOW STRENGTH) 81 mg EC tablet Take 81 mg by mouth daily, Historical Med      busPIRone (BUSPAR) 5 mg tablet 1 & 1/2 TABLETS TWICE DAILY, Normal      Prenatal Vit-Fe Fumarate-FA (PRENATAL PO) Take by mouth, Historical Med               PDMP Review       None             ED Provider  Attending physically available and evaluated Elisabet Oropeza. I managed the patient along with the ED Attending.    Electronically Signed by  Eddi Mccormack DO  PGY-1         Eddi Mccormack DO  02/01/24 8651

## 2024-02-02 ENCOUNTER — CONSULT (OUTPATIENT)
Dept: CARDIOLOGY CLINIC | Facility: MEDICAL CENTER | Age: 29
End: 2024-02-02
Payer: COMMERCIAL

## 2024-02-02 VITALS
DIASTOLIC BLOOD PRESSURE: 84 MMHG | HEART RATE: 99 BPM | HEIGHT: 65 IN | BODY MASS INDEX: 36.32 KG/M2 | SYSTOLIC BLOOD PRESSURE: 128 MMHG | WEIGHT: 218 LBS | OXYGEN SATURATION: 99 %

## 2024-02-02 DIAGNOSIS — R00.2 PALPITATIONS: ICD-10-CM

## 2024-02-02 PROCEDURE — 99204 OFFICE O/P NEW MOD 45 MIN: CPT | Performed by: INTERNAL MEDICINE

## 2024-02-02 NOTE — PATIENT INSTRUCTIONS
Recommendations:  Check 24 hour holter monitor.   Continue to monitor symptoms.  Follow up on an as needed basis.

## 2024-02-02 NOTE — PROGRESS NOTES
Cardiology   Elisabet Oropeza 28 y.o. female MRN: [id number]        Impression:  Palpitations - likely 2nd to increase in cardiovascular output with transition from 1st to 2nd trimester.  However, will want to evaluate for dysrhythmias.    Recommendations:  Check 24 hour holter monitor.   Continue to monitor symptoms.  Follow up on an as needed basis.       HPI: Elisabet Oropeza is a 28 y.o. year old female that is 18 weeks pregnant who presents for evaluation of palpitations.  For past week, feels heart racing and feels as if heart drops a beat. No previous symptoms.  No palpitations.  No chest pain or dyspnea.          Review of Systems   Constitutional: Negative.    HENT: Negative.     Eyes: Negative.    Respiratory:  Negative for chest tightness and shortness of breath.    Cardiovascular:  Positive for palpitations. Negative for chest pain and leg swelling.   Gastrointestinal: Negative.    Endocrine: Negative.    Genitourinary: Negative.    Musculoskeletal: Negative.    Skin: Negative.    Allergic/Immunologic: Negative.    Neurological: Negative.    Hematological: Negative.    Psychiatric/Behavioral: Negative.     All other systems reviewed and are negative.        Past Medical History:   Diagnosis Date    GERD (gastroesophageal reflux disease)     Headache(784.0)     Stress headaches    Ovarian cyst     last assessed 06/24/2014     Past Surgical History:   Procedure Laterality Date    WISDOM TOOTH EXTRACTION  2018     Social History     Substance and Sexual Activity   Alcohol Use Not Currently    Alcohol/week: 2.0 standard drinks of alcohol    Types: 2 Glasses of wine per week    Comment: social, not at the moment, recovering from preg     Social History     Substance and Sexual Activity   Drug Use Never     Social History     Tobacco Use   Smoking Status Never    Passive exposure: Never   Smokeless Tobacco Never     Family History   Problem Relation Age of Onset    Gallbladder disease Mother     Migraines  Mother     Hypertension Father     Asthma Father     Endometriosis Sister         Partial Hyst    Ulcerative colitis Maternal Grandmother     Other Maternal Grandfather         cardiac disorder       Allergies:  Allergies   Allergen Reactions    Augmentin [Amoxicillin-Pot Clavulanate] Hives     Pt. Breaks up in hives       Medications:     Current Outpatient Medications:     aspirin (ECOTRIN LOW STRENGTH) 81 mg EC tablet, Take 81 mg by mouth daily, Disp: , Rfl:     busPIRone (BUSPAR) 5 mg tablet, 1 & 1/2 TABLETS TWICE DAILY, Disp: 90 tablet, Rfl: 5    Prenatal Vit-Fe Fumarate-FA (PRENATAL PO), Take by mouth, Disp: , Rfl:   No current facility-administered medications for this visit.      Wt Readings from Last 3 Encounters:   02/02/24 98.9 kg (218 lb)   01/08/24 99.8 kg (220 lb 1.6 oz)   01/05/24 99.4 kg (219 lb 3.2 oz)     Temp Readings from Last 3 Encounters:   02/01/24 98.4 °F (36.9 °C) (Oral)   01/05/24 (!) 97 °F (36.1 °C)   12/14/23 97.6 °F (36.4 °C)     BP Readings from Last 3 Encounters:   02/02/24 128/84   02/01/24 120/69   01/08/24 124/70     Pulse Readings from Last 3 Encounters:   02/02/24 99   02/01/24 90   01/05/24 80         Physical Exam  HENT:      Head: Atraumatic.      Mouth/Throat:      Mouth: Mucous membranes are moist.   Eyes:      Extraocular Movements: Extraocular movements intact.   Cardiovascular:      Rate and Rhythm: Normal rate and regular rhythm.      Heart sounds: Normal heart sounds.   Pulmonary:      Effort: Pulmonary effort is normal.      Breath sounds: Normal breath sounds.   Abdominal:      General: Abdomen is flat.   Musculoskeletal:         General: Normal range of motion.      Cervical back: Normal range of motion.   Skin:     General: Skin is warm.   Neurological:      General: No focal deficit present.      Mental Status: She is alert and oriented to person, place, and time.   Psychiatric:         Mood and Affect: Mood normal.         Behavior: Behavior normal.  "          Laboratory Studies:  CMP:  Lab Results   Component Value Date     05/02/2014    K 3.7 02/01/2024     02/01/2024    CO2 20 (L) 02/01/2024    ANIONGAP 13 05/02/2014    BUN 8 02/01/2024    CREATININE 0.51 (L) 02/01/2024    GLUCOSE 118 05/02/2014    AST 10 (L) 02/01/2024    ALT 8 02/01/2024    EGFR 132 02/01/2024       Lipid Profile:   No results found for: \"CHOL\"  Lab Results   Component Value Date    HDL 55 07/29/2023     Lab Results   Component Value Date    LDLCALC 155 (H) 07/29/2023     Lab Results   Component Value Date    TRIG 189 (H) 07/29/2023       Cardiac testing:   EKG reviewed personally: NSR 99 NSSTTWA (2/1/24)        "

## 2024-02-03 ENCOUNTER — APPOINTMENT (OUTPATIENT)
Dept: LAB | Facility: CLINIC | Age: 29
End: 2024-02-03
Payer: COMMERCIAL

## 2024-02-03 DIAGNOSIS — Z3A.14 14 WEEKS GESTATION OF PREGNANCY: ICD-10-CM

## 2024-02-03 DIAGNOSIS — Z34.92 PRENATAL CARE IN SECOND TRIMESTER: ICD-10-CM

## 2024-02-03 PROCEDURE — 82105 ALPHA-FETOPROTEIN SERUM: CPT

## 2024-02-03 PROCEDURE — 36415 COLL VENOUS BLD VENIPUNCTURE: CPT

## 2024-02-05 LAB
2ND TRIMESTER 4 SCREEN SERPL-IMP: NORMAL
AFP ADJ MOM SERPL: 1.05
AFP INTERP AMN-IMP: NORMAL
AFP INTERP SERPL-IMP: NORMAL
AFP INTERP SERPL-IMP: NORMAL
AFP SERPL-MCNC: 38.1 NG/ML
AGE AT DELIVERY: 28.1 YR
GA METHOD: NORMAL
GA: 18.3 WEEKS
IDDM PATIENT QL: NO
MULTIPLE PREGNANCY: NO
NEURAL TUBE DEFECT RISK FETUS: NORMAL %

## 2024-02-14 ENCOUNTER — ROUTINE PRENATAL (OUTPATIENT)
Dept: OBGYN CLINIC | Facility: CLINIC | Age: 29
End: 2024-02-14

## 2024-02-14 VITALS — SYSTOLIC BLOOD PRESSURE: 120 MMHG | DIASTOLIC BLOOD PRESSURE: 76 MMHG | BODY MASS INDEX: 37.61 KG/M2 | WEIGHT: 226 LBS

## 2024-02-14 DIAGNOSIS — O26.892 HEARTBURN DURING PREGNANCY IN SECOND TRIMESTER: ICD-10-CM

## 2024-02-14 DIAGNOSIS — Z34.92 PRENATAL CARE IN SECOND TRIMESTER: ICD-10-CM

## 2024-02-14 DIAGNOSIS — R12 HEARTBURN DURING PREGNANCY IN SECOND TRIMESTER: ICD-10-CM

## 2024-02-14 DIAGNOSIS — Z3A.19 19 WEEKS GESTATION OF PREGNANCY: Primary | ICD-10-CM

## 2024-02-14 PROCEDURE — PNV: Performed by: STUDENT IN AN ORGANIZED HEALTH CARE EDUCATION/TRAINING PROGRAM

## 2024-02-14 RX ORDER — FAMOTIDINE 20 MG/1
20 TABLET, FILM COATED ORAL 2 TIMES DAILY
Qty: 60 TABLET | Refills: 0 | Status: SHIPPED | OUTPATIENT
Start: 2024-02-14

## 2024-02-14 NOTE — LETTER
February 14, 2024     Patient: Elisabet Oropeza  YOB: 1995  Date of Visit: 2/14/2024      To Whom it May Concern:    Elisabet Oropeza is under my professional care. Elisabet was seen in my office on 2/14/2024. Elisabet is pregnant and has an Estimated Date of Delivery: 7/4/24.    If you have any questions or concerns, please don't hesitate to call.         Sincerely,          Sangeeta Pritchett MD

## 2024-02-14 NOTE — PROGRESS NOTES
Elisabet is a 28 y.o.  19w6d. Reports ++FM, no LOF, VB, or regular contractions.     Sometimes pins and needles sensation over left leg after sleeping on left side  No loss of movement or pain    Some episodes of palpitations, no chest pain or shortness of breath  S/p cards referral for palpitations--plan for holter    Vitals:    24 1600   BP: 120/76     S=D  +FHTs    A/P:  cfDNA wnl  AFP ordered  Rh status POS  Level 2 scheduled 24    Discussed pre-term labor precautions  Return to office in 4 weeks

## 2024-02-20 ENCOUNTER — ROUTINE PRENATAL (OUTPATIENT)
Facility: HOSPITAL | Age: 29
End: 2024-02-20
Attending: OBSTETRICS & GYNECOLOGY
Payer: COMMERCIAL

## 2024-02-20 VITALS
DIASTOLIC BLOOD PRESSURE: 62 MMHG | BODY MASS INDEX: 36.92 KG/M2 | SYSTOLIC BLOOD PRESSURE: 120 MMHG | HEIGHT: 65 IN | HEART RATE: 106 BPM | WEIGHT: 221.6 LBS

## 2024-02-20 DIAGNOSIS — O99.211 OBESITY AFFECTING PREGNANCY IN FIRST TRIMESTER, UNSPECIFIED OBESITY TYPE: ICD-10-CM

## 2024-02-20 DIAGNOSIS — Z36.86 ENCOUNTER FOR ANTENATAL SCREENING FOR CERVICAL LENGTH: ICD-10-CM

## 2024-02-20 DIAGNOSIS — Z36.89 ENCOUNTER FOR FETAL ANATOMIC SURVEY: ICD-10-CM

## 2024-02-20 DIAGNOSIS — Z3A.20 20 WEEKS GESTATION OF PREGNANCY: Primary | ICD-10-CM

## 2024-02-20 DIAGNOSIS — O43.122 VELAMENTOUS INSERTION OF UMBILICAL CORD IN SECOND TRIMESTER: ICD-10-CM

## 2024-02-20 PROBLEM — Z34.91 PRENATAL CARE IN FIRST TRIMESTER: Status: RESOLVED | Noted: 2023-11-16 | Resolved: 2024-02-20

## 2024-02-20 PROBLEM — R03.0 ELEVATED BLOOD-PRESSURE READING WITHOUT DIAGNOSIS OF HYPERTENSION: Status: ACTIVE | Noted: 2024-02-20

## 2024-02-20 PROCEDURE — 76811 OB US DETAILED SNGL FETUS: CPT | Performed by: OBSTETRICS & GYNECOLOGY

## 2024-02-20 PROCEDURE — 76817 TRANSVAGINAL US OBSTETRIC: CPT | Performed by: OBSTETRICS & GYNECOLOGY

## 2024-02-20 PROCEDURE — 99214 OFFICE O/P EST MOD 30 MIN: CPT | Performed by: OBSTETRICS & GYNECOLOGY

## 2024-02-20 NOTE — PROGRESS NOTES
"[facility]: Elisabet Oropeza was seen today at 20w5d for anatomic survey and cervical length screening ultrasound.  See ultrasound report under \"OB Procedures\" tab.  Please don't hesitate to contact our office with any concerns or questions.  -Ramonita Deutsch MD      "

## 2024-02-20 NOTE — LETTER
"2024     Suha Irwin MD  [address] [facility] [address]    Patient: Elisabet Oropeza   YOB: 1995   Date of Visit: 2024       Dear Dr. Irwin:    Thank you for referring Elisabet Oropeza to me for evaluation. Below are my notes for this consultation.    If you have questions, please do not hesitate to call me. I look forward to following your patient along with you.         Sincerely,        Ramonita Deutsch MD        CC: No Recipients    Ramonita Deutsch MD  2024  9:23 AM  Sign when Signing Visit  [facility]: Elisabet Oropeza was seen today at 20w5d for anatomic survey and cervical length screening ultrasound.  See ultrasound report under \"OB Procedures\" tab.  Please don't hesitate to contact our office with any concerns or questions.  -Ramonita Deutsch MD         "

## 2024-02-20 NOTE — PROGRESS NOTES
Ultrasound Probe Disinfection    A transvaginal ultrasound was performed.   Prior to use, disinfection was performed with High Level Disinfection Process ([employer]en [employer]).  Probe serial number [id number]: [id number] was used.      Maddi Angelo  02/20/24  7:53 AM

## 2024-02-22 ENCOUNTER — HOSPITAL ENCOUNTER (OUTPATIENT)
Dept: NON INVASIVE DIAGNOSTICS | Facility: CLINIC | Age: 29
Discharge: HOME | End: 2024-02-22
Payer: COMMERCIAL

## 2024-02-22 DIAGNOSIS — R00.2 PALPITATIONS: ICD-10-CM

## 2024-02-22 PROCEDURE — 93225 XTRNL ECG REC<48 HRS REC: CPT

## 2024-02-22 PROCEDURE — 93226 XTRNL ECG REC<48 HR SCAN A/R: CPT

## 2024-03-06 ENCOUNTER — NURSE TRIAGE (OUTPATIENT)
Age: 29
End: 2024-03-06

## 2024-03-06 NOTE — TELEPHONE ENCOUNTER
"Patient's message:     The last time i felt a good movement was Monday night. I didn’t feel her at all yesterday during the day but I think I might’ve felt her slightly last night once changing sides. I know I have an anterior placenta so I’m not sure if maybe she just moved and that’s why I’m not really feeling her anymore. I had a little caffeine this morning and water and just had some chocolate pudding and I maybe felt her a little but not like I was so I’m not sure if it was her or gas. I did notice my belly was bloated last night but it seems back to normal now. I get cramps here and there but I have had that consistently throughout my pregnancy. Last week I think I experienced a possible [place] reyna kind of contraction that subsided after about a minute I think but this is my first pregnancy so I’m not sure. Sunday into Monday I had a radiating back pain but that has subsided and I also had what I thought was round ligament pain but that also has subsided but no changes in discharge or blood!       Triaged patient.  Advised patient to lay on left side and drink a couple glasses of cold water and count for about an hour to see if she feels any baby movements. Informed patient that due to gestational age, she is on the cusp of having inconsistent/consistent +FM everyday.      Nik texted Dr. Irwin on call provider today.  Agreed with above recommendations for patient just to monitor.     Patient made aware and she verbalized understanding.      Reason for Disposition   Pregnant 20 to 22 weeks and has felt baby move in past 8 hours    Answer Assessment - Initial Assessment Questions  1. FETAL MOVEMENT: \"Has the baby's movement decreased or changed significantly from normal?\" (e.g., yes, no; describe) \"When was the last time you felt the baby move?\" (e.g., minutes, hours)      Last night   2. CLAUDIA: \"What date are you expecting to deliver?\"       7/4/24  3. PREGNANCY: \"How many weeks pregnant are you?\"       " "22w6d  4. OTHER SYMPTOMS: \"Do you have any other symptoms?\" (e.g., abdominal pain, fever, leaking fluid from vagina, vaginal bleeding, widespread itching, etc.)      Denies    Protocols used: Pregnancy - Decreased or Abnormal Fetal Movement-ADULT-[place]    "

## 2024-03-13 ENCOUNTER — OFFICE VISIT (OUTPATIENT)
Dept: FAMILY MEDICINE CLINIC | Facility: CLINIC | Age: 29
End: 2024-03-13
Payer: COMMERCIAL

## 2024-03-13 VITALS
DIASTOLIC BLOOD PRESSURE: 78 MMHG | HEIGHT: 65 IN | SYSTOLIC BLOOD PRESSURE: 124 MMHG | BODY MASS INDEX: 37.49 KG/M2 | OXYGEN SATURATION: 99 % | WEIGHT: 225 LBS | TEMPERATURE: 97.2 F | HEART RATE: 100 BPM

## 2024-03-13 DIAGNOSIS — H65.92 LEFT NON-SUPPURATIVE OTITIS MEDIA: Primary | ICD-10-CM

## 2024-03-13 PROCEDURE — 99214 OFFICE O/P EST MOD 30 MIN: CPT | Performed by: NURSE PRACTITIONER

## 2024-03-13 RX ORDER — CIPROFLOXACIN AND DEXAMETHASONE 3; 1 MG/ML; MG/ML
4 SUSPENSION/ DROPS AURICULAR (OTIC) 2 TIMES DAILY
Qty: 7.5 ML | Refills: 0 | Status: SHIPPED | OUTPATIENT
Start: 2024-03-13 | End: 2024-03-18

## 2024-03-13 NOTE — PROGRESS NOTES
"Assessment/Plan:     Diagnoses and all orders for this visit:    Left non-suppurative otitis media  -     ciprofloxacin-dexamethasone (CIPRODEX) otic suspension; Administer 4 drops into the left ear 2 (two) times a day for 5 days        Discussed with patient plan to treat with Ciprodex ear drops  Patient instructed to call if no improvement in 72 hours or symptoms worsen    Subjective:      Patient ID: Elisabet Oropeza is a 28 y.o. female.    28 y.o.female presenting with left ear pain, nasal congestion and scratchy throat for the past 4 days. She reports that she feels she is underwater with the clogged feeling in the left ear. She denies fever, chills, cough, shortness of breath, sinus pressure or headache. She is current 23 weeks pregnant and understands the medication available to treat.      The following portions of the patient's history were reviewed and updated as appropriate: allergies, current medications, past family history, past medical history, past social history, past surgical history, and problem list.    Review of Systems   Constitutional:  Negative for chills, fatigue and fever.   HENT:  Positive for congestion, ear pain and sore throat. Negative for postnasal drip, rhinorrhea, sinus pressure and sinus pain.    Respiratory: Negative.  Negative for cough, chest tightness and shortness of breath.    Cardiovascular: Negative.    Gastrointestinal: Negative.    Musculoskeletal: Negative.  Negative for myalgias.   Neurological: Negative.  Negative for dizziness, light-headedness and headaches.   Psychiatric/Behavioral: Negative.         Objective:    /78 (BP Location: Right arm, Patient Position: Sitting, Cuff Size: Large)   Pulse 100   Temp (!) 97.2 °F (36.2 °C)   Ht 5' 5\" (1.651 m)   Wt 102 kg (225 lb)   LMP 08/31/2023 (Approximate)   SpO2 99%   BMI 37.44 kg/m² (Reviewed)     Physical Exam  Vitals reviewed.   Constitutional:       General: She is not in acute distress.     Appearance: " She is well-developed and well-groomed. She is not ill-appearing.   HENT:      Head: Normocephalic and atraumatic.      Right Ear: Tympanic membrane, ear canal and external ear normal.      Left Ear: Ear canal and external ear normal. A middle ear effusion is present.      Nose: Congestion present.      Mouth/Throat:      Mouth: Mucous membranes are moist.      Pharynx: Oropharynx is clear. Posterior oropharyngeal erythema present.   Eyes:      General: Lids are normal.      Extraocular Movements: Extraocular movements intact.      Conjunctiva/sclera: Conjunctivae normal.      Pupils: Pupils are equal, round, and reactive to light.   Neck:      Trachea: Trachea and phonation normal.   Cardiovascular:      Rate and Rhythm: Normal rate and regular rhythm.      Heart sounds: Normal heart sounds.   Pulmonary:      Effort: Pulmonary effort is normal.      Breath sounds: Normal breath sounds.   Musculoskeletal:      Cervical back: Neck supple.   Lymphadenopathy:      Cervical: No cervical adenopathy.   Skin:     General: Skin is warm and dry.      Capillary Refill: Capillary refill takes less than 2 seconds.   Neurological:      General: No focal deficit present.      Mental Status: She is alert and oriented to person, place, and time.   Psychiatric:         Mood and Affect: Mood normal.         Behavior: Behavior normal. Behavior is cooperative.

## 2024-03-13 NOTE — LETTER
March 13, 2024     Patient: Elisabet Oropeza   YOB: 1995   Date of Visit: 3/13/2024       To Whom it May Concern:    Elisabet Oropeza is under my professional care and was seen in the office on 3/13/2024.Please excuse her absence from work on 03/12/2024 and 03/13/2024.  She may return to work on 03/14/2024 .    If you have any questions or concerns, please don't hesitate to call.         Sincerely,          IRAIS Dorsey        CC: No Recipients

## 2024-03-14 ENCOUNTER — ROUTINE PRENATAL (OUTPATIENT)
Dept: OBGYN CLINIC | Facility: CLINIC | Age: 29
End: 2024-03-14

## 2024-03-14 VITALS — WEIGHT: 225 LBS | SYSTOLIC BLOOD PRESSURE: 120 MMHG | BODY MASS INDEX: 37.44 KG/M2 | DIASTOLIC BLOOD PRESSURE: 64 MMHG

## 2024-03-14 DIAGNOSIS — Z34.92 PRENATAL CARE IN SECOND TRIMESTER: Primary | ICD-10-CM

## 2024-03-14 PROCEDURE — PNV: Performed by: OBSTETRICS & GYNECOLOGY

## 2024-03-14 NOTE — PROGRESS NOTES
Patient reports good fm, no n/v, headache, cramping, bleeding, loss of fluid, dom violence, or smoking.  nancy pnv reviewed  center ultrasound and follow-up.  Patient has occasional palpitations for she thought it may be the aspirin but she stopped that and it did not improve so she has resumed her aspirin.  Asked patient to let us know if there increased palpitations so we can add TFT to her 28-week labs.  Patient is traveling to [place] reviewed precautions return in 4 weeks or sooner as needed.  Some yellow discharge noted by patient sometimes darker sometimes lighter no odor or itching.  Speculum exam shows normal prenatal discharge without erythema or signs of infection.

## 2024-03-17 ENCOUNTER — NURSE TRIAGE (OUTPATIENT)
Dept: OTHER | Facility: OTHER | Age: 29
End: 2024-03-17

## 2024-03-17 NOTE — TELEPHONE ENCOUNTER
"Answer Assessment - Initial Assessment Questions  1. NAME of MEDICATION: \"What medicine are you calling about?\"      benadryl 50 mg at 2300 and Buspar 5 mg 2100 on 3/17   2. QUESTION: \"What is your question?\" (e.g., medication refill, side effect)      Feeling   3. PRESCRIBING HCP: \"Who prescribed it?\" Reason: if prescribed by specialist, call should be referred to that group.      OTC, and PCP   4. SYMPTOMS: \"Do you have any symptoms?\"     Drowsy after medication   5. SEVERITY: If symptoms are present, ask \"Are they mild, moderate or severe?\"     mild  6. PREGNANCY:  \"Is there any chance that you are pregnant?\" \"When was your last menstrual period?\"      24W3D  CLAUDIA: 24      Baseline FM    Protocols used: Medication Question Call-ADULT-    "

## 2024-03-17 NOTE — TELEPHONE ENCOUNTER
"Regardin weeks/ medication concern  ----- Message from Troy Campos sent at 3/17/2024  3:41 AM EDT -----  \"  I am 24 weeks and I'm very drowsy from  taking benadryl and I'm nervous because I also take busiprone and michaelle if its too much.\"    "

## 2024-03-17 NOTE — TELEPHONE ENCOUNTER
Patient calling due to taking Benadryl at 2300, after taking Buspar at 2100. Mild dizziness experienced tonight, however resolved. Alert and oriented. Denies symptoms of excessive sedation. Baseline FM at this time. Patient to continue to monitor FM. Education provided on medication frequency. No additional symptoms reported. Care advice given. Informed to call back if worsening/developing symptoms. Verbalized understanding. Agreeable with disposition. No further questions.

## 2024-03-21 ENCOUNTER — OFFICE VISIT (OUTPATIENT)
Dept: URGENT CARE | Facility: MEDICAL CENTER | Age: 29
End: 2024-03-21
Payer: COMMERCIAL

## 2024-03-21 VITALS
DIASTOLIC BLOOD PRESSURE: 77 MMHG | BODY MASS INDEX: 38.44 KG/M2 | RESPIRATION RATE: 18 BRPM | WEIGHT: 231 LBS | OXYGEN SATURATION: 96 % | TEMPERATURE: 98.3 F | HEART RATE: 98 BPM | SYSTOLIC BLOOD PRESSURE: 135 MMHG

## 2024-03-21 DIAGNOSIS — H69.92 DYSFUNCTION OF LEFT EUSTACHIAN TUBE: Primary | ICD-10-CM

## 2024-03-21 PROCEDURE — 99213 OFFICE O/P EST LOW 20 MIN: CPT | Performed by: PHYSICIAN ASSISTANT

## 2024-03-21 NOTE — PROGRESS NOTES
"  [facility]        NAME: Elisabet Oropeza is a 28 y.o. female  : 1995    MRN: [id number]  DATE: 2024  TIME: 4:58 PM    Assessment and Plan   Dysfunction of left eustachian tube [H69.92]  1. Dysfunction of left eustachian tube              Patient Instructions      Increase fluids  2.    Use Flonase 2 sprays each nostril daily  3.    Tylenol as needed for discomfort  4.    Follow-up with PCP if symptoms persist      If tests have been performed at [facility], our office will contact you with results if changes need to be made to the care plan discussed with you at the visit.  You can review your full results on [facility].    Chief Complaint     Chief Complaint   Patient presents with    Sinus Problem     Pt. With sinus pressure, post nasal drip, cough, left ear pain that began 8 days ago. No fevers. Was started on ear drops last week for her ear. Pt. Is 25 weeks pregnant         History of Present Illness       Elisabet is a 28-year-old female who presents with a 1 week history of left-sided ear fullness, decreased hearing and pressure.  Patient reports she started with \"cold symptoms\" approximately 2 weeks prior.  She developed left-sided ear discomfort and was started on eardrops by her primary care physician.  Patient reports she was feeling better but traveled recently to [place] and upon returning noticed increased pressure in her left ear.  She denies any new fever or ear drainage.  Patient is 25 weeks pregnant.  She has allergies to penicillins.    Sinus Problem  Associated symptoms include congestion and ear pain.       Review of Systems   Review of Systems   Constitutional: Negative.    HENT:  Positive for congestion and ear pain. Negative for ear discharge.    Respiratory: Negative.     Gastrointestinal: Negative.          Current Medications       Current Outpatient Medications:     aspirin (ECOTRIN LOW STRENGTH) 81 mg EC tablet, Take 81 mg by mouth daily Only taking 1 " right now, Disp: , Rfl:     busPIRone (BUSPAR) 5 mg tablet, 1 & 1/2 TABLETS TWICE DAILY, Disp: 90 tablet, Rfl: 5    famotidine (PEPCID) 20 mg tablet, Take 1 tablet (20 mg total) by mouth 2 (two) times a day, Disp: 60 tablet, Rfl: 0    Prenatal Vit-Fe Fumarate-FA (PRENATAL PO), Take by mouth, Disp: , Rfl:     ciprofloxacin-dexamethasone (CIPRODEX) otic suspension, Administer 4 drops into the left ear 2 (two) times a day for 5 days (Patient not taking: Reported on 3/21/2024), Disp: 7.5 mL, Rfl: 0    Current Allergies     Allergies as of 03/21/2024 - Reviewed 03/21/2024   Allergen Reaction Noted    Augmentin [amoxicillin-pot clavulanate] Hives 11/22/2015            The following portions of the patient's history were reviewed and updated as appropriate: allergies, current medications, past family history, past medical history, past social history, past surgical history and problem list.     Past Medical History:   Diagnosis Date    GERD (gastroesophageal reflux disease)     Headache(784.0)     Stress headaches    Ovarian cyst     last assessed 06/24/2014       Past Surgical History:   Procedure Laterality Date    WISDOM TOOTH EXTRACTION  2018       Family History   Problem Relation Age of Onset    Gallbladder disease Mother     Migraines Mother     Hypertension Father     Asthma Father     Endometriosis Sister         Partial Hyst    Ulcerative colitis Maternal Grandmother     Other Maternal Grandfather         cardiac disorder         Medications have been verified.        Objective   /77   Pulse 98   Temp 98.3 °F (36.8 °C)   Resp 18   Wt 105 kg (231 lb)   LMP 08/31/2023 (Approximate)   SpO2 96%   BMI 38.44 kg/m²   Patient's last menstrual period was 08/31/2023 (approximate).       Physical Exam     Physical Exam  Constitutional:       General: She is not in acute distress.     Appearance: Normal appearance. She is not ill-appearing.   HENT:      Head: Normocephalic and atraumatic.      Right Ear:  Tympanic membrane and ear canal normal.      Left Ear: A middle ear effusion is present. Tympanic membrane is not injected.      Nose: Mucosal edema and congestion present. No rhinorrhea.      Mouth/Throat:      Lips: Pink.      Pharynx: Oropharynx is clear.   Cardiovascular:      Rate and Rhythm: Normal rate and regular rhythm.      Heart sounds: Normal heart sounds, S1 normal and S2 normal. No murmur heard.  Pulmonary:      Effort: Pulmonary effort is normal.      Breath sounds: Normal breath sounds and air entry.   Neurological:      Mental Status: She is alert.

## 2024-03-21 NOTE — PATIENT INSTRUCTIONS
Increase fluids  2.    Use Flonase 2 sprays each nostril daily  3.    Tylenol as needed for discomfort  4.    Follow-up with PCP if symptoms persist

## 2024-03-27 ENCOUNTER — NURSE TRIAGE (OUTPATIENT)
Age: 29
End: 2024-03-27

## 2024-03-27 ENCOUNTER — HOSPITAL ENCOUNTER (OUTPATIENT)
Facility: HOSPITAL | Age: 29
Discharge: HOME | End: 2024-03-27
Attending: STUDENT IN AN ORGANIZED HEALTH CARE EDUCATION/TRAINING PROGRAM | Admitting: STUDENT IN AN ORGANIZED HEALTH CARE EDUCATION/TRAINING PROGRAM
Payer: COMMERCIAL

## 2024-03-27 VITALS
BODY MASS INDEX: 37.49 KG/M2 | RESPIRATION RATE: 18 BRPM | HEART RATE: 102 BPM | WEIGHT: 225 LBS | TEMPERATURE: 97.9 F | SYSTOLIC BLOOD PRESSURE: 120 MMHG | OXYGEN SATURATION: 97 % | HEIGHT: 65 IN | DIASTOLIC BLOOD PRESSURE: 71 MMHG

## 2024-03-27 DIAGNOSIS — R12 HEARTBURN DURING PREGNANCY IN SECOND TRIMESTER: ICD-10-CM

## 2024-03-27 DIAGNOSIS — O26.892 HEARTBURN DURING PREGNANCY IN SECOND TRIMESTER: ICD-10-CM

## 2024-03-27 PROBLEM — O36.8190 DECREASED FETAL MOVEMENT AFFECTING MANAGEMENT OF MOTHER, ANTEPARTUM: Status: ACTIVE | Noted: 2024-03-27

## 2024-03-27 PROCEDURE — 76815 OB US LIMITED FETUS(S): CPT | Performed by: PHYSICIAN ASSISTANT

## 2024-03-27 PROCEDURE — 99213 OFFICE O/P EST LOW 20 MIN: CPT | Performed by: PHYSICIAN ASSISTANT

## 2024-03-27 PROCEDURE — 99213 OFFICE O/P EST LOW 20 MIN: CPT

## 2024-03-27 RX ORDER — FAMOTIDINE 20 MG/1
20 TABLET, FILM COATED ORAL 2 TIMES DAILY
Qty: 180 TABLET | Refills: 1 | Status: SHIPPED | OUTPATIENT
Start: 2024-03-27

## 2024-03-27 NOTE — TELEPHONE ENCOUNTER
"Patient 25w4d called stating she has not felt baby move since 0400. States she tried to eating and drinking. Tried drinking water and Gatorade. States baby is usually very active in the AM. Pt states she is feeling intermittent cramping. Pt c/o congestion and cough. Denies LOF,VB. Advised patient to go to [facility] for evaluation. Tiger Text Dr Hatch and [facility] charge.      Reason for Disposition   [1] Baby moving less today (e.g., kick count < 5 in 1 hour or < 10 in 2 hours) AND [2] pregnant 23 or more weeks    Answer Assessment - Initial Assessment Questions  1. LOCATION: \"Where does it hurt?\"       Cramping this AM  2. RADIATION: \"Does the pain shoot anywhere else?\" (e.g., chest, back)      *No Answer*  3. ONSET: \"When did the pain begin?\" (Minutes, hours or days ago)       930  4. ONSET: \"Gradual or sudden onset?\"      *No Answer*  5. PATTERN: \"Does the pain come and go, or has it been constant since it started?\"       *No Answer*  6. SEVERITY: \"How bad is the pain?\" \"What does it keep you from doing?\"  (e.g., Scale 1-10; mild, moderate, or severe)    - MILD (1-3): doesn't interfere with normal activities, abdomen soft and not tender to touch     - MODERATE (4-7): interferes with normal activities or awakens from sleep, tender to touch     - SEVERE (8-10): excruciating pain, doubled over, unable to do any normal activities      *No Answer*  7. RECURRENT SYMPTOM: \"Have you ever had this type of stomach pain before?\" If Yes, ask: \"When was the last time?\" and \"What happened that time?\"       *No Answer*  8. CAUSE: \"What do you think is causing the stomach pain?      *No Answer*  9. RELIEVING/AGGRAVATING FACTORS: \"What makes it better or worse?\" (e.g., antacids, bowel movement, movement)      *No Answer*  10. FETAL MOVEMENT: \"Has the baby's movement decreased or changed significantly from normal?\"        *No Answer*  11. OTHER SYMPTOMS: \"Has there been any vaginal bleeding, fever, vomiting, diarrhea, or " "urine problems?\"        *No Answer*  12. CLAUDIA: \"What date are you expecting to deliver?\"        *No Answer*    Protocols used: Pregnancy - Abdominal Pain Greater Than 20 Weeks EGA-ADULT-    "

## 2024-03-27 NOTE — PROGRESS NOTES
"L&D Triage Note - OB/GYN  Elisabet Oropeza 28 y.o. female MRN: [id number]  Unit/Bed#: LD TRIAGE  Encounter: [id number]      ASSESSMENT/PLAN  Elisabet Oropeza is a 28 y.o.  at 25w6d for absence of FM this am.       1) Decreased FM  - NST noted to overall be reactive and reassuring  - FRANCI done= 17.5 cm        2)  Discharge instructions  - Patient instructed to call if experiencing worsening contractions, vaginal bleeding, loss of fluid or decreased fetal movement.  - Will follow up with OBGYN in office at next scheduled visit      She is a patient of [facility]   D/w Dr. Clifton, on call OBGYN Attending Physician  ______________    SUBJECTIVE    CLAUDIA: Estimated Date of Delivery: 24    HPI:  28 y.o.  25w6d presents with complaint of decreased FM. She notes that she has had some sharp abdominal cramping as well. She notes she has had an URI w increased coughing over the last few weeks as well. Unsure if cramping more in relation to cough.     Contractions: one ctxn noted  Leakage of fluid: denies   Vaginal Bleeding: none  Fetal movement: present-now noted    Her obstetrical history is significant for decreased FM with velamentous vs margincal cord insertion    ROS:  Constitutional: Negative  Respiratory: Negative  Cardiovascular: Negative    Gastrointestinal: Negative    Physical Exam  General: Well appearing, no distress  Respiratory: Unlabored breathing  Cardiovascular: Regular rate  Abdomen: Soft, gravid, nontender     Fundal Height: Appropriate for gestational age.  Extremities: Warm and well perfused.  Non tender.      OBJECTIVE:  /71 (BP Location: Right arm)   Pulse 102   Temp 97.9 °F (36.6 °C) (Oral)   Resp 18   Ht 5' 5\" (1.651 m)   Wt 102 kg (225 lb)   LMP 2023 (Approximate)   SpO2 97%   BMI 37.44 kg/m²   Body mass index is 37.44 kg/m².  Labs: No results found for this or any previous visit (from the past 24 hour(s)).      SVE: deferred       FHT:150 " "bpm  Baseline Rate (FHR): 145 bpm  Variability: Moderate  Accelerations: 15 x 15 or greater  Decelerations: Variable, Memo not <80 bpm, For < 60 seconds    TOCO: no UA noted  Contraction Frequency (minutes): occasional  Contraction Duration (seconds): 60  Contraction Intensity: Mild              TAUS   FRANCI      - Q1 3.1cm     - Q2 4.7cm     - Q3 4.7cm     - Q4 5.0cm     - Total: 17.5cm   Placenta: anterior    Presentation: brittanie Bullard PA-C  OB/GYN   3/27/2024  12:51 PM      Portions of the record may have been created with voice recognition software.  Occasional wrong word or \"sound a like\" substitutions may have occurred due to the inherent limitations of voice recognition software.  Read the chart carefully and recognize, using context, where substitutions have occurred\"  "

## 2024-04-05 ENCOUNTER — TELEPHONE (OUTPATIENT)
Age: 29
End: 2024-04-05

## 2024-04-05 ENCOUNTER — APPOINTMENT (OUTPATIENT)
Dept: LAB | Facility: MEDICAL CENTER | Age: 29
End: 2024-04-05
Payer: COMMERCIAL

## 2024-04-05 ENCOUNTER — OFFICE VISIT (OUTPATIENT)
Dept: FAMILY MEDICINE CLINIC | Facility: CLINIC | Age: 29
End: 2024-04-05
Payer: COMMERCIAL

## 2024-04-05 VITALS
TEMPERATURE: 97.7 F | SYSTOLIC BLOOD PRESSURE: 118 MMHG | DIASTOLIC BLOOD PRESSURE: 80 MMHG | HEART RATE: 94 BPM | OXYGEN SATURATION: 98 % | HEIGHT: 65 IN | WEIGHT: 231 LBS | BODY MASS INDEX: 38.49 KG/M2

## 2024-04-05 DIAGNOSIS — R39.198 DIFFICULTY URINATING: Primary | ICD-10-CM

## 2024-04-05 DIAGNOSIS — F41.1 GAD (GENERALIZED ANXIETY DISORDER): Primary | ICD-10-CM

## 2024-04-05 DIAGNOSIS — R39.198 DIFFICULTY URINATING: ICD-10-CM

## 2024-04-05 DIAGNOSIS — Z3A.27 27 WEEKS GESTATION OF PREGNANCY: ICD-10-CM

## 2024-04-05 DIAGNOSIS — R35.0 URINARY FREQUENCY: Primary | ICD-10-CM

## 2024-04-05 DIAGNOSIS — R03.0 ELEVATED BLOOD-PRESSURE READING WITHOUT DIAGNOSIS OF HYPERTENSION: ICD-10-CM

## 2024-04-05 LAB
AMORPH URATE CRY URNS QL MICRO: ABNORMAL
BACTERIA UR QL AUTO: ABNORMAL /HPF
BILIRUB UR QL STRIP: NEGATIVE
CLARITY UR: CLEAR
COLOR UR: COLORLESS
GLUCOSE UR STRIP-MCNC: NEGATIVE MG/DL
HGB UR QL STRIP.AUTO: NEGATIVE
KETONES UR STRIP-MCNC: NEGATIVE MG/DL
LEUKOCYTE ESTERASE UR QL STRIP: NEGATIVE
MUCOUS THREADS UR QL AUTO: ABNORMAL
NITRITE UR QL STRIP: NEGATIVE
NON-SQ EPI CELLS URNS QL MICRO: ABNORMAL /HPF
PH UR STRIP.AUTO: 6.5 [PH]
PROT UR STRIP-MCNC: NEGATIVE MG/DL
RBC #/AREA URNS AUTO: ABNORMAL /HPF
SP GR UR STRIP.AUTO: 1.01 (ref 1–1.03)
UROBILINOGEN UR STRIP-ACNC: <2 MG/DL
WBC #/AREA URNS AUTO: ABNORMAL /HPF

## 2024-04-05 PROCEDURE — 81001 URINALYSIS AUTO W/SCOPE: CPT

## 2024-04-05 PROCEDURE — 87086 URINE CULTURE/COLONY COUNT: CPT

## 2024-04-05 PROCEDURE — 99214 OFFICE O/P EST MOD 30 MIN: CPT | Performed by: FAMILY MEDICINE

## 2024-04-05 NOTE — TELEPHONE ENCOUNTER
----- Message from Elisabet Oropeza sent at 4/5/2024 12:17 PM EDT -----  Regarding: Pregnancy Essentials Guide  Contact: [phone]  I feel like I have cramping down low like where my bladder is. I feel like I have to pee really bad even after going and not really going that much. I do not have a fever. I do not have burning.

## 2024-04-05 NOTE — PROGRESS NOTES
Name: Elisabet Oropeza      : 1995      MRN: [id number]  Encounter Provider: Lamberto Bhatt MD  Encounter Date: 2024   Encounter department: [facility]    Assessment & Plan     1. TONEY (generalized anxiety disorder)  Assessment & Plan:  I reviewed with pt. TONEY-7 = 8.  REC: increase Buspar to 10mg bid. Discussed stress reduction. Recheck 4w if not improving    Orders:  -     busPIRone (BUSPAR) 5 mg tablet; 2 TABLETS TWICE DAILY    2. Elevated blood-pressure reading without diagnosis of hypertension  Assessment & Plan:  BP at goal today. Continue to monitor. Has appt in 6w for yearly PE - will recheck BP then.            Subjective     f/u multiple med issues  - pt states that her anxiety is a little labile. She has been compliant with buspirone 7.5mg bid. She denies depression. She is presently finishing her second trimester. TONEY done  - BP has been a little labile during her pregnancy and has been monitored carefully by her OB.  Pt denies CP,  palp, lightheadedness, worsening SOB or other symptoms  - no other new concerns      Review of Systems   Constitutional: Negative.    HENT: Negative.     Eyes: Negative.    Respiratory: Negative.     Cardiovascular: Negative.    Skin: Negative.    Neurological: Negative.    Psychiatric/Behavioral:  Negative for dysphoric mood, self-injury and suicidal ideas. The patient is nervous/anxious.        Past Medical History:   Diagnosis Date   • GERD (gastroesophageal reflux disease)    • Headache(784.0)     Stress headaches   • Ovarian cyst     last assessed 2014     Past Surgical History:   Procedure Laterality Date   • WISDOM TOOTH EXTRACTION  2018     Family History   Problem Relation Age of Onset   • Gallbladder disease Mother    • Migraines Mother    • Hypertension Father    • Asthma Father    • Endometriosis Sister         Partial Hyst   • Ulcerative colitis Maternal Grandmother    • Other Maternal Grandfather          cardiac disorder     Social History     Socioeconomic History   • Marital status: Single     Spouse name: None   • Number of children: None   • Years of education: None   • Highest education level: None   Occupational History     Employer: [employer]   Tobacco Use   • Smoking status: Never     Passive exposure: Never   • Smokeless tobacco: Never   Vaping Use   • Vaping status: Never Used   Substance and Sexual Activity   • Alcohol use: Not Currently     Alcohol/week: 2.0 standard drinks of alcohol     Types: 2 Glasses of wine per week     Comment: social, not at the moment, recovering from preg   • Drug use: Never   • Sexual activity: Yes     Partners: Male   Other Topics Concern   • None   Social History Narrative    Daily caffeine consumption, 1 serving per day    Exercises regularly     Social Determinants of Health     Financial Resource Strain: Not on file   Food Insecurity: No Food Insecurity (4/4/2024)    Hunger Vital Sign    • Worried About Running Out of Food in the Last Year: Never true    • Ran Out of Food in the Last Year: Never true   Transportation Needs: No Transportation Needs (4/4/2024)    PRAPARE - Transportation    • Lack of Transportation (Medical): No    • Lack of Transportation (Non-Medical): No   Physical Activity: Not on file   Stress: Not on file   Social Connections: Not on file   Intimate Partner Violence: Not on file   Housing Stability: Low Risk  (4/4/2024)    Housing Stability Vital Sign    • Unable to Pay for Housing in the Last Year: No    • Number of Places Lived in the Last Year: 1    • Unstable Housing in the Last Year: No     Current Outpatient Medications on File Prior to Visit   Medication Sig   • aspirin (ECOTRIN LOW STRENGTH) 81 mg EC tablet Take 81 mg by mouth daily Only taking 2 right now   • famotidine (PEPCID) 20 mg tablet TAKE 1 TABLET BY MOUTH TWICE A DAY   • Prenatal Vit-Fe Fumarate-FA (PRENATAL PO) Take by mouth   • [DISCONTINUED] busPIRone (BUSPAR) 5  "mg tablet 1 & 1/2 TABLETS TWICE DAILY     Allergies   Allergen Reactions   • Augmentin [Amoxicillin-Pot Clavulanate] Hives     Pt. Breaks up in hives     Immunization History   Administered Date(s) Administered   • COVID-19 PFIZER VACCINE 0.3 ML IM 03/27/2021, 04/17/2021, 11/20/2021   • DTP 1995, 1995, 1995, 12/11/1997   • DTaP 5 05/30/2001   • HPV Quadrivalent 01/07/2010, 03/10/2010, 07/13/2010   • Hep B, adult 1996, 1996, 03/11/1997   • Hib (PRP-OMP) 1996, 1996, 1996, 12/11/1997   • IPV 1995, 1996, 1996, 05/30/2001   • Influenza, injectable, quadrivalent, preservative free 0.5 mL 12/18/2023   • MMR 08/25/1997, 05/30/2001   • Meningococcal, Unknown Serogroups 05/29/2008, 06/11/2014   • Tdap 05/29/2008, 06/11/2014, 04/08/2024   • Tuberculin Skin Test-PPD Intradermal 05/06/1997   • Varicella 02/09/1998, 05/28/2008       Objective     /80   Pulse 94   Temp 97.7 °F (36.5 °C)   Ht 5' 5\" (1.651 m)   Wt 105 kg (231 lb)   LMP 08/31/2023 (Approximate)   SpO2 98%   BMI 38.44 kg/m²     Physical Exam  Vitals reviewed.   HENT:      Head: Normocephalic.   Cardiovascular:      Rate and Rhythm: Normal rate and regular rhythm.   Pulmonary:      Effort: Pulmonary effort is normal.      Breath sounds: No wheezing or rales.   Skin:     General: Skin is warm.      Capillary Refill: Capillary refill takes less than 2 seconds.   Neurological:      General: No focal deficit present.      Mental Status: She is alert and oriented to person, place, and time.   Psychiatric:         Behavior: Behavior normal.         Thought Content: Thought content normal.         Judgment: Judgment normal.      Comments: PHQ-2/9 Depression Screening    Little interest or pleasure in doing things: 0 - not at all  Feeling down, depressed, or hopeless: 0 - not at all  PHQ-2 Score: 0  PHQ-2 Interpretation: Negative depression screen     TONEY-7 Flowsheet Screening    Flowsheet Row Most " Recent Value   Over the last 2 weeks, how often have you been bothered by any of the   following problems?    Feeling nervous, anxious, or on edge 2   Not being able to stop or control worrying 2   Worrying too much about different things 0   Trouble relaxing 1   Being so restless that it is hard to sit still 0   Becoming easily annoyed or irritable 1   Feeling afraid as if something awful might happen 2   TONEY-7 Total Score 8             Lamberto Bhatt MD

## 2024-04-06 LAB — BACTERIA UR CULT: NORMAL

## 2024-04-07 NOTE — PROGRESS NOTES
OB/GYN  PN Visit  Elisabet Oropeza  [id number]  2024  7:47 AM  Elizabeth Bullard PA-C    S: 28 y.o.  27w3d here for PN visit. Pregnancy complicated by velamentous vs marginal cord insertion.     OB complaints:  Denies c/o n/v/ha, no edema, no smoking, no DV.   No vb/lof      She reports that overall she is feeling well.  She notes some cramping that seems to be worse when she is sitting in the same position longer term at work. Advised to get up stretch and trial of maternity support belt. We did review RLP vs s/sx PTL to watch for. Pt aware and will call if needed.   She also still notes periods of increased and decreased perceived activity in baby. We reviewed anterior placenta and FKCs. She will call if she is not getting appropriate FM.     O:    Pre- Vitals      Flowsheet Row Most Recent Value   Prenatal Assessment    Fetal Heart Rate 150   Fundal Height (cm) 29 cm   Movement Present   Prenatal Vitals    Blood Pressure 116/78   Weight - Scale 106 kg (234 lb 9.6 oz)   Urine Albumin/Glucose    Dilation/Effacement/Station    Vaginal Drainage    Draining Fluid No   Edema    LLE Edema None   RLE Edema None   Facial Edema None              Gen: no acute distress, nonlabored breathing.  OB exam completed: fundal height, +FHT.  Urine: -/-    A/P:  #1. 27w3d GESTATION  Labor precautions reviewed  Fetal kick counts reviewed  Tdap: will plan today  Rhogam: NA (O+)  Third Tri labs given.   Yellow packet given and consents to be returned signed.   Breast pump: did not order as pt is undecided on if she plans to breast feed or not. She is still considering options and will let us know if she wants us to order for her.   Pediatrician: referral placed      RTC in 2 weeks    Elizabeth Bullard PA-C  2024  7:47 AM

## 2024-04-08 ENCOUNTER — ROUTINE PRENATAL (OUTPATIENT)
Dept: OBGYN CLINIC | Facility: CLINIC | Age: 29
End: 2024-04-08
Payer: COMMERCIAL

## 2024-04-08 VITALS
WEIGHT: 234.6 LBS | BODY MASS INDEX: 39.09 KG/M2 | SYSTOLIC BLOOD PRESSURE: 116 MMHG | HEIGHT: 65 IN | DIASTOLIC BLOOD PRESSURE: 78 MMHG

## 2024-04-08 DIAGNOSIS — Z23 ENCOUNTER FOR IMMUNIZATION: ICD-10-CM

## 2024-04-08 DIAGNOSIS — Z34.92 SECOND TRIMESTER PREGNANCY: Primary | ICD-10-CM

## 2024-04-08 PROCEDURE — 90471 IMMUNIZATION ADMIN: CPT | Performed by: PHYSICIAN ASSISTANT

## 2024-04-08 PROCEDURE — PNV: Performed by: PHYSICIAN ASSISTANT

## 2024-04-08 PROCEDURE — 90715 TDAP VACCINE 7 YRS/> IM: CPT | Performed by: PHYSICIAN ASSISTANT

## 2024-04-10 RX ORDER — BUSPIRONE HYDROCHLORIDE 5 MG/1
TABLET ORAL
Qty: 90 TABLET | Refills: 5 | Status: SHIPPED | OUTPATIENT
Start: 2024-04-10

## 2024-04-10 NOTE — ASSESSMENT & PLAN NOTE
I reviewed with pt. TONEY-7 = 8.  REC: increase Buspar to 10mg bid. Discussed stress reduction. Recheck 4w if not improving

## 2024-04-13 ENCOUNTER — APPOINTMENT (OUTPATIENT)
Dept: LAB | Facility: MEDICAL CENTER | Age: 29
End: 2024-04-13
Payer: COMMERCIAL

## 2024-04-13 DIAGNOSIS — Z34.92 SECOND TRIMESTER PREGNANCY: ICD-10-CM

## 2024-04-13 LAB
BASOPHILS # BLD AUTO: 0.03 THOUSANDS/ÂΜL (ref 0–0.1)
BASOPHILS NFR BLD AUTO: 0 % (ref 0–1)
EOSINOPHIL # BLD AUTO: 0.08 THOUSAND/ÂΜL (ref 0–0.61)
EOSINOPHIL NFR BLD AUTO: 1 % (ref 0–6)
ERYTHROCYTE [DISTWIDTH] IN BLOOD BY AUTOMATED COUNT: 15.4 % (ref 11.6–15.1)
HCT VFR BLD AUTO: 33.8 % (ref 34.8–46.1)
HGB BLD-MCNC: 10.9 G/DL (ref 11.5–15.4)
IMM GRANULOCYTES # BLD AUTO: 0.07 THOUSAND/UL (ref 0–0.2)
IMM GRANULOCYTES NFR BLD AUTO: 1 % (ref 0–2)
LYMPHOCYTES # BLD AUTO: 2.01 THOUSANDS/ÂΜL (ref 0.6–4.47)
LYMPHOCYTES NFR BLD AUTO: 19 % (ref 14–44)
MCH RBC QN AUTO: 31.7 PG (ref 26.8–34.3)
MCHC RBC AUTO-ENTMCNC: 32.2 G/DL (ref 31.4–37.4)
MCV RBC AUTO: 98 FL (ref 82–98)
MONOCYTES # BLD AUTO: 0.48 THOUSAND/ÂΜL (ref 0.17–1.22)
MONOCYTES NFR BLD AUTO: 5 % (ref 4–12)
NEUTROPHILS # BLD AUTO: 7.99 THOUSANDS/ÂΜL (ref 1.85–7.62)
NEUTS SEG NFR BLD AUTO: 74 % (ref 43–75)
NRBC BLD AUTO-RTO: 0 /100 WBCS
PLATELET # BLD AUTO: 220 THOUSANDS/UL (ref 149–390)
PMV BLD AUTO: 11.2 FL (ref 8.9–12.7)
RBC # BLD AUTO: 3.44 MILLION/UL (ref 3.81–5.12)
WBC # BLD AUTO: 10.66 THOUSAND/UL (ref 4.31–10.16)

## 2024-04-13 PROCEDURE — 86780 TREPONEMA PALLIDUM: CPT

## 2024-04-13 PROCEDURE — 82950 GLUCOSE TEST: CPT

## 2024-04-13 PROCEDURE — 85025 COMPLETE CBC W/AUTO DIFF WBC: CPT

## 2024-04-13 PROCEDURE — 36415 COLL VENOUS BLD VENIPUNCTURE: CPT

## 2024-04-14 LAB
GLUCOSE 1H P 50 G GLC PO SERPL-MCNC: 107 MG/DL (ref 70–134)
TREPONEMA PALLIDUM IGG+IGM AB [PRESENCE] IN SERUM OR PLASMA BY IMMUNOASSAY: NORMAL

## 2024-04-16 ENCOUNTER — NURSE TRIAGE (OUTPATIENT)
Age: 29
End: 2024-04-16

## 2024-04-16 NOTE — TELEPHONE ENCOUNTER
----- Message from Elisabet Oropeza sent at 4/16/2024  6:46 AM EDT -----  Regarding: Pregnancy Essentials Guide  Contact: [phone]  Good morning. Last night I went to pass gas and felt a bigger than usual gush come out, like more than my usual discharge so I went to the bathroom and all this was on my underwear/liner. Is that normal at this stage sometimes. It didn’t smell like pee so I don’t think it was that. This happened around 4 am and I haven’t had a gush like that since. The baby has been moving. I have felt her all morning. I am maybe a little bit crampy at times but it’s in my lower belly. When I walk around it seems to subside.  That’s the most I’ve ever had come out at once this far so just want to double check that’s normal.

## 2024-04-16 NOTE — TELEPHONE ENCOUNTER
RN followed up on patient's message from this morning - at 4am felt a gush of fluid. Noted clear fluid, denies odor. Since then, has not had additional gushes, and does not feel trickling of fluid. Patient had very mild cramping this morning which resolved. Normal fetal movement. Patient denies vaginal bleeding. RN advised patient should continue to monitor for additional gushes or leakage of fluid in the meanwhile. Call back if additional gushes occur, or new onset of painful cramping, or vaginal bleeding. Go to L&D if any of above occur overnight. RN will route message to provider to see if would like to have patient come in sooner than next appointment. Pt verbalized an understanding. No further questions at this time.

## 2024-04-16 NOTE — TELEPHONE ENCOUNTER
"Reason for Disposition  • Rupture of Membranes, questions about    Answer Assessment - Initial Assessment Questions  1. ONSET: \"When did the symptoms begin?\"         Last around 4am; Clear, no odor.   2. CONTRACTIONS: \"Are you having any contractions?\" If yes, ask: \"Describe the contractions that you are having.\" (e.g., duration, frequency, regularity, severity)     Mild Cramping this morning which has subsided  3. CLAUDIA: \"What date are you expecting to deliver?\"      7/4/24  4. PARITY: \"Have you had a baby before?\" If Yes, ask: \"How long did the labor last?\"      G1  5. FETAL MOVEMENT: \"Has the baby's movement decreased or changed significantly from normal?\"      Normal fetal movement this morning  6. OTHER SYMPTOMS: \"Do you have any other symptoms?\" (e.g., abdominal pain, vaginal bleeding, fever, hand/facial swelling)      Denies    Protocols used: Pregnancy - Rupture of Membranes-ADULT-[place]    "

## 2024-04-24 ENCOUNTER — VBI (OUTPATIENT)
Dept: ADMINISTRATIVE | Facility: OTHER | Age: 29
End: 2024-04-24

## 2024-04-30 NOTE — PROGRESS NOTES
VISIT 29 yr old  at 30w6d: (+) edema - right foot>left foot and hands; using compression stockings; (+) BHs - very irregular; Denies n/v/HA/vb/lof/dv/smoking; urine neg/neg  Labs up to date - mild anemia - start OTC iron + vit C; PNC - velamentous cord - follow-up growth/anatomy 32w; weekly NST/FRANCI at 36w   PNVs + DHA - tolerating daily  Good FM - r/leo 10 kicks/2 hrs  Tdap done 24    Breast pump - not needed - plans formula; Peds - referral placed prior; Contraception - undecided;  Yellow folder given and reviewed at prior visit; Delivery Care Consent reviewed and signed today  Encouraged hydration. Reviewed signs and symptoms of labor and preE and when to call  Encouraged infection prevention/handwashing.  RTO in 2 weeks for routine ob check or sooner if needed

## 2024-05-01 ENCOUNTER — ROUTINE PRENATAL (OUTPATIENT)
Dept: OBGYN CLINIC | Facility: CLINIC | Age: 29
End: 2024-05-01

## 2024-05-01 VITALS
HEIGHT: 65 IN | DIASTOLIC BLOOD PRESSURE: 74 MMHG | BODY MASS INDEX: 39.99 KG/M2 | WEIGHT: 240 LBS | SYSTOLIC BLOOD PRESSURE: 120 MMHG

## 2024-05-01 DIAGNOSIS — Z34.03 ENCOUNTER FOR SUPERVISION OF NORMAL FIRST PREGNANCY IN THIRD TRIMESTER: Primary | ICD-10-CM

## 2024-05-01 PROCEDURE — PNV: Performed by: PHYSICIAN ASSISTANT

## 2024-05-03 DIAGNOSIS — O26.892 HEARTBURN DURING PREGNANCY IN SECOND TRIMESTER: ICD-10-CM

## 2024-05-03 DIAGNOSIS — R12 HEARTBURN DURING PREGNANCY IN SECOND TRIMESTER: ICD-10-CM

## 2024-05-03 RX ORDER — FAMOTIDINE 20 MG/1
20 TABLET, FILM COATED ORAL 2 TIMES DAILY
Qty: 180 TABLET | Refills: 0 | Status: SHIPPED | OUTPATIENT
Start: 2024-05-03

## 2024-05-09 ENCOUNTER — ULTRASOUND (OUTPATIENT)
Facility: HOSPITAL | Age: 29
End: 2024-05-09
Payer: COMMERCIAL

## 2024-05-09 VITALS
HEART RATE: 98 BPM | BODY MASS INDEX: 40.35 KG/M2 | DIASTOLIC BLOOD PRESSURE: 74 MMHG | HEIGHT: 65 IN | SYSTOLIC BLOOD PRESSURE: 120 MMHG | WEIGHT: 242.2 LBS

## 2024-05-09 DIAGNOSIS — R03.0 ELEVATED BLOOD-PRESSURE READING WITHOUT DIAGNOSIS OF HYPERTENSION: Primary | ICD-10-CM

## 2024-05-09 DIAGNOSIS — O36.60X0 EXCESSIVE FETAL GROWTH, ANTEPARTUM, SINGLE OR UNSPECIFIED FETUS: ICD-10-CM

## 2024-05-09 DIAGNOSIS — Z3A.32 32 WEEKS GESTATION OF PREGNANCY: ICD-10-CM

## 2024-05-09 DIAGNOSIS — E66.01 SEVERE OBESITY DUE TO EXCESS CALORIES AFFECTING PREGNANCY IN THIRD TRIMESTER (HCC): ICD-10-CM

## 2024-05-09 DIAGNOSIS — O99.213 SEVERE OBESITY DUE TO EXCESS CALORIES AFFECTING PREGNANCY IN THIRD TRIMESTER (HCC): ICD-10-CM

## 2024-05-09 DIAGNOSIS — O43.123 VELAMENTOUS INSERTION OF UMBILICAL CORD IN THIRD TRIMESTER: ICD-10-CM

## 2024-05-09 PROCEDURE — 99214 OFFICE O/P EST MOD 30 MIN: CPT | Performed by: OBSTETRICS & GYNECOLOGY

## 2024-05-09 PROCEDURE — 76816 OB US FOLLOW-UP PER FETUS: CPT | Performed by: OBSTETRICS & GYNECOLOGY

## 2024-05-09 NOTE — LETTER
May 12, 2024     Antonieta Hatch MD  [address] [facility] [id number]    Patient: Elisabet Oropeza   YOB: 1995   Date of Visit: 5/9/2024       Dear Dr. Hatch:    Thank you for referring Elisabet Oropeza to me for evaluation. Below are my notes for this consultation.    If you have questions, please do not hesitate to call me. I look forward to following your patient along with you.         Sincerely,        Zan Sheppard MD        CC: No Recipients    Zan Sheppard MD  5/12/2024  4:56 PM  Sign when Signing Visit  A fetal ultrasound was completed. See Ob procedures in Epic for an interpretation and recommendations. Do not hesitate to contact us in [facility] if you have questions.    Zan Sheppard MD, MSCE  Maternal Fetal Medicine

## 2024-05-12 PROBLEM — O43.123 VELAMENTOUS INSERTION OF UMBILICAL CORD IN THIRD TRIMESTER: Status: ACTIVE | Noted: 2024-02-20

## 2024-05-12 PROBLEM — O99.213 OBESITY AFFECTING PREGNANCY IN THIRD TRIMESTER: Status: ACTIVE | Noted: 2023-12-26

## 2024-05-12 PROBLEM — Z3A.32 32 WEEKS GESTATION OF PREGNANCY: Status: ACTIVE | Noted: 2024-02-20

## 2024-05-12 NOTE — PROGRESS NOTES
A fetal ultrasound was completed. See Ob procedures in Epic for an interpretation and recommendations. Do not hesitate to contact us in [facility] if you have questions.    Zan Sheppard MD, MSCE  Maternal Fetal Medicine

## 2024-05-14 NOTE — PROGRESS NOTES
OB/GYN  PN Visit  Elisabet Oropeza  [id number]  5/15/2024  8:29 AM  Elizabeth Bullard PA-C    S: 29 y.o.  32w6d here for PN visit. Pregnancy complicated by abnormal cord insertion site, possible fetal macrosomia (EFW [id number] at 32 weeks, >99%).     OB complaints:  Denies c/o n/v/ha, no edema, no smoking, no DV.   No vb/lof  No cramping/ctxns or signs of PTL.    She reports few infrequent cramps. We reviewed s/sx PTL to call with.    O:    Pre- Vitals      Flowsheet Row Most Recent Value   Prenatal Assessment    Fetal Heart Rate 134   Fundal Height (cm) 34 cm   Movement Present   Prenatal Vitals    Blood Pressure 124/78   Weight - Scale 109 kg (241 lb)   Urine Albumin/Glucose    Dilation/Effacement/Station    Vaginal Drainage    Draining Fluid No   Edema    LLE Edema None   RLE Edema None   Facial Edema None              Gen: no acute distress, nonlabored breathing.  OB exam completed: fundal height, +FHT.  Urine: -/-    A/P:  #1. 32w6d GESTATION  Labor precautions reviewed  Fetal kick counts reviewed  Tdap: rc'd 24  Rh: O+  Yellow packet given and consents signed previously  Breast pump: declines-plans formula feeding  Contraception: plans to restart OCPs pp  GBS: to be done at 36 wks    RTC in 2 weeks    Elizabeth Bullard PA-C  5/15/2024  8:29 AM

## 2024-05-15 ENCOUNTER — ROUTINE PRENATAL (OUTPATIENT)
Dept: OBGYN CLINIC | Facility: CLINIC | Age: 29
End: 2024-05-15

## 2024-05-15 VITALS — SYSTOLIC BLOOD PRESSURE: 124 MMHG | WEIGHT: 241 LBS | DIASTOLIC BLOOD PRESSURE: 78 MMHG | BODY MASS INDEX: 40.1 KG/M2

## 2024-05-15 DIAGNOSIS — E66.01 SEVERE OBESITY DUE TO EXCESS CALORIES AFFECTING PREGNANCY IN THIRD TRIMESTER (HCC): Primary | ICD-10-CM

## 2024-05-15 DIAGNOSIS — O43.123 VELAMENTOUS INSERTION OF UMBILICAL CORD IN THIRD TRIMESTER: ICD-10-CM

## 2024-05-15 DIAGNOSIS — Z3A.32 32 WEEKS GESTATION OF PREGNANCY: ICD-10-CM

## 2024-05-15 DIAGNOSIS — O99.213 SEVERE OBESITY DUE TO EXCESS CALORIES AFFECTING PREGNANCY IN THIRD TRIMESTER (HCC): Primary | ICD-10-CM

## 2024-05-15 PROCEDURE — PNV: Performed by: PHYSICIAN ASSISTANT

## 2024-05-18 ENCOUNTER — CLINICAL SUPPORT (OUTPATIENT)
Age: 29
End: 2024-05-18

## 2024-05-18 DIAGNOSIS — Z32.2 ENCOUNTER FOR CHILDBIRTH INSTRUCTION: Primary | ICD-10-CM

## 2024-05-28 ENCOUNTER — OFFICE VISIT (OUTPATIENT)
Dept: FAMILY MEDICINE CLINIC | Facility: CLINIC | Age: 29
End: 2024-05-28
Payer: COMMERCIAL

## 2024-05-28 VITALS
TEMPERATURE: 97.5 F | DIASTOLIC BLOOD PRESSURE: 86 MMHG | HEIGHT: 65 IN | SYSTOLIC BLOOD PRESSURE: 120 MMHG | BODY MASS INDEX: 40.98 KG/M2 | OXYGEN SATURATION: 98 % | HEART RATE: 86 BPM | WEIGHT: 246 LBS

## 2024-05-28 DIAGNOSIS — Z00.00 ANNUAL PHYSICAL EXAM: Primary | ICD-10-CM

## 2024-05-28 PROCEDURE — 99395 PREV VISIT EST AGE 18-39: CPT | Performed by: FAMILY MEDICINE

## 2024-05-28 NOTE — PROGRESS NOTES
"Adult Annual Physical  Name: Elisabet Oropeza      : 1995      MRN: [id number]  Encounter Provider: Lamberto Bhatt MD  Encounter Date: 2024   Encounter department: [facility]    Assessment & Plan   1. Annual physical exam  Comments:  Normal exam - pt at 34w EGA    Immunizations and preventive care screenings were discussed with patient today. Appropriate education was printed on patient's after visit summary.    Counseling:  Post partum care/follow up         History of Present Illness     Adult Annual Physical:  Patient presents for annual physical. Pt 34w 5d EGA.  Doing well. Mood stable  .     Diet and Physical Activity:  - Diet/Nutrition: well balanced diet.  - Exercise: walking, 5-7 times a week on average and less than 30 minutes on average.    Depression Screening:  - PHQ-2 Score: 0    General Health:  - Sleep: sleeps well and 7-8 hours of sleep on average.  - Hearing: normal hearing bilateral ears.  - Vision: no vision problems.  - Dental: regular dental visits and brushes teeth twice daily.    /GYN Health:  - Follows with GYN: yes.   - Menopause: premenopausal.   - History of STDs: no  - Contraception:. pt pregnant      Advanced Care Planning:  - Has an advanced directive?: no    - Has a durable medical POA?: no    - ACP document given to patient?: no      Review of Systems   Constitutional: Negative.    HENT: Negative.     Eyes: Negative.    Respiratory: Negative.     Cardiovascular: Negative.    Gastrointestinal: Negative.    Endocrine: Negative.    Genitourinary: Negative.    Musculoskeletal: Negative.    Skin: Negative.    Allergic/Immunologic: Negative.    Neurological: Negative.    Hematological: Negative.    Psychiatric/Behavioral: Negative.           Objective     /86   Pulse 86   Temp 97.5 °F (36.4 °C)   Ht 5' 5\" (1.651 m)   Wt 112 kg (246 lb)   LMP 2023 (Approximate)   SpO2 98%   BMI 40.94 kg/m²     Physical Exam  Vitals " reviewed.   Constitutional:       Appearance: She is well-developed.   HENT:      Head: Normocephalic and atraumatic.      Right Ear: Tympanic membrane, ear canal and external ear normal.      Left Ear: Tympanic membrane, ear canal and external ear normal.      Nose: Nose normal.      Mouth/Throat:      Mouth: Mucous membranes are moist.   Eyes:      Extraocular Movements: Extraocular movements intact.      Conjunctiva/sclera: Conjunctivae normal.      Pupils: Pupils are equal, round, and reactive to light.   Neck:      Thyroid: No thyromegaly.      Vascular: No JVD.      Comments: No thyromegaly  Cardiovascular:      Rate and Rhythm: Normal rate and regular rhythm.      Pulses: Normal pulses.      Heart sounds: Normal heart sounds. No murmur heard.  Pulmonary:      Effort: Pulmonary effort is normal.      Breath sounds: Normal breath sounds. No wheezing.   Abdominal:      General: Bowel sounds are normal. There is no distension.      Palpations: Abdomen is soft. There is no mass (fundus well above the umbilicus).      Tenderness: There is no abdominal tenderness.   Musculoskeletal:         General: No swelling, tenderness or deformity. Normal range of motion.      Cervical back: Normal range of motion and neck supple. No tenderness. No muscular tenderness.      Right lower leg: Edema present.      Left lower leg: Edema present.   Lymphadenopathy:      Cervical: No cervical adenopathy.   Skin:     General: Skin is warm.      Capillary Refill: Capillary refill takes less than 2 seconds.   Neurological:      General: No focal deficit present.      Mental Status: She is alert and oriented to person, place, and time.      Cranial Nerves: No cranial nerve deficit.      Sensory: No sensory deficit.      Motor: No weakness or abnormal muscle tone.      Coordination: Coordination normal.      Gait: Gait normal.      Deep Tendon Reflexes: Reflexes normal.   Psychiatric:         Mood and Affect: Mood normal.         Behavior:  Behavior normal.         Thought Content: Thought content normal.         Judgment: Judgment normal.      Comments: PHQ-2/9 Depression Screening    Little interest or pleasure in doing things: 0 - not at all  Feeling down, depressed, or hopeless: 0 - not at all  PHQ-2 Score: 0  PHQ-2 Interpretation: Negative depression screen       Administrative Statements

## 2024-05-29 ENCOUNTER — ROUTINE PRENATAL (OUTPATIENT)
Dept: OBGYN CLINIC | Facility: CLINIC | Age: 29
End: 2024-05-29

## 2024-05-29 VITALS
HEIGHT: 65 IN | DIASTOLIC BLOOD PRESSURE: 82 MMHG | WEIGHT: 244 LBS | SYSTOLIC BLOOD PRESSURE: 120 MMHG | BODY MASS INDEX: 40.65 KG/M2

## 2024-05-29 DIAGNOSIS — E66.01 SEVERE OBESITY DUE TO EXCESS CALORIES AFFECTING PREGNANCY IN THIRD TRIMESTER (HCC): ICD-10-CM

## 2024-05-29 DIAGNOSIS — Z3A.34 34 WEEKS GESTATION OF PREGNANCY: ICD-10-CM

## 2024-05-29 DIAGNOSIS — O43.123 VELAMENTOUS INSERTION OF UMBILICAL CORD IN THIRD TRIMESTER: Primary | ICD-10-CM

## 2024-05-29 DIAGNOSIS — O99.213 SEVERE OBESITY DUE TO EXCESS CALORIES AFFECTING PREGNANCY IN THIRD TRIMESTER (HCC): ICD-10-CM

## 2024-05-29 PROBLEM — O36.8190 DECREASED FETAL MOVEMENT AFFECTING MANAGEMENT OF MOTHER, ANTEPARTUM: Status: RESOLVED | Noted: 2024-03-27 | Resolved: 2024-05-29

## 2024-05-29 PROCEDURE — PNV: Performed by: OBSTETRICS & GYNECOLOGY

## 2024-05-29 NOTE — PROGRESS NOTES
OB/GYN  PN Visit  Elisabet Oropeza  [id number]  2024  12:30 PM  Dr. Elisabet Almeida MD    S: 29 y.o.  34w6d here for PN visit. She denies contractions, but reports some light periods cramps at night. She denies leakage of fluid and vaginal bleeding. She reports good fetal movement. She denies nausea, vomiting, headache, domestic violence, and smoking. She reports increasing edema of her hands and feet. Her pregnancy is complicated by velamentous vs. Marginal PCI, possible macrosomia.     O:  Pre-Dagmar Vitals      Flowsheet Row Most Recent Value   Prenatal Assessment    Fetal Heart Rate 143   Fundal Height (cm) 39 cm   Movement Present   Prenatal Vitals    Blood Pressure 120/82   Weight - Scale 111 kg (244 lb)   Urine Albumin/Glucose    Dilation/Effacement/Station    Vaginal Drainage    Draining Fluid No   Edema    LLE Edema +1   RLE Edema +1          Physical Exam  Vitals reviewed.   Constitutional:       General: She is not in acute distress.     Appearance: Normal appearance. She is well-developed. She is not ill-appearing, toxic-appearing or diaphoretic.   Cardiovascular:      Rate and Rhythm: Normal rate.   Pulmonary:      Effort: Pulmonary effort is normal. No respiratory distress.   Abdominal:      General: There is no distension.      Palpations: Abdomen is soft. There is no mass.      Tenderness: There is no abdominal tenderness. There is no guarding or rebound.   Genitourinary:     Comments: Gravid, nontender  Skin:     General: Skin is warm and dry.   Neurological:      Mental Status: She is alert and oriented to person, place, and time.   Psychiatric:         Mood and Affect: Mood normal.         Behavior: Behavior normal.         A/P:      Problem List          Unprioritized    TONEY (generalized anxiety disorder)    Gastroesophageal reflux disease    Obesity affecting pregnancy in third trimester    Overview     Continue ASA until 36 weeks          Palpitations    34 weeks gestation of  pregnancy    Current Assessment & Plan     - Continue PNV  - Labor precautions reviewed  - Fetal kick counts reviewed  - Labs: UTD  - Ultrasounds: Most recent US showed macrosomia with EFW >99% (AC, BPD, HC all >99%) on 24; Next growth scan scheduled for 24  - Tdap: Administered 24  - Flu Shot: Vaccinated 2023  - RSV:  Will offer during season (-) @ [id number]-87u6x   - COVID: Vaccinated  - Rhogam: N/A  - Delivery:  with epidural  - Contraception: OCPs  - Breastfeeding: Formula feeding  - Pediatrician: Yes - FM  - Edema: Legs - Reviewed elevation, compression socks, hydration and physiology; Hands - reviewed physiology, hydration, and wrist splints for overnight  - RTO in 2 weeks         Velamentous insertion of umbilical cord in third trimester    Overview     Planning weekly NST w/ FRANCI at 36 weeks         Elevated blood-pressure reading without diagnosis of hypertension    Overview     24 at 20w5d x1, repeat normal  No hypertensive diagnosis at this time              Future Appointments   Date Time Provider Department Center   2024 12:45 PM  [facility]   2024  3:30 PM Sangeeta Pritchett MD [facility]   2024  3:15 PM  NURSE MIKE 2 [facility]   2024  9:15 AM Elisabet Almeida MD [facility] Practice-[facility]   2024  6:00 PM [facility]o   2024  3:15 PM  NURSE MIKE 2 [facility]   2024  2:30 PM Elisabet Almeida MD [facility] Practice-[facility]   2024  6:15 PM  NURSE MIKE 2 [facility]   2024  3:30 PM Antonieta Hatch MD [facility] Practice-[facility]   2024  2:45 PM Suha Irwin MD [facility] Practice-Wom   2024  3:45 PM Lamberto Bhatt MD [facility]   2025  1:30 PM Lamberto Bhatt MD [facility] Practice-[facility]         Elisabet Almeida,  MD  5/29/2024  12:30 PM

## 2024-05-29 NOTE — ASSESSMENT & PLAN NOTE
- Continue PNV  - Labor precautions reviewed  - Fetal kick counts reviewed  - Labs: UTD  - Ultrasounds: Most recent US showed macrosomia with EFW >99% (AC, BPD, HC all >99%) on 24; Next growth scan scheduled for 24  - Tdap: Administered 24  - Flu Shot: Vaccinated 2023  - RSV:  Will offer during season (-) @ [id number]   - COVID: Vaccinated  - Rhogam: N/A  - Delivery:  with epidural  - Contraception: OCPs  - Breastfeeding: Formula feeding  - Pediatrician: Yes - FM  - Edema: Legs - Reviewed elevation, compression socks, hydration and physiology; Hands - reviewed physiology, hydration, and wrist splints for overnight  - RTO in 2 weeks

## 2024-06-06 ENCOUNTER — ULTRASOUND (OUTPATIENT)
Dept: PERINATAL CARE | Facility: CLINIC | Age: 29
End: 2024-06-06
Payer: COMMERCIAL

## 2024-06-06 VITALS
DIASTOLIC BLOOD PRESSURE: 68 MMHG | BODY MASS INDEX: 40.89 KG/M2 | WEIGHT: 245.4 LBS | HEART RATE: 97 BPM | HEIGHT: 65 IN | SYSTOLIC BLOOD PRESSURE: 126 MMHG

## 2024-06-06 DIAGNOSIS — Z36.89 ENCOUNTER FOR ULTRASOUND TO CHECK FETAL GROWTH: ICD-10-CM

## 2024-06-06 DIAGNOSIS — Z3A.36 36 WEEKS GESTATION OF PREGNANCY: Primary | ICD-10-CM

## 2024-06-06 DIAGNOSIS — O43.123 VELAMENTOUS INSERTION OF UMBILICAL CORD IN THIRD TRIMESTER: ICD-10-CM

## 2024-06-06 DIAGNOSIS — O99.213 OBESITY AFFECTING PREGNANCY IN THIRD TRIMESTER, UNSPECIFIED OBESITY TYPE: ICD-10-CM

## 2024-06-06 PROCEDURE — 76816 OB US FOLLOW-UP PER FETUS: CPT | Performed by: OBSTETRICS & GYNECOLOGY

## 2024-06-06 PROCEDURE — 59025 FETAL NON-STRESS TEST: CPT | Performed by: OBSTETRICS & GYNECOLOGY

## 2024-06-06 PROCEDURE — 99213 OFFICE O/P EST LOW 20 MIN: CPT | Performed by: OBSTETRICS & GYNECOLOGY

## 2024-06-06 NOTE — PATIENT INSTRUCTIONS
Thank you for choosing us for your  care today.  If you have any questions about your ultrasound or care, please do not hesitate to contact us or your primary obstetrician.        Some general instructions for your pregnancy are:    Exercise: Aim for 22 minutes per day (150 minutes per week) of regular exercise.  Walking is great!  Nutrition: Choose healthy sources of calcium, iron, and protein.  Learn about Preeclampsia: preeclampsia is a common, potentially serious high blood pressure complication in pregnancy.  A blood pressure of 140mmHg (systolic or top number) or 90mmHg (diastolic or bottom number) should be evaluated by your doctor.  Aspirin is sometimes prescribed in early pregnancy to prevent preeclampsia in women with risk factors - ask your obstetrician if you should be on this medication.  For more resources, visit:  https://[email or web address].[email or web address]  If you smoke, please try to quit completely but also try to reduce your smoking by as much as possible (as soon as possible).  Do not vape.  Please also avoid cannabis products.  Other warning signs to watch out for in pregnancy or postpartum: chest pain, obstructed breathing or shortness of breath, seizures, thoughts of hurting yourself or your baby, bleeding, a painful or swollen leg, fever, or headache (see [facility] POST-BIRTH Warning Signs campaign).  If these happen call 911.  Itching is also not normal in pregnancy and if you experience this, especially over your hands and feet, potentially worse at night, notify your doctors.

## 2024-06-06 NOTE — PROGRESS NOTES
Non-Stress Testing:    Non-Stress test, equipment, procedure, and expected outcomes explained. Reviewed fetal kick counts and when to call OB.Verified patient understanding of fetal kick counts with teach back method. Patient reports feeling daily fetal movements. Patient has no questions or concerns.                Dr. Watson viewed NST strip prior to completion of visit

## 2024-06-06 NOTE — PROGRESS NOTES
This patient received  care under my supervision on 24 at 36w0d gestational age at [facility].  The note is contained in the ultrasound report located under OB Procedures tab in Epic.  Please call our office at [phone] with questions.  -Brii Watson MD    The time spent on this patient on the encounter date included 5 minutes previsit service time reviewing records and precharting, 5 minutes face-to-face service time counseling regarding results and coordinating care, and  4 minutes charting, totalling 14 minutes.

## 2024-06-11 ENCOUNTER — ROUTINE PRENATAL (OUTPATIENT)
Dept: OBGYN CLINIC | Facility: CLINIC | Age: 29
End: 2024-06-11

## 2024-06-11 VITALS — DIASTOLIC BLOOD PRESSURE: 74 MMHG | BODY MASS INDEX: 40.94 KG/M2 | WEIGHT: 246 LBS | SYSTOLIC BLOOD PRESSURE: 120 MMHG

## 2024-06-11 DIAGNOSIS — Z3A.36 36 WEEKS GESTATION OF PREGNANCY: Primary | ICD-10-CM

## 2024-06-11 DIAGNOSIS — Z34.93 PRENATAL CARE IN THIRD TRIMESTER: ICD-10-CM

## 2024-06-11 DIAGNOSIS — O43.123 VELAMENTOUS INSERTION OF UMBILICAL CORD IN THIRD TRIMESTER: ICD-10-CM

## 2024-06-11 DIAGNOSIS — O99.213 OBESITY AFFECTING PREGNANCY IN THIRD TRIMESTER, UNSPECIFIED OBESITY TYPE: ICD-10-CM

## 2024-06-11 PROCEDURE — 87150 DNA/RNA AMPLIFIED PROBE: CPT | Performed by: STUDENT IN AN ORGANIZED HEALTH CARE EDUCATION/TRAINING PROGRAM

## 2024-06-11 PROCEDURE — PNV: Performed by: STUDENT IN AN ORGANIZED HEALTH CARE EDUCATION/TRAINING PROGRAM

## 2024-06-11 NOTE — PROGRESS NOTES
Elisabet is a 29 y.o.  36w5d. Reports ++FM, no LOF, VB, or regular contractions.     Vitals:    24 1500   BP: 120/74     S=D  +FHTs    A/P:  Third tri labs notable for hgb 10.9  Rh status POS    Told breech at last ultrsaound, documented vertex  24: 3553 grams - 7 lbs 13 oz (98%), >99th%ile  Reviewed concern for possible fetal macrosomia.     Reviewed for suspected macrosomic infants of non-diabetics mothers, elective  offered when greater than 5000g given increased risk of shoulder dystocia and associated complications including but not limited to fetal nerve injury, broken bones (clavicle, humerus, etc.),  asphyxia leading to HIE or death. Not currently at 5000g or expected to reach by 40 weeks. Reasonable to request CS or plan for IOL at 39 weeks. May consider IOL at 39 weeks.    If any concerns regarding labor curve, understands may recommend CS and she is amenable.    Today on TAUS VERTEX!    GBS collected today  TDAP vaccine--completed    Discussed pre-term labor precautions  Return to office in 1 weeks

## 2024-06-13 ENCOUNTER — ULTRASOUND (OUTPATIENT)
Facility: HOSPITAL | Age: 29
End: 2024-06-13
Payer: COMMERCIAL

## 2024-06-13 VITALS
BODY MASS INDEX: 41.02 KG/M2 | SYSTOLIC BLOOD PRESSURE: 114 MMHG | WEIGHT: 246.2 LBS | HEIGHT: 65 IN | HEART RATE: 80 BPM | DIASTOLIC BLOOD PRESSURE: 68 MMHG

## 2024-06-13 DIAGNOSIS — Z3A.37 37 WEEKS GESTATION OF PREGNANCY: ICD-10-CM

## 2024-06-13 DIAGNOSIS — O43.123 VELAMENTOUS INSERTION OF UMBILICAL CORD IN THIRD TRIMESTER: Primary | ICD-10-CM

## 2024-06-13 LAB — GP B STREP DNA SPEC QL NAA+PROBE: NEGATIVE

## 2024-06-13 PROCEDURE — 76815 OB US LIMITED FETUS(S): CPT | Performed by: OBSTETRICS & GYNECOLOGY

## 2024-06-13 PROCEDURE — 59025 FETAL NON-STRESS TEST: CPT | Performed by: OBSTETRICS & GYNECOLOGY

## 2024-06-13 NOTE — LETTER
NST sleeve cover sheet    Patient name: Elisabet Oropeza  : 1995  MRN: [id number]    CLAUDIA: Estimated Date of Delivery: 24    Obstetrician: [facility]    Reason(s) for testing: Velamentous cord insertion, BMI >35    Testing frequency:    ___  2x/wk  ___  1x/wk  ___  Dopplers  ___  BPP?      Last growth scan: __________, _________, _________    Baby:      Male   /   Female   /   [place]             Baby Name: ___________________            IOL or  C/S: _____________________

## 2024-06-13 NOTE — PROGRESS NOTES
Repeat Non-Stress Testing:    Patient verbalizes +FM. Pt denies ALL:               Leaking of fluid   Contractions   Vaginal bleeding   Decreased fetal movement    Patient is performing daily kick counts. Patient has no questions or concerns.   NST strip reviewed by Dr. Watson.

## 2024-06-14 ENCOUNTER — TELEPHONE (OUTPATIENT)
Dept: OBGYN CLINIC | Facility: CLINIC | Age: 29
End: 2024-06-14

## 2024-06-14 NOTE — PROGRESS NOTES
OB/GYN  PN Visit  Elisabet Oropeza  [id number]  2024  12:06 PM  Qiana ALBARRAN    S: 29 y.o.  37w1d here for PN visit. She reports contractions overnight. She denies leakage of fluid and vaginal bleeding. She reports good fetal movement. She denies nausea, vomiting, headache, domestic violence, and smoking. She reports edema. Her pregnancy is complicated by velamentous vs. Marginal PCI, possible macrosomia, and mild anemia.     O:  Pre- Vitals      Flowsheet Row Most Recent Value   Prenatal Assessment    Fetal Heart Rate 130s   Fundal Height (cm) 39 cm   Movement Present   Presentation Vertex   Prenatal Vitals    Blood Pressure 128/84   Weight - Scale 112 kg (247 lb)   Urine Albumin/Glucose    Dilation/Effacement/Station    Cervical Dilation .5   Vaginal Drainage    Draining Fluid No   Edema    LLE Edema Trace   RLE Edema Trace          Physical Exam  Vitals reviewed.   Constitutional:       General: She is not in acute distress.     Appearance: Normal appearance. She is well-developed. She is not ill-appearing, toxic-appearing or diaphoretic.   Cardiovascular:      Rate and Rhythm: Normal rate.   Pulmonary:      Effort: Pulmonary effort is normal. No respiratory distress.   Abdominal:      General: There is no distension.      Palpations: Abdomen is soft. There is no mass.      Tenderness: There is no abdominal tenderness. There is no guarding or rebound.   Genitourinary:     Comments: Gravid, nontender  Skin:     General: Skin is warm and dry.   Neurological:      Mental Status: She is alert and oriented to person, place, and time.   Psychiatric:         Mood and Affect: Mood normal.         Behavior: Behavior normal.         A/P:    Problem List          Unprioritized    TONEY (generalized anxiety disorder)    Gastroesophageal reflux disease    Obesity affecting pregnancy in third trimester    Overview     Continue ASA until 36 weeks          37 weeks gestation of pregnancy    Current  Assessment & Plan     - Continue PNV  - Labor precautions reviewed  - Fetal kick counts reviewed  - Labs: UTD. Recent CBC shows mild anemia.   - Genetics: SMA and CF negative.  - Ultrasounds: Final one completed 24, WNL. Recommendation for weekly FRANCI and NST.   - Tdap: Received 24.  - Flu Shot: Vaccinated 2023.  - RSV:  Will offer during season (-) @ 68a1d-25y1n   - COVID: vaccinated.  - Rhogam: N/A  - Delivery:  with epidural, open to CS if medically necessary.  - IOL: Requests eIOL; Message sent  - Contraception: OCPs  - Breastfeeding: formula feeding  - Pediatrician: yes.   - Labor talk done  - RTO in 1 week         Velamentous insertion of umbilical cord in third trimester    Overview     Planning weekly NST w/ FRANCI at 36 weeks         Elevated blood-pressure reading without diagnosis of hypertension    Overview     24 at 20w5d x1, repeat normal  No hypertensive diagnosis at this time            Future Appointments   Date Time Provider Department Center   2024  3:15 PM  NURSE MIKE 2 [facility]   2024  2:30 PM Elisabet Almeida MD [facility]   2024  6:15 PM  NURSE MIKE 2 [facility]   2024  3:30 PM Antonieta Hatch MD [facility] Practice-Wo   2024  2:45 PM Suha Irwin MD [facility] Practice-[facility]   2024  3:45 PM Lamberto Bhatt MD [facility] Practice[facility]   2025  1:30 PM Lamberto Bhatt MD [facility] Practice-[facility]         Qiana Bolaños  2024  12:06 PM

## 2024-06-17 ENCOUNTER — TELEPHONE (OUTPATIENT)
Dept: OBGYN CLINIC | Facility: CLINIC | Age: 29
End: 2024-06-17

## 2024-06-17 ENCOUNTER — ROUTINE PRENATAL (OUTPATIENT)
Dept: OBGYN CLINIC | Facility: CLINIC | Age: 29
End: 2024-06-17

## 2024-06-17 VITALS — BODY MASS INDEX: 41.1 KG/M2 | WEIGHT: 247 LBS | DIASTOLIC BLOOD PRESSURE: 84 MMHG | SYSTOLIC BLOOD PRESSURE: 128 MMHG

## 2024-06-17 DIAGNOSIS — Z3A.37 37 WEEKS GESTATION OF PREGNANCY: ICD-10-CM

## 2024-06-17 DIAGNOSIS — O43.123 VELAMENTOUS INSERTION OF UMBILICAL CORD IN THIRD TRIMESTER: ICD-10-CM

## 2024-06-17 DIAGNOSIS — O99.213 OBESITY AFFECTING PREGNANCY IN THIRD TRIMESTER, UNSPECIFIED OBESITY TYPE: Primary | ICD-10-CM

## 2024-06-17 PROBLEM — R00.2 PALPITATIONS: Status: RESOLVED | Noted: 2024-02-02 | Resolved: 2024-06-17

## 2024-06-17 PROCEDURE — PNV: Performed by: OBSTETRICS & GYNECOLOGY

## 2024-06-17 NOTE — ASSESSMENT & PLAN NOTE
- Continue PNV  - Labor precautions reviewed  - Fetal kick counts reviewed  - Labs: UTD. Recent CBC shows mild anemia.   - Genetics: SMA and CF negative.  - Ultrasounds: Final one completed 24, WNL. Recommendation for weekly FRANCI and NST.   - Tdap: Received 24.  - Flu Shot: Vaccinated 2023.  - RSV:  Will offer during season (-) @ 26i4d-45y6c   - COVID: vaccinated.  - Rhogam: N/A  - Delivery:  with epidural, open to CS if medically necessary.  - IOL: Requests eIOL; Message sent  - Contraception: OCPs  - Breastfeeding: formula feeding  - Pediatrician: yes.   - Labor talk done  - RTO in 1 week

## 2024-06-17 NOTE — TELEPHONE ENCOUNTER
----- Message from Elisabet Almeida MD sent at 2024 12:05 PM EDT -----  Regarding: Schedule eIOL  Please schedule IOL due to elective.   Patient requests schedulinwks (aware only able to schedule 1 week out)  Patient is a  at 37w4d  Estimated Date of Delivery: 24  Cervical exam: 0.5cm  Ripening: Yes  PM slot: Yes  AM slot: Yes (with PM)  Preferences: N/A    Thanks!

## 2024-06-18 ENCOUNTER — NURSE TRIAGE (OUTPATIENT)
Age: 29
End: 2024-06-18

## 2024-06-18 ENCOUNTER — NURSE TRIAGE (OUTPATIENT)
Dept: OTHER | Facility: OTHER | Age: 29
End: 2024-06-18

## 2024-06-18 ENCOUNTER — OFFICE VISIT (OUTPATIENT)
Dept: URGENT CARE | Facility: MEDICAL CENTER | Age: 29
End: 2024-06-18
Payer: COMMERCIAL

## 2024-06-18 VITALS
DIASTOLIC BLOOD PRESSURE: 79 MMHG | BODY MASS INDEX: 41.93 KG/M2 | RESPIRATION RATE: 18 BRPM | TEMPERATURE: 97.8 F | HEART RATE: 78 BPM | WEIGHT: 252 LBS | SYSTOLIC BLOOD PRESSURE: 139 MMHG | OXYGEN SATURATION: 96 %

## 2024-06-18 DIAGNOSIS — L50.9 URTICARIA: Primary | ICD-10-CM

## 2024-06-18 PROCEDURE — G0382 LEV 3 HOSP TYPE B ED VISIT: HCPCS | Performed by: PHYSICIAN ASSISTANT

## 2024-06-18 PROCEDURE — S9083 URGENT CARE CENTER GLOBAL: HCPCS | Performed by: PHYSICIAN ASSISTANT

## 2024-06-18 RX ORDER — PREDNISONE 10 MG/1
10 TABLET ORAL DAILY
Qty: 5 TABLET | Refills: 0 | Status: ON HOLD | OUTPATIENT
Start: 2024-06-18 | End: 2024-06-20

## 2024-06-18 NOTE — PROGRESS NOTES
[facility]        NAME: Elisbaet Oropeza is a 29 y.o. female  : 1995    MRN: [id number]  DATE: 2024  TIME: 7:29 PM    Assessment and Plan   Urticaria [L50.9]  1. Urticaria              Patient Instructions     Urticaria  Prednisone once daily x 5 days  Follow up with PCP in 3-5 days.  Proceed to  ER if symptoms worsen.    Chief Complaint     Chief Complaint   Patient presents with    Rash     Pt. With a rash to her B/L arms that began 2 days ago. She is 38 weeks pregnant.          History of Present Illness       29-year-old female who presents complaining of itchy rash to bilateral arms.  Patient states that she recently started eating grape leaves that she never did before.  Denies any swelling of lips, tongue, difficulty swallowing, shortness of breath.  Patient has not taking any medications as she is 38 weeks pregnant.    Rash  Pertinent negatives include no cough or shortness of breath.       Review of Systems   Review of Systems   Constitutional: Negative.    HENT: Negative.     Eyes: Negative.    Respiratory: Negative.  Negative for cough, chest tightness, shortness of breath, wheezing and stridor.    Cardiovascular: Negative.  Negative for chest pain, palpitations and leg swelling.   Skin:  Positive for rash.         Current Medications       Current Outpatient Medications:     busPIRone (BUSPAR) 5 mg tablet, 2 TABLETS TWICE DAILY, Disp: 90 tablet, Rfl: 5    famotidine (PEPCID) 20 mg tablet, Take 1 tablet (20 mg total) by mouth 2 (two) times a day, Disp: 180 tablet, Rfl: 0    Prenatal Vit-Fe Fumarate-FA (PRENATAL PO), Take by mouth, Disp: , Rfl:     Current Allergies     Allergies as of 2024 - Reviewed 2024   Allergen Reaction Noted    Augmentin [amoxicillin-pot clavulanate] Hives 2015            The following portions of the patient's history were reviewed and updated as appropriate: allergies, current medications, past family history, past medical history,  past social history, past surgical history and problem list.     Past Medical History:   Diagnosis Date    GERD (gastroesophageal reflux disease)     Headache(784.0)     Stress headaches    Ovarian cyst     last assessed 06/24/2014       Past Surgical History:   Procedure Laterality Date    WISDOM TOOTH EXTRACTION  2018       Family History   Problem Relation Age of Onset    Gallbladder disease Mother     Migraines Mother     Hypertension Father     Asthma Father     Endometriosis Sister         Partial Hyst    Ulcerative colitis Maternal Grandmother     Other Maternal Grandfather         cardiac disorder         Medications have been verified.        Objective   /79   Pulse 78   Temp 97.8 °F (36.6 °C)   Resp 18   Wt 114 kg (252 lb)   LMP 08/31/2023 (Approximate)   SpO2 96%   BMI 41.93 kg/m²        Physical Exam     Physical Exam  Constitutional:       Appearance: Normal appearance. She is well-developed.   HENT:      Head: Normocephalic and atraumatic.      Right Ear: External ear normal.      Left Ear: External ear normal.      Nose: Nose normal.      Mouth/Throat:      Mouth: Oropharynx is clear and moist.   Cardiovascular:      Rate and Rhythm: Normal rate and regular rhythm.      Heart sounds: Normal heart sounds.   Pulmonary:      Effort: Pulmonary effort is normal. No respiratory distress.      Breath sounds: Normal breath sounds. No wheezing or rales.   Chest:      Chest wall: No tenderness.   Musculoskeletal:      Cervical back: Normal range of motion and neck supple.   Lymphadenopathy:      Cervical: No cervical adenopathy.   Neurological:      Mental Status: She is alert.         Patient advised to stop it and the grape leaves and evaluate if the rash disappears then a prescription for prednisone once daily x 5 days has been provided if no improvement by removing the grape leaves

## 2024-06-18 NOTE — TELEPHONE ENCOUNTER
Regardin wks- decrease in baby movement  ----- Message from [address] sent at 2024  9:01 AM EDT -----  Pt called in - 36 wks- decrease in baby movement since last night

## 2024-06-18 NOTE — TELEPHONE ENCOUNTER
"37  patient reports having contractions over w/e and yesterday, irregular contractions lasting approx 1-2 minutes, throughout the day. Pain very tolerable.  States she has been having some spotting (pinkish brown) after cervical check yesterday, discharge amount today is less wearing panty liner and notices when she wipes. Denies LOF, fever just very tired. Eating/drinking normally.     Kick counts were taking longer than usual, however feels fetal movements now. Reports almost full 2 hours to feel 10 movements.   On the phone patient reports baby is moving, Patient prefers to check with provider first and will go to L&D if recommended.    ESC to provider on call- Elisabet Solis She can continue to monitor until contractions are persistent, consistent, and painful, approximately every 5 minutes, lasting a minute, for more than an hour. She can continue FKCs. She spotting is to be expected after the check yesterday. This should resolve with time. If she has bleeding like a period, decreased fetal movement, or loss of fluid, she should come to triage.   Phone call back to patient and reviewed above, patient verbalized understanding, no questions.    Reason for Disposition   Baby moving less today (e.g., kick count < 5 in 1 hour or < 10 in 2 hours) and 23 or more weeks pregnant    Additional Information   Pregnant < 37 weeks (i.e., ) and having contractions or other symptoms of labor    Answer Assessment - Initial Assessment Questions  1. FETAL MOVEMENT: \"Has the baby's movement decreased or changed significantly from normal?\" (e.g., yes, no; describe) baby movements less than usual        2. CLAUDIA: \"What date are you expecting to deliver?\"       24  3. PREGNANCY: \"How many weeks pregnant are you?\"         4. OTHER SYMPTOMS: \"Do you have any other symptoms?\" (e.g., abdominal pain, leaking fluid from vagina, vaginal bleeding, etc.)     Spotting after SVE yesterday    Protocols used: Pregnancy - " Decreased Fetal Movement-ADULT-[place], Pregnancy - Labor - -ADULT-[place]

## 2024-06-19 ENCOUNTER — HOSPITAL ENCOUNTER (OUTPATIENT)
Facility: HOSPITAL | Age: 29
Setting detail: OBSERVATION
Discharge: HOME | End: 2024-06-20
Attending: STUDENT IN AN ORGANIZED HEALTH CARE EDUCATION/TRAINING PROGRAM | Admitting: STUDENT IN AN ORGANIZED HEALTH CARE EDUCATION/TRAINING PROGRAM
Payer: COMMERCIAL

## 2024-06-19 ENCOUNTER — NURSE TRIAGE (OUTPATIENT)
Age: 29
End: 2024-06-19

## 2024-06-19 DIAGNOSIS — Z3A.37 37 WEEKS GESTATION OF PREGNANCY: Primary | ICD-10-CM

## 2024-06-19 DIAGNOSIS — L50.9 URTICARIA: ICD-10-CM
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
## 2024-06-19 PROBLEM — O36.8130 DECREASED FETAL MOVEMENTS IN THIRD TRIMESTER: Status: ACTIVE | Noted: 2024-06-19

## 2024-06-19 PROBLEM — O47.00 PRETERM CONTRACTIONS: Status: ACTIVE | Noted: 2024-06-19

## 2024-06-19 PROBLEM — O47.9 UTERINE CONTRACTIONS: Status: ACTIVE | Noted: 2024-06-19

## 2024-06-19 LAB
ABO GROUP BLD: NORMAL
APTT PPP: 26 SECONDS (ref 23–37)
BASOPHILS # BLD AUTO: 0.04 THOUSANDS/ÂΜL (ref 0–0.1)
BASOPHILS NFR BLD AUTO: 0 % (ref 0–1)
BLD GP AB SCN SERPL QL: NEGATIVE
EOSINOPHIL # BLD AUTO: 0.05 THOUSAND/ÂΜL (ref 0–0.61)
EOSINOPHIL NFR BLD AUTO: 1 % (ref 0–6)
ERYTHROCYTE [DISTWIDTH] IN BLOOD BY AUTOMATED COUNT: 16.9 % (ref 11.6–15.1)
FIBRINOGEN PPP-MCNC: 684 MG/DL (ref 207–520)
HCT VFR BLD AUTO: 39.4 % (ref 34.8–46.1)
HGB BLD-MCNC: 12.8 G/DL (ref 11.5–15.4)
IMM GRANULOCYTES # BLD AUTO: 0.06 THOUSAND/UL (ref 0–0.2)
IMM GRANULOCYTES NFR BLD AUTO: 1 % (ref 0–2)
INR PPP: 0.9 (ref 0.84–1.19)
LYMPHOCYTES # BLD AUTO: 2.38 THOUSANDS/ÂΜL (ref 0.6–4.47)
LYMPHOCYTES NFR BLD AUTO: 23 % (ref 14–44)
MCH RBC QN AUTO: 30.8 PG (ref 26.8–34.3)
MCHC RBC AUTO-ENTMCNC: 32.5 G/DL (ref 31.4–37.4)
MCV RBC AUTO: 95 FL (ref 82–98)
MONOCYTES # BLD AUTO: 0.64 THOUSAND/ÂΜL (ref 0.17–1.22)
MONOCYTES NFR BLD AUTO: 6 % (ref 4–12)
NEUTROPHILS # BLD AUTO: 7.22 THOUSANDS/ÂΜL (ref 1.85–7.62)
NEUTS SEG NFR BLD AUTO: 69 % (ref 43–75)
NRBC BLD AUTO-RTO: 0 /100 WBCS
PLATELET # BLD AUTO: 183 THOUSANDS/UL (ref 149–390)
PLATELET # BLD AUTO: 183 THOUSANDS/UL (ref 149–390)
PMV BLD AUTO: 11.3 FL (ref 8.9–12.7)
PMV BLD AUTO: 11.3 FL (ref 8.9–12.7)
PROTHROMBIN TIME: 12.8 SECONDS (ref 11.6–14.5)
RBC # BLD AUTO: 4.16 MILLION/UL (ref 3.81–5.12)
RH BLD: POSITIVE
SPECIMEN EXPIRATION DATE: NORMAL
TREPONEMA PALLIDUM IGG+IGM AB [PRESENCE] IN SERUM OR PLASMA BY IMMUNOASSAY: NORMAL
WBC # BLD AUTO: 10.39 THOUSAND/UL (ref 4.31–10.16)

## 2024-06-19 PROCEDURE — G0379 DIRECT REFER HOSPITAL OBSERV: HCPCS

## 2024-06-19 PROCEDURE — 96360 HYDRATION IV INFUSION INIT: CPT

## 2024-06-19 PROCEDURE — 85610 PROTHROMBIN TIME: CPT | Performed by: PHYSICIAN ASSISTANT

## 2024-06-19 PROCEDURE — 86850 RBC ANTIBODY SCREEN: CPT | Performed by: PHYSICIAN ASSISTANT

## 2024-06-19 PROCEDURE — 76815 OB US LIMITED FETUS(S): CPT | Performed by: STUDENT IN AN ORGANIZED HEALTH CARE EDUCATION/TRAINING PROGRAM

## 2024-06-19 PROCEDURE — 86780 TREPONEMA PALLIDUM: CPT | Performed by: PHYSICIAN ASSISTANT

## 2024-06-19 PROCEDURE — 99214 OFFICE O/P EST MOD 30 MIN: CPT

## 2024-06-19 PROCEDURE — 85384 FIBRINOGEN ACTIVITY: CPT | Performed by: PHYSICIAN ASSISTANT

## 2024-06-19 PROCEDURE — 99222 1ST HOSP IP/OBS MODERATE 55: CPT | Performed by: PHYSICIAN ASSISTANT

## 2024-06-19 PROCEDURE — 86900 BLOOD TYPING SEROLOGIC ABO: CPT | Performed by: PHYSICIAN ASSISTANT

## 2024-06-19 PROCEDURE — 85049 AUTOMATED PLATELET COUNT: CPT | Performed by: PHYSICIAN ASSISTANT

## 2024-06-19 PROCEDURE — 85730 THROMBOPLASTIN TIME PARTIAL: CPT | Performed by: PHYSICIAN ASSISTANT

## 2024-06-19 PROCEDURE — 86901 BLOOD TYPING SEROLOGIC RH(D): CPT | Performed by: PHYSICIAN ASSISTANT

## 2024-06-19 PROCEDURE — 85025 COMPLETE CBC W/AUTO DIFF WBC: CPT | Performed by: PHYSICIAN ASSISTANT

## 2024-06-19 RX ORDER — CALCIUM CARBONATE 500 MG/1
1000 TABLET, CHEWABLE ORAL 3 TIMES DAILY PRN
Status: DISCONTINUED | OUTPATIENT
Start: 2024-06-19 | End: 2024-06-20 | Stop reason: HOSPADM

## 2024-06-19 RX ORDER — ACETAMINOPHEN 325 MG/1
975 TABLET ORAL EVERY 8 HOURS PRN
Status: DISCONTINUED | OUTPATIENT
Start: 2024-06-19 | End: 2024-06-20 | Stop reason: HOSPADM

## 2024-06-19 RX ORDER — BUSPIRONE HYDROCHLORIDE 10 MG/1
10 TABLET ORAL 2 TIMES DAILY
Status: DISCONTINUED | OUTPATIENT
Start: 2024-06-19 | End: 2024-06-20 | Stop reason: HOSPADM

## 2024-06-19 RX ORDER — PREDNISONE 10 MG/1
10 TABLET ORAL DAILY
Status: DISCONTINUED | OUTPATIENT
Start: 2024-06-19 | End: 2024-06-20 | Stop reason: HOSPADM

## 2024-06-19 RX ORDER — FAMOTIDINE 20 MG/1
20 TABLET, FILM COATED ORAL 2 TIMES DAILY
Status: DISCONTINUED | OUTPATIENT
Start: 2024-06-19 | End: 2024-06-20 | Stop reason: HOSPADM

## 2024-06-19 RX ORDER — ONDANSETRON 2 MG/ML
4 INJECTION INTRAMUSCULAR; INTRAVENOUS EVERY 6 HOURS PRN
Status: DISCONTINUED | OUTPATIENT
Start: 2024-06-19 | End: 2024-06-20 | Stop reason: HOSPADM

## 2024-06-19 RX ADMIN — BUSPIRONE HYDROCHLORIDE 10 MG: 10 TABLET ORAL at 21:41

## 2024-06-19 RX ADMIN — SODIUM CHLORIDE, SODIUM LACTATE, POTASSIUM CHLORIDE, AND CALCIUM CHLORIDE 1000 ML: .6; .31; .03; .02 INJECTION, SOLUTION INTRAVENOUS at 09:57

## 2024-06-19 RX ADMIN — FAMOTIDINE 20 MG: 20 TABLET, FILM COATED ORAL at 21:40

## 2024-06-19 RX ADMIN — PREDNISONE 10 MG: 10 TABLET ORAL at 21:41

## 2024-06-19 NOTE — H&P
"H&P - Obstetrics   Elisabet Oropeza 29 y.o. female MRN: [id number]  Unit/Bed#: LD TRIAGE 3 Encounter: [id number]    Assessment and Plan:  29 y.o.  at 37w6d who is being admitted for monitoring of possible PTL. By issue:   contractions  Assessment & Plan    Written:JADE [place]-C6/ 11:32 AM     Received 1 L of LR.  Continuous fetal monitoring.       Fall from chair  Assessment & Plan  Overnight observation.  CBC, coags, Type & screen, and RPR ordered.    Decreased fetal movements in third trimester  Assessment & Plan  Cephalic by ultrasound  Clinical EFW by Leopold's: 98%ile  GBS prophylaxis not indicated  Cervical exam: 0.5/0/-5. Plan for observation of possible early labor.          Plan of care discussed with Dr. Hatch.    This patient will be an INPATIENT and I certify the anticipated length of stay is >2 Midnights.    History of Present Illness     Chief Complaint: \"I fell off a chair last night.\"      History of Present Illness:  Elisabet Oropeza is a 29 y.o.  with an CLAUDIA of 2024, by Ultrasound at 37w6d weeks gestation who presents after a fall off a chair.  Patient states she was sitting down in a chair yesterday and fell on her L buttocks and hip.  Denies hitting her abdomen or her head.  Notes it is taking longer for her to meet her kick counts.  Has been feeling BH contractions.  Notes a small amount of spotting after her cervical check at the office on .  Denies leaking.  Also denies HA, CP, SOB, and abdominal pain.  Notes moderate swelling of b/l LE.    Contractions: Yes  Loss of fluid: No  Vaginal bleeding: No  Fetal movement: Yes    ROS otherwise negative.     Obstetrician: Holden    Pregnancy complications:   Velamentous cord insertion  Evolving macrosomia    OB History    Para Term  AB Living   1 0 0 0 0 0   SAB IAB Ectopic Multiple Live Births   0 0 0 0 0      # Outcome Date GA Lbr Carlos/2nd Weight Sex Type Anes PTL Lv   1 " "Current               Obstetric Comments   2023 SMA neg; Hgb electrophoresis WNL       Baby complications/comments: Macrosomia    Review of Systems  12-point ROS negative unless stated in HPI.    Historical Information   Past Medical History:   Diagnosis Date    GERD (gastroesophageal reflux disease)     Headache(784.0)     Stress headaches    Ovarian cyst     last assessed 06/24/2014     Past Surgical History:   Procedure Laterality Date    WISDOM TOOTH EXTRACTION  2018     Social History   Social History     Substance and Sexual Activity   Alcohol Use Yes    Alcohol/week: 2.0 standard drinks of alcohol    Types: 2 Glasses of wine per week    Comment: I drink socially     Social History     Substance and Sexual Activity   Drug Use Never     Social History     Tobacco Use   Smoking Status Never    Passive exposure: Never   Smokeless Tobacco Never     Family History: non-contributory    Meds/Allergies      Medications Prior to Admission:     busPIRone (BUSPAR) 5 mg tablet    famotidine (PEPCID) 20 mg tablet    Prenatal Vit-Fe Fumarate-FA (PRENATAL PO)    predniSONE 10 mg tablet     Allergies   Allergen Reactions    Augmentin [Amoxicillin-Pot Clavulanate] Hives     Pt. Breaks up in hives       Objective:  Vitals: Blood pressure 128/79, pulse 75, temperature 98.8 °F (37.1 °C), temperature source Oral, resp. rate 20, height 5' 5\" (1.651 m), weight 114 kg (252 lb), last menstrual period 08/31/2023.Body mass index is 41.93 kg/m².     Physical Exam  GEN: alert and oriented x 3, no apparent distress, appears well  CARDIAC: RRR  PULMONARY: non-labored breathing  ABDOMEN: gravid, soft, no tenderness  GENITOURINARY: No vaginal bleeding or leaking.   EXTREMITIES: nontender, moderate edema  FETAL ASSESSMENT:  Fetal heart rate: 147/moderate variability/15 x 15 accelerations/no decelerations; Category 1  [place]: Contractions every 3-5 minutes.    Prenatal Labs:   Blood type: O+  Antibody screen: negative  HIV: " non-reactive  Hepatitis B surface antigen: non-reactive  Hepatitis C antibody: non-reactive  Syphilis screening: negative  Gonorrhea/Chlamydia: negative/negative  Rubella: Immune  Varicella: Unknown  1 hour GTT: 107  Group B strep: negative  Last Hemoglobin: 12.8 g/dL  Last Platelet count: 183 K    Invasive Devices       Peripheral Intravenous Line  Duration             Peripheral IV 06/19/24 Left Antecubital <1 day                      Jo Mcgowan PA-C  OB/GYN  6/19/2024, 11:33 AM

## 2024-06-19 NOTE — ASSESSMENT & PLAN NOTE
Cephalic by ultrasound  Clinical EFW by Leopold's: 98%ile  GBS prophylaxis not indicated  Cervical exam: 0.5/0/-5. Plan for observation of possible early labor.

## 2024-06-19 NOTE — TELEPHONE ENCOUNTER
"Communciated with on call who advised that decreased or change in fetal movement is typical with advanced gestational age. Offered to come to L&D for triage/NST/FRANCI but patient declined for now and will monitor at home, call back if she changes her mind. Per on call also okay to take prednisone 10 mg x5 days. Relayed to patient who verbalized understanding. Call back precautions given for consistent contractions, vaginal bleeding, LOF. Pt verbalized understanding.     Reason for Disposition   [1] Pregnant 23 or more weeks AND [2] normal kick count BUT [3] mother still thinks there is something wrong    Answer Assessment - Initial Assessment Questions  1. FETAL MOVEMENT: \"Has the baby's movement decreased or changed significantly from normal?\" (e.g., yes, no; describe)      yes     Less frequent, more gentle    2. CLAUDIA: \"What date are you expecting to deliver?\"       37w5d    3. PREGNANCY: \"How many weeks pregnant are you?\"       37w5d    4. OTHER SYMPTOMS: \"Do you have any other symptoms?\" (e.g., abdominal pain, leaking fluid from vagina, vaginal bleeding, etc.)      No contractions, LOF or bleeding    Still getting kick count threshhold but is less than typical and movements feel more gentle.    Also received rx for prednisone 10mg x5 days for hives on the back of her arm, wondering if that's okay to take.    Protocols used: Pregnancy - Decreased or Abnormal Fetal Movement-ADULT-    "

## 2024-06-19 NOTE — TELEPHONE ENCOUNTER
"Regardin weeks pregnant / Hives / Question and concerns  ----- Message from Marcie MILLIGAN sent at 2024  7:47 PM EDT -----  \"I am 38 weeks pregnant and I was Dx with hives @ urgent care gave me a steroid . I just wanted to make sure that it was ok to take \"    "

## 2024-06-19 NOTE — PLAN OF CARE
Problem: ANTEPARTUM  Goal: Maintain pregnancy as long as maternal and/or fetal condition is stable  Description: INTERVENTIONS:  - Maternal surveillance  - Fetal surveillance  - Monitor uterine activity  - Medications as ordered  - Bedrest  Outcome: Progressing     Problem: PAIN - ADULT  Goal: Verbalizes/displays adequate comfort level or baseline comfort level  Description: Interventions:  - Encourage patient to monitor pain and request assistance  - Assess pain using appropriate pain scale  - Administer analgesics based on type and severity of pain and evaluate response  - Implement non-pharmacological measures as appropriate and evaluate response  - Consider cultural and social influences on pain and pain management  - Notify physician/advanced practitioner if interventions unsuccessful or patient reports new pain  Outcome: Progressing     Problem: INFECTION - ADULT  Goal: Absence or prevention of progression during hospitalization  Description: INTERVENTIONS:  - Assess and monitor for signs and symptoms of infection  - Monitor lab/diagnostic results  - Monitor all insertion sites, i.e. indwelling lines, tubes, and drains  - Monitor endotracheal if appropriate and nasal secretions for changes in amount and color  - [place] appropriate cooling/warming therapies per order  - Administer medications as ordered  - Instruct and encourage patient and family to use good hand hygiene technique  - Identify and instruct in appropriate isolation precautions for identified infection/condition  Outcome: Progressing  Goal: Absence of fever/infection during neutropenic period  Description: INTERVENTIONS:  - Monitor WBC    Outcome: Progressing     Problem: SAFETY ADULT  Goal: Patient will remain free of falls  Description: INTERVENTIONS:  - Educate patient/family on patient safety including physical limitations  - Instruct patient to call for assistance with activity   - Consult OT/PT to assist with strengthening/mobility   -  Keep Call bell within reach  - Keep bed low and locked with side rails adjusted as appropriate  - Keep care items and personal belongings within reach  - Initiate and maintain comfort rounds  - Apply yellow socks and bracelet for high fall risk patients  - Consider moving patient to room near nurses station  Outcome: Progressing  Goal: Maintain or return to baseline ADL function  Description: INTERVENTIONS:  -  Assess patient's ability to carry out ADLs; assess patient's baseline for ADL function and identify physical deficits which impact ability to perform ADLs (bathing, care of mouth/teeth, toileting, grooming, dressing, etc.)  - Assess/evaluate cause of self-care deficits   - Assess range of motion  - Assess patient's mobility; develop plan if impaired  - Assess patient's need for assistive devices and provide as appropriate  - Encourage maximum independence but intervene and supervise when necessary  - Involve family in performance of ADLs  - Assess for home care needs following discharge   - Consider OT consult to assist with ADL evaluation and planning for discharge  - Provide patient education as appropriate  Outcome: Progressing  Goal: Maintains/Returns to pre admission functional level  Description: INTERVENTIONS:  - Perform AM-PAC 6 Click Basic Mobility/ Daily Activity assessment daily.  - Set and communicate daily mobility goal to care team and patient/family/caregiver.   - Out of bed for toileting  - Record patient progress and toleration of activity level   Outcome: Progressing     Problem: Knowledge Deficit  Goal: Patient/family/caregiver demonstrates understanding of disease process, treatment plan, medications, and discharge instructions  Description: Complete learning assessment and assess knowledge base.  Interventions:  - Provide teaching at level of understanding  - Provide teaching via preferred learning methods  Outcome: Progressing     Problem: DISCHARGE PLANNING  Goal: Discharge to home or  other facility with appropriate resources  Description: INTERVENTIONS:  - Identify barriers to discharge w/patient and caregiver  - Arrange for needed discharge resources and transportation as appropriate  - Identify discharge learning needs (meds, wound care, etc.)  - Arrange for interpretive services to assist at discharge as needed  - Refer to Case Management Department for coordinating discharge planning if the patient needs post-hospital services based on physician/advanced practitioner order or complex needs related to functional status, cognitive ability, or social support system  Outcome: Progressing

## 2024-06-19 NOTE — TELEPHONE ENCOUNTER
" 37 6/7 weeks EDC 24  Patient reports fall last night, went to sit in chair and missed seat, hit left side butt, hip and knee, did not think she hit front of belly. Felt baby move last night. Today has decreased movements and feeling belly discomfort. Denies contraction, denies leaking fluid, some vaginal discharge after last exam in office.  Advised patient to go to triage for monitoring. Patient verbalized understanding, headed to [facility] within 30 minutes.  ESC sent to Dr Hatch- agreed with triage for extended monitoring.   ESC sent to [facility] charge nurse     Reason for Disposition   Pregnant 20 or more weeks and fall to ground or floor (e.g., walking and tripped)    Answer Assessment - Initial Assessment Questions  1. MECHANISM: \"How did the fall happen?\"      Fell at home on carpet, trying to sit in chair and left side hit ground landed on butt and hip area and knee,  2. DOMESTIC VIOLENCE SCREENING: \"Did you fall because someone pushed you or tried to hurt you?\"      Denies   3. ONSET: \"When did the fall happen?\" (e.g., minutes, hours, or days ago)      Last night about 11pm  4. LOCATION: \"What part of the body hit the ground?\" (e.g., back, buttocks, head, hips, knees, hands, head, stomach)      Left side butt, hip and knee   5. INJURY: \"Did you get hurt when you fell? Are there any obvious injuries?\" If Yes, ask: \"What does the injury look like?\"      denies  6. PAIN: \"Is there any pain?\" If Yes, ask: \"How bad is the pain?\" (e.g., Scale 1-10; or mild,   moderate, severe)    - NONE (0): no pain    - MILD (1-3): doesn't interfere with normal activities     - MODERATE (4-7): interferes with normal activities or awakens from sleep     - SEVERE (8-10): excruciating pain, unable to do any normal activities       Denies pain, belly feels sore with position changes  7. SIZE: For cuts, bruises, or swelling, ask: \"How large is it?\" (e.g., inches or centimeters)      denies  8. CLAUDIA: \"What date are you " "expecting to deliver?\"      7/4/24  9. FETAL MOVEMENT: \"Has the baby's movement decreased or changed significantly from   normal?\"      Decreased fetal movements, 5 movements since 6:15  10. OTHER SYMPTOMS: \"Do you have any other symptoms?\" (e.g., stomach pain, contractions, vaginal bleeding, leaking of fluid from vagina)        Denies contractions, some discharge from past vaginal exam    Protocols used: Pregnancy - Fall-ADULT-[place]    "

## 2024-06-19 NOTE — PROGRESS NOTES
FRANCI was performed.  Q1: 4.91  Q2: 4.53  Q3: 2.52  Q4: 1.11  FRANCI: 13.07    Jo Mcgowan PA-C  OB/GYN

## 2024-06-19 NOTE — PROGRESS NOTES
FHT has been reactive with contractions q2-3min. Patient reports feeling them slightly more than before, however they are not painful.     SVE performed and unchanged from prior examination. She denies loss of fluid and vaginal bleeding. Patient to remain in observation.     D/w Dr. Holden Shetty MD  Obstetrics & Gynecology, PGY-2

## 2024-06-19 NOTE — LETTER
[facility]  [address] [facility] [address]  Dept: [phone]    June 20, 2024     Patient: Elisabet Oropeza   YOB: 1995   Date of Visit: 6/19/2024       To Whom it May Concern:    Elisabet Oropeza is under my professional care. She was seen in the hospital from 6/18/24 to 06/20/24. Please excuse her for this time.  If you have any questions or concerns, please don't hesitate to call.         Sincerely,          Suha Irwin MD

## 2024-06-19 NOTE — TELEPHONE ENCOUNTER
Regarding: ob decrease fetal movement  ----- Message from Vivi MCCLAIN sent at 6/19/2024  7:44 AM EDT -----  Pt called. Pt is 37w6d pregnant. Pt states last night she fell on her side and woke up feeling her stomach a bit tight and decreased fetal movement. Warm transfer to [facility], no answer. Pt made aware of message sent.

## 2024-06-20 VITALS
OXYGEN SATURATION: 97 % | RESPIRATION RATE: 18 BRPM | DIASTOLIC BLOOD PRESSURE: 65 MMHG | BODY MASS INDEX: 41.99 KG/M2 | WEIGHT: 252 LBS | HEART RATE: 75 BPM | HEIGHT: 65 IN | TEMPERATURE: 98.4 F | SYSTOLIC BLOOD PRESSURE: 113 MMHG

## 2024-06-20 PROCEDURE — 99231 SBSQ HOSP IP/OBS SF/LOW 25: CPT | Performed by: OBSTETRICS & GYNECOLOGY

## 2024-06-20 RX ORDER — CALCIUM CARBONATE 500 MG/1
1000 TABLET, CHEWABLE ORAL 3 TIMES DAILY PRN
Status: ON HOLD
Start: 2024-06-20 | End: 2024-06-25

## 2024-06-20 RX ORDER — ACETAMINOPHEN 325 MG/1
975 TABLET ORAL EVERY 8 HOURS PRN
Status: ON HOLD
Start: 2024-06-20 | End: 2024-06-25

## 2024-06-20 RX ORDER — PREDNISONE 10 MG/1
10 TABLET ORAL DAILY
Qty: 5 TABLET | Refills: 0 | Status: ON HOLD | OUTPATIENT
Start: 2024-06-20 | End: 2024-06-23

## 2024-06-20 RX ADMIN — FAMOTIDINE 20 MG: 20 TABLET, FILM COATED ORAL at 09:03

## 2024-06-20 NOTE — PROGRESS NOTES
OBGYN Progress Note - Antepartum  Elisabet Oropeza 29 y.o. female MRN: [id number]  Unit/Bed#:  332-01 Encounter: [id number]    Assessment/Plan:  29 y.o.  at 38w0d admitted with for monitoring in the setting of contractions s/p fall. Hospital day 2. By problem:    Fall from chair  Assessment & Plan  Overnight observation.  CBC, coags, Type & screen, and RPR wnl    Decreased fetal movements in third trimester  Assessment & Plan  Cephalic by ultrasound  Clinical EFW by Leopold's: 98%ile  GBS prophylaxis not indicated  Cervical exam: 0.5/0/-5. Plan for observation of possible early labor.      * Uterine contractions  Assessment & Plan  S/p 1L LR  On continuous fetal monitoring  SVE if clinically indicated  Diet: Regular          Subjective:   Patient reports feeling well overnight. Contractions had spaced out and decreased in intensity. She states that have come back this morning, however they are still not very strong. She denies vaginal bleeding, loss of fluid, and endorses good fetal movement.     Objective:   Vitals:   Temp:  [97.5 °F (36.4 °C)-98.8 °F (37.1 °C)] 97.5 °F (36.4 °C)  HR:  [70-75] 70  Resp:  [18-20] 20  BP: (104-136)/(68-85) 112/72     Physical Exam  Constitutional:       General: She is not in acute distress.     Appearance: Normal appearance. She is not ill-appearing.   Cardiovascular:      Rate and Rhythm: Normal rate.   Pulmonary:      Effort: Pulmonary effort is normal. No respiratory distress.   Chest:      Chest wall: No tenderness.   Abdominal:      General: There is distension.      Palpations: Abdomen is soft.      Tenderness: There is no abdominal tenderness. There is no guarding or rebound.   Genitourinary:     Comments: Deferred  Skin:     General: Skin is warm and dry.   Neurological:      Mental Status: She is alert.         Fetal Assessment:  FHT: 125bpm/moderate variability/15x15 accelerations/no decelerations; reactive  [place]: contractions q3-7min    Lab, Imaging and other  studies: I have personally reviewed pertinent reports.      Lab Results   Component Value Date    WBC 10.39 (H) 06/19/2024    HGB 12.8 06/19/2024    HCT 39.4 06/19/2024    MCV 95 06/19/2024     06/19/2024     06/19/2024       Lab Results   Component Value Date    GLUCOSE 118 05/02/2014    CALCIUM 8.5 02/01/2024     05/02/2014    K 3.7 02/01/2024    CO2 20 (L) 02/01/2024     02/01/2024    BUN 8 02/01/2024    CREATININE 0.51 (L) 02/01/2024       Lab Results   Component Value Date    ALT 8 02/01/2024    AST 10 (L) 02/01/2024    ALKPHOS 51 02/01/2024     Chela Shetty MD  Obstetrics & Gynecology, PGY-2  6/20/2024, 6:17 AM

## 2024-06-20 NOTE — PLAN OF CARE
Problem: ANTEPARTUM  Goal: Maintain pregnancy as long as maternal and/or fetal condition is stable  Description: INTERVENTIONS:  - Maternal surveillance  - Fetal surveillance  - Monitor uterine activity  - Medications as ordered  - Bedrest  Outcome: Adequate for Discharge     Problem: PAIN - ADULT  Goal: Verbalizes/displays adequate comfort level or baseline comfort level  Description: Interventions:  - Encourage patient to monitor pain and request assistance  - Assess pain using appropriate pain scale  - Administer analgesics based on type and severity of pain and evaluate response  - Implement non-pharmacological measures as appropriate and evaluate response  - Consider cultural and social influences on pain and pain management  - Notify physician/advanced practitioner if interventions unsuccessful or patient reports new pain  Outcome: Adequate for Discharge     Problem: INFECTION - ADULT  Goal: Absence or prevention of progression during hospitalization  Description: INTERVENTIONS:  - Assess and monitor for signs and symptoms of infection  - Monitor lab/diagnostic results  - Monitor all insertion sites, i.e. indwelling lines, tubes, and drains  - Monitor endotracheal if appropriate and nasal secretions for changes in amount and color  - [place] appropriate cooling/warming therapies per order  - Administer medications as ordered  - Instruct and encourage patient and family to use good hand hygiene technique  - Identify and instruct in appropriate isolation precautions for identified infection/condition  Outcome: Adequate for Discharge  Goal: Absence of fever/infection during neutropenic period  Description: INTERVENTIONS:  - Monitor WBC    Outcome: Adequate for Discharge     Problem: SAFETY ADULT  Goal: Patient will remain free of falls  Description: INTERVENTIONS:  - Educate patient/family on patient safety including physical limitations  - Instruct patient to call for assistance with activity   - Consult  OT/PT to assist with strengthening/mobility   - Keep Call bell within reach  - Keep bed low and locked with side rails adjusted as appropriate  - Keep care items and personal belongings within reach  - Initiate and maintain comfort rounds  - Make Fall Risk Sign visible to staff    Outcome: Adequate for Discharge  Goal: Maintain or return to baseline ADL function  Description: INTERVENTIONS:  -  Assess patient's ability to carry out ADLs; assess patient's baseline for ADL function and identify physical deficits which impact ability to perform ADLs (bathing, care of mouth/teeth, toileting, grooming, dressing, etc.)  - Assess/evaluate cause of self-care deficits   - Assess range of motion  - Assess patient's mobility; develop plan if impaired  - Assess patient's need for assistive devices and provide as appropriate  - Encourage maximum independence but intervene and supervise when necessary  - Involve family in performance of ADLs  - Assess for home care needs following discharge   - Consider OT consult to assist with ADL evaluation and planning for discharge  - Provide patient education as appropriate  Outcome: Adequate for Discharge  Goal: Maintains/Returns to pre admission functional level  Description: INTERVENTIONS:  - Perform AM-PAC 6 Click Basic Mobility/ Daily Activity assessment daily.  - Set and communicate daily mobility goal to care team and patient/family/caregiver.   - Collaborate with rehabilitation services on mobility goals if consulted  -  - Out of bed for toileting  - Record patient progress and toleration of activity level   Outcome: Adequate for Discharge     Problem: Knowledge Deficit  Goal: Patient/family/caregiver demonstrates understanding of disease process, treatment plan, medications, and discharge instructions  Description: Complete learning assessment and assess knowledge base.  Interventions:  - Provide teaching at level of understanding  - Provide teaching via preferred learning  methods  Outcome: Adequate for Discharge     Problem: DISCHARGE PLANNING  Goal: Discharge to home or other facility with appropriate resources  Description: INTERVENTIONS:  - Identify barriers to discharge w/patient and caregiver  - Arrange for needed discharge resources and transportation as appropriate  - Identify discharge learning needs (meds, wound care, etc.)  - Arrange for interpretive services to assist at discharge as needed  - Refer to Case Management Department for coordinating discharge planning if the patient needs post-hospital services based on physician/advanced practitioner order or complex needs related to functional status, cognitive ability, or social support system  Outcome: Adequate for Discharge

## 2024-06-20 NOTE — PLAN OF CARE
Problem: ANTEPARTUM  Goal: Maintain pregnancy as long as maternal and/or fetal condition is stable  Description: INTERVENTIONS:  - Maternal surveillance  - Fetal surveillance  - Monitor uterine activity  - Medications as ordered  - Bedrest  Outcome: Progressing     Problem: PAIN - ADULT  Goal: Verbalizes/displays adequate comfort level or baseline comfort level  Description: Interventions:  - Encourage patient to monitor pain and request assistance  - Assess pain using appropriate pain scale  - Administer analgesics based on type and severity of pain and evaluate response  - Implement non-pharmacological measures as appropriate and evaluate response  - Consider cultural and social influences on pain and pain management  - Notify physician/advanced practitioner if interventions unsuccessful or patient reports new pain  Outcome: Progressing     Problem: INFECTION - ADULT  Goal: Absence or prevention of progression during hospitalization  Description: INTERVENTIONS:  - Assess and monitor for signs and symptoms of infection  - Monitor lab/diagnostic results  - Monitor all insertion sites, i.e. indwelling lines, tubes, and drains  - Monitor endotracheal if appropriate and nasal secretions for changes in amount and color  - Congress appropriate cooling/warming therapies per order  - Administer medications as ordered  - Instruct and encourage patient and family to use good hand hygiene technique  - Identify and instruct in appropriate isolation precautions for identified infection/condition  Outcome: Progressing  Goal: Absence of fever/infection during neutropenic period  Description: INTERVENTIONS:  - Monitor WBC    Outcome: Progressing     Problem: SAFETY ADULT  Goal: Patient will remain free of falls  Description: INTERVENTIONS:  - Educate patient/family on patient safety including physical limitations  - Instruct patient to call for assistance with activity   - Consult OT/PT to assist with strengthening/mobility   -  Keep Call bell within reach  - Keep bed low and locked with side rails adjusted as appropriate  - Keep care items and personal belongings within reach  - Initiate and maintain comfort rounds  - Make Fall Risk Sign visible to staff  - Apply yellow socks and bracelet for high fall risk patients  - Consider moving patient to room near nurses station  Outcome: Progressing  Goal: Maintain or return to baseline ADL function  Description: INTERVENTIONS:  -  Assess patient's ability to carry out ADLs; assess patient's baseline for ADL function and identify physical deficits which impact ability to perform ADLs (bathing, care of mouth/teeth, toileting, grooming, dressing, etc.)  - Assess/evaluate cause of self-care deficits   - Assess range of motion  - Assess patient's mobility; develop plan if impaired  - Assess patient's need for assistive devices and provide as appropriate  - Encourage maximum independence but intervene and supervise when necessary  - Involve family in performance of ADLs  - Assess for home care needs following discharge   - Consider OT consult to assist with ADL evaluation and planning for discharge  - Provide patient education as appropriate  Outcome: Progressing  Goal: Maintains/Returns to pre admission functional level  Description: INTERVENTIONS:  - Perform AM-PAC 6 Click Basic Mobility/ Daily Activity assessment daily.  - Set and communicate daily mobility goal to care team and patient/family/caregiver.   - Collaborate with rehabilitation services on mobility goals if consulted  - Out of bed for toileting  - Record patient progress and toleration of activity level   Outcome: Progressing     Problem: Knowledge Deficit  Goal: Patient/family/caregiver demonstrates understanding of disease process, treatment plan, medications, and discharge instructions  Description: Complete learning assessment and assess knowledge base.  Interventions:  - Provide teaching at level of understanding  - Provide teaching  via preferred learning methods  Outcome: Progressing     Problem: DISCHARGE PLANNING  Goal: Discharge to home or other facility with appropriate resources  Description: INTERVENTIONS:  - Identify barriers to discharge w/patient and caregiver  - Arrange for needed discharge resources and transportation as appropriate  - Identify discharge learning needs (meds, wound care, etc.)  - Arrange for interpretive services to assist at discharge as needed  - Refer to Case Management Department for coordinating discharge planning if the patient needs post-hospital services based on physician/advanced practitioner order or complex needs related to functional status, cognitive ability, or social support system  Outcome: Progressing

## 2024-06-21 ENCOUNTER — TELEPHONE (OUTPATIENT)
Dept: OBGYN CLINIC | Facility: CLINIC | Age: 29
End: 2024-06-21

## 2024-06-21 ENCOUNTER — NURSE TRIAGE (OUTPATIENT)
Age: 29
End: 2024-06-21

## 2024-06-21 ENCOUNTER — TRANSITIONAL CARE MANAGEMENT (OUTPATIENT)
Dept: FAMILY MEDICINE CLINIC | Facility: CLINIC | Age: 29
End: 2024-06-21

## 2024-06-21 ENCOUNTER — HOSPITAL ENCOUNTER (OUTPATIENT)
Facility: HOSPITAL | Age: 29
Discharge: HOME | End: 2024-06-21
Attending: STUDENT IN AN ORGANIZED HEALTH CARE EDUCATION/TRAINING PROGRAM | Admitting: STUDENT IN AN ORGANIZED HEALTH CARE EDUCATION/TRAINING PROGRAM
Payer: COMMERCIAL

## 2024-06-21 ENCOUNTER — TELEPHONE (OUTPATIENT)
Age: 29
End: 2024-06-21

## 2024-06-21 VITALS
WEIGHT: 250 LBS | BODY MASS INDEX: 41.65 KG/M2 | DIASTOLIC BLOOD PRESSURE: 77 MMHG | HEIGHT: 65 IN | HEART RATE: 89 BPM | SYSTOLIC BLOOD PRESSURE: 135 MMHG | TEMPERATURE: 98 F | RESPIRATION RATE: 18 BRPM | OXYGEN SATURATION: 98 %

## 2024-06-21 PROBLEM — Z3A.38 38 WEEKS GESTATION OF PREGNANCY: Status: ACTIVE | Noted: 2024-02-20

## 2024-06-21 PROCEDURE — 99214 OFFICE O/P EST MOD 30 MIN: CPT

## 2024-06-21 PROCEDURE — 99213 OFFICE O/P EST LOW 20 MIN: CPT | Performed by: STUDENT IN AN ORGANIZED HEALTH CARE EDUCATION/TRAINING PROGRAM

## 2024-06-21 PROCEDURE — 76815 OB US LIMITED FETUS(S): CPT | Performed by: STUDENT IN AN ORGANIZED HEALTH CARE EDUCATION/TRAINING PROGRAM

## 2024-06-21 PROCEDURE — 76815 OB US LIMITED FETUS(S): CPT

## 2024-06-21 NOTE — TELEPHONE ENCOUNTER
Called [facility] (spoke with Abi) - scheduled EIOL for 6/26/2026 (8:00 pm ripening) - 6/27/2024.  patient informed with instructions given.  Pink sticky note updated.  Staff message sent to Dr Irwin ( on call both days - 6/27/2024 until 5 pm).

## 2024-06-21 NOTE — TELEPHONE ENCOUNTER
"Spoke with patient who is 38w1d, . She reports she feels like she has had a contraction for the last 15 mins that is not letting up. Rates it a 2/10.  She reports good fetal movement today until this happened. Has not felt any since.  Denies vaginal bleeding/LOF.  Pt reports not great intake of water today.    Discussed with on call provider.  Recommendation to hydrate, eat a snack and rest, try to relax abdomen.  If abd does not relax or no fetal movement after those recommendations, call back to be evaluated in L&D. Patient understood, had no further questions or concerns at this time.  She was also advised to call back immediately if vaginal bleeding/LOF.    Reason for Disposition  • First baby (primipara) with contractions > 5 minutes apart, or contractions present < 2 hours    Answer Assessment - Initial Assessment Questions  1. ONSET: \"When did the symptoms begin?\"         15 mins  2. CONTRACTIONS: \"Describe the contractions that you are having.\" (e.g., duration, frequency, regularity, severity)      Constant contraction, not letting up  3. CLAUDIA: \"What date are you expecting to deliver?\"      24  4. PARITY: \"Have you had a baby before?\" If Yes, ask: \"How long did the labor last?\"      no  5. FETAL MOVEMENT: \"Has the baby's movement decreased or changed significantly from normal?\"      Not in the last 15 mins since this started, but prior that had good movement today.  6. OTHER SYMPTOMS: \"Do you have any other symptoms?\" (e.g., leaking fluid from vagina, vaginal bleeding, fever, hand/facial swelling)      denies    Protocols used: Pregnancy - Labor-ADULT-[place]    "
Called to check in  Still experiencing long contractions  Still noticing decreased fetal movement, <10 kicks/2 hours  Recommend she presents to triage, which she is amenable to  Notified L&D team      
144.78

## 2024-06-21 NOTE — TELEPHONE ENCOUNTER
Pt called and requested to give FMLA paperwork to her - who will pick it up at [facility] on Monday 06/24 in the morning/ or pt can pick it up today at the office in [place].

## 2024-06-22 NOTE — PROGRESS NOTES
L&D Triage Note - OB/GYN  Elisabet Oropeza 29 y.o. female MRN: [id number]  Unit/Bed#:  TRIAGE  Encounter: [id number]      ASSESSMENT:    Elisabet Oropeza is a 29 y.o.  at 38w1d who presented with decreased fetal movement and abdominal tightening. Abdominal tightening likely fetal position in RUQ with pressure on ribs. Patient endorsed fetal movement in triage and FRANCI normal. Patient reassured.     PLAN:    1) Decreased fetal movement  FRANCI: 13.49cm  FHT reactive  Patient reports normal fetal movement in triage and was advised to closely monitor and continue kick counts.     2) Continue routine prenatal care  3) Discharge from OB triage with term labor precautions    - Reviewed rupture of membranes, false vs true labor, decreased fetal movement, and vaginal bleeding   - Pt to call provider with any concerns and follow up at her next scheduled prenatal appointment 2024   - Case discussed with Dr. Pritchett    SUBJECTIVE:    Elisabet Oropeza 29 y.o.  at 38w1d with an Estimated Date of Delivery: 24 who presents with decreased fetal movement and abdominal tightening. She reports feeling a fullness near her ribs, and due to the tightening, has had difficulty feeling fetal movement. Since sitting in triage, she has felt fetal movement with the abdominal band on. She denies regular contractions, loss of fluid, and vaginal bleeding.     Her current obstetrical history is significant for elevated BP wo diagnosis of HTN, velamentous insertion of umbilical cord, TONEY    OBJECTIVE:    Vitals:    24 1900   BP: 135/77   Pulse: 89   Resp: 18   Temp: 98 °F (36.7 °C)   SpO2: 98%       ROS:  Constitutional: Negative for fevers, chills, headaches, vision changes  Respiratory: Negative for shortness of breath, cough  Cardiovascular: Negative for chest pain, palpitations, lower extremity edema    Gastrointestinal: Negative for nausea, vomiting, diarrhea, constipation  :  Negative for dysuria,  hematuria  EXTR:  Negative for rash, new myalgias/arthralgias, joint swelling    General Physical Exam:  General: in no apparent distress  Cardiovascular: Cor RRR  Lungs: non-labored breathing  Abdomen: abdomen is soft without significant tenderness, masses, organomegaly or guarding  Lower extremeties: nontender    Cervical Exam  SVE: 0.5 / 50% / -5    Fetal monitoring:  FHT:  130 bpm/ Moderate 6 - 25 bpm / 15 x 15 accelerations present, no decelerations  Patrick: contractions q4-5min     Imaging:      Abd. US   FRANCI      - Q1 3.09cm     - Q2 2.23cm     - Q3 7.22cm     - Q4 1.07cm     - Total: 13.6cm   Placenta: anterior   Presentation: cephalic    Chela Shetty MD,  OBGYN PGY-2  6/21/2024 8:09 PM

## 2024-06-22 NOTE — PROCEDURES
Elisabet MICK Castellanosdar, a  at 38w1d with an CLAUDIA of 2024, by Ultrasound, was seen at [facility] AND DELIVERY for the following procedure(s): $Procedure Type: FRANCI]         4 Quadrant FRANCI  FRANCI Q1 (cm): 3.1 cm  FRANCI Q2 (cm): 2.2 cm  FRANCI Q3 (cm): 7.2 cm  FRANCI Q4 (cm): 1.1 cm  FRANCI TOTAL (cm): 13.6 cm                       Chela Shetty MD  Obstetrics & Gynecology, PGY-2

## 2024-06-23 ENCOUNTER — HOSPITAL ENCOUNTER (OUTPATIENT)
Facility: HOSPITAL | Age: 29
Discharge: HOME | End: 2024-06-23
Attending: OBSTETRICS & GYNECOLOGY | Admitting: OBSTETRICS & GYNECOLOGY
Payer: COMMERCIAL

## 2024-06-23 ENCOUNTER — NURSE TRIAGE (OUTPATIENT)
Dept: OTHER | Facility: OTHER | Age: 29
End: 2024-06-23

## 2024-06-23 VITALS
OXYGEN SATURATION: 98 % | HEART RATE: 76 BPM | SYSTOLIC BLOOD PRESSURE: 136 MMHG | TEMPERATURE: 97.9 F | DIASTOLIC BLOOD PRESSURE: 87 MMHG | HEIGHT: 65 IN | BODY MASS INDEX: 41.6 KG/M2 | RESPIRATION RATE: 18 BRPM

## 2024-06-23 PROBLEM — N89.8 VAGINAL DISCHARGE DURING PREGNANCY: Status: ACTIVE | Noted: 2024-06-23

## 2024-06-23 PROBLEM — O26.899 VAGINAL DISCHARGE DURING PREGNANCY: Status: ACTIVE | Noted: 2024-06-23

## 2024-06-23 PROCEDURE — 99214 OFFICE O/P EST MOD 30 MIN: CPT

## 2024-06-23 PROCEDURE — NC001 PR NO CHARGE: Performed by: OBSTETRICS & GYNECOLOGY

## 2024-06-24 ENCOUNTER — NURSE TRIAGE (OUTPATIENT)
Age: 29
End: 2024-06-24

## 2024-06-24 ENCOUNTER — HOSPITAL ENCOUNTER (OUTPATIENT)
Facility: HOSPITAL | Age: 29
Discharge: HOME | End: 2024-06-24
Attending: STUDENT IN AN ORGANIZED HEALTH CARE EDUCATION/TRAINING PROGRAM | Admitting: STUDENT IN AN ORGANIZED HEALTH CARE EDUCATION/TRAINING PROGRAM
Payer: COMMERCIAL

## 2024-06-24 VITALS
RESPIRATION RATE: 18 BRPM | HEIGHT: 65 IN | TEMPERATURE: 98.8 F | DIASTOLIC BLOOD PRESSURE: 67 MMHG | SYSTOLIC BLOOD PRESSURE: 118 MMHG | BODY MASS INDEX: 41.65 KG/M2 | WEIGHT: 250 LBS | HEART RATE: 66 BPM
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
## 2024-06-24 PROBLEM — N89.8 VAGINAL DISCHARGE DURING PREGNANCY: Status: RESOLVED | Noted: 2024-06-23 | Resolved: 2024-06-24

## 2024-06-24 PROBLEM — O26.899 VAGINAL DISCHARGE DURING PREGNANCY: Status: RESOLVED | Noted: 2024-06-23 | Resolved: 2024-06-24

## 2024-06-24 PROCEDURE — NC001 PR NO CHARGE: Performed by: STUDENT IN AN ORGANIZED HEALTH CARE EDUCATION/TRAINING PROGRAM

## 2024-06-24 PROCEDURE — 99214 OFFICE O/P EST MOD 30 MIN: CPT

## 2024-06-24 NOTE — PROGRESS NOTES
Patient rechecked after 2 hours. States she is still celestino, feels that they are getting stronger. SVE unchanged (1.5/60/-3). [place] with q2-3 regular contractions. NST reactive. Discussed option for therapeutic rest vs going home to try warm shower/resting in her own bed. Patient opted to go home and will return if contractions worsen or she experiences vaginal bleeding, loss of fluid, decreased fetal movement.     D/w Dr. Hatch.     Sari Warren MD  PGY-1 OBGYN

## 2024-06-24 NOTE — DISCHARGE INSTR - AVS FIRST PAGE
Vaginal spotting is normal after today's examination.  Please call or return if you experience a gush of watery fluid, heavy vaginal bleeding, regular painful contractions that are less than 10 minutes apart for 2 hours, or baby not meeting kick counts.  Please follow-up with your routinely scheduled prenatal appointments.

## 2024-06-24 NOTE — TELEPHONE ENCOUNTER
"Patient calling 38w4d  reporting having over 6-7 contractions per hour for the last 2 hours or more that are lasting 30-40 seconds each.  Patient denies any bleeding and confirms +FM.  She denies any LOF.  Advised patient to report to the L&D unit at [facility] to be further evaluated.  Patient verbalized understanding and voiced appreciation for phone call.       ESC message sent to on call provider, Dr. Hatch and to the [place] charge nurse to make aware of patient's arrival.       Reason for Disposition   First baby (primipara) with contractions < 6 minutes apart, and present 2 hours    Answer Assessment - Initial Assessment Questions  1. ONSET: \"When did the symptoms begin?\"         5am this morning   2. CONTRACTIONS: \"Describe the contractions that you are having.\" (e.g., duration, frequency, regularity, severity)      >6 per hour and lasting for 30-40 seconds   3. CLAUDIA: \"What date are you expecting to deliver?\"      24  4. PARITY: \"Have you had a baby before?\" If Yes, ask: \"How long did the labor last?\"        5. FETAL MOVEMENT: \"Has the baby's movement decreased or changed significantly from normal?\"      +FM   6. OTHER SYMPTOMS: \"Do you have any other symptoms?\" (e.g., leaking fluid from vagina, vaginal bleeding, fever, hand/facial swelling)      Denies    Protocols used: Pregnancy - Labor-ADULT-[place]    "

## 2024-06-24 NOTE — PROGRESS NOTES
L&D Triage Note - OB/GYN  Elisabet Oropeza 29 y.o. female MRN: [id number]  Unit/Bed#: LD TRIAGE  Encounter: [id number]        Patient is seen by [facility]     ASSESSMENT/PLAN  Elisabet Oropeza is a 29 y.o.  at 38w3d here for rule out labor after she lost her mucus plug. AVSS. Nst reactive. Irregular contractions on toco. SVE unchanged from previous. Patient is stable for discharge home. Strict return precautions reviewed.       1) Rule out labor     SVE:  Cervical Dilation: Fingertip  Cervical Effacement: 50  Cervical Consistency: Medium  Fetal Station: Ballotable  Presentation: Vertex  Position: Unknown  Method: Manual  OB Examiner: Italia    FHT:  Baseline Rate (FHR): 140 bpm  Variability: Moderate  Accelerations: 15 x 15 or greater, At variable times  Decelerations: None  FHR Category: Category I    TOCO:   Contraction Frequency (minutes): 3  Contraction Duration (seconds): 100-140  Contraction Intensity: Mild    2)  Discharge instructions  - Patient instructed to call if experiencing worsening contractions, vaginal bleeding, loss of fluid or decreased fetal movement.  - Will follow up with OBGYN in office    D/w Dr. Irwin   ______________    SUBJECTIVE    CLAUDIA: Estimated Date of Delivery: 24    HPI:  29 y.o.  38w3d presents with concerns that she passed her mucous plug.  She is feeling mild irregular contractions.  She has no other concerns at this time.  She denies any watery fluid, just has some passage of mucus.    Contractions: irregular and mild   Leakage of fluid: denies  Vaginal Bleeding: denies  Fetal movement: present    Her obstetrical history is significant for     ROS:  Constitutional: Negative  Respiratory: Negative  Cardiovascular: Negative    Gastrointestinal: Negative    Physical Exam  GEN: Well appearing, no apparent distress   CARDIO: regular rate  RESP: no increased WOB  ABD: Gravid, soft, nontender  SVE: Cervical Dilation: Fingertip  Cervical Effacement:  "50  Cervical Consistency: Medium  Fetal Station: Ballotable  Presentation: Vertex  Position: Unknown  Method: Manual  OB Examiner: Italia    OBJECTIVE:  /87 (Patient Position: Sitting)   Pulse 76   Temp 97.9 °F (36.6 °C) (Oral)   Resp 18   Ht 5' 5\" (1.651 m)   LMP 08/31/2023 (Approximate)   SpO2 98%   BMI 41.60 kg/m²   Body mass index is 41.6 kg/m².  Labs: No results found for this or any previous visit (from the past 24 hour(s)).      Jennifer Echavarria MD  OB/GYN PGY-1  6/24/2024  12:49 AM      Portions of the record may have been created with voice recognition software.  Occasional wrong word or \"sound a like\" substitutions may have occurred due to the inherent limitations of voice recognition software.  Read the chart carefully and recognize, using context, where substitutions have occurred   "

## 2024-06-24 NOTE — TELEPHONE ENCOUNTER
"Answer Assessment - Initial Assessment Questions  1. DISCHARGE: \"Describe the discharge.\" (e.g., white, yellow, green, gray, foamy, cottage cheese-like)      Brown    2. ODOR: \"Is there a bad odor?\"      Denies    3. ONSET: \"When did the discharge begin?\"      Right before calling in    4. RASH: \"Is there a rash in that area?\" If Yes, ask: \"Describe it.\" (e.g., redness, blisters, sores, bumps)      Denies    5. ABDOMINAL PAIN: \"Are you having any abdominal pain?\" If Yes, ask: \"What does it feel like?\" (e.g., crampy, dull, intermittent, constant)       Just crampy, but has not been regular or consistent to be able to measure     6. ABDOMINAL PAIN SEVERITY: If present, ask: \"How bad is it?\"  (e.g., Scale 1-10; mild, moderate, or severe)    - MILD (1-3): doesn't interfere with normal activities, abdomen soft and not tender to touch     - MODERATE (4-7): interferes with normal activities or awakens from sleep, tender to touch     - SEVERE (8-10): excruciating pain, doubled over, unable to do any normal activities      Mild    7. CAUSE: \"What do you think is causing the discharge?\"      Patient believes it's mucus plug    8. OTHER SYMPTOMS: \"Do you have any other symptoms?\" (e.g., fever, itching, vaginal bleeding, pain with urination)      Vaginal discharge    9. CLAUDIA: \"What date are you expecting to deliver?\"       7/4/2024    10. PREGNANCY: \"How many weeks pregnant are you?\"        38w3d gestation    Protocols used: Pregnancy - Vaginal Discharge-ADULT-[facility]    "

## 2024-06-24 NOTE — TELEPHONE ENCOUNTER
Advised patient to go to [facility] for triage. Verbalized understanding. L&D charge RN also notified.

## 2024-06-24 NOTE — TELEPHONE ENCOUNTER
Regardin Weeks pregnant & I think I'm passing my mucus plug  ----- Message from Nichole BARTON sent at 2024  8:44 PM EDT -----  '' I'm 38 weeks pregnant and I think passing my mucus plug.''

## 2024-06-24 NOTE — PROGRESS NOTES
"L&D Triage Note - OB/GYN  Elisabet Oropeza 29 y.o. female MRN: [id number]  Unit/Bed#:  TRIAGE  Encounter: [id number]    Patient is seen by [facility].    ASSESSMENT  Elisabet Oropeza is a 29 y.o.  at 38w4d here for r/o labor. Disposition pending 2hr recheck.    PLAN  #1. Uterine contractions:   SVE 1.5/60/-3, minimal change from exam yesterday  [place] q2-3  Plan for 2hr recheck    #2. Disposition: pending 2hr recheck    D/w Dr. Hatch.  ______________    SUBJECTIVE    CLAUDIA: Estimated Date of Delivery: 24    HPI:  29 y.o.  38w4d presents with complaint of painful uterine contractions. She started noticing them around 0500 but they became more painful and regular \"every few minutes\" at 0800. She is otherwise without complaint.     Contractions: as above  Leakage of fluid: denies  Vaginal Bleeding: denies  Fetal movement: present    Her current pregnancy is significant for velamentous vs marginal cord and possible fetal macrosomia.    Her obstetrical history is significant for nothing.    ROS:  Constitutional: Negative  Respiratory: Negative  Cardiovascular: Negative    Gastrointestinal: Negative    OBJECTIVE:    Vitals:  /67   Pulse 66   Temp 98.8 °F (37.1 °C) (Oral)   Resp 18   Ht 5' 5\" (1.651 m)   Wt 113 kg (250 lb)   LMP 2023 (Approximate)   BMI 41.60 kg/m²   Body mass index is 41.6 kg/m².    Physical Exam  Constitutional:       Appearance: Normal appearance.   Cardiovascular:      Rate and Rhythm: Normal rate.   Abdominal:      Comments: Gravid uterus   Genitourinary:     General: Normal vulva.   Musculoskeletal:         General: Normal range of motion.   Skin:     General: Skin is warm and dry.   Neurological:      General: No focal deficit present.      Mental Status: She is alert and oriented to person, place, and time.       SVE: 1.5/60/-3  Presentation: Vertex  Method: Manual  OB Examiner: Kelvin    FHT:  Baseline Rate (FHR): 130 bpm  Variability: " Moderate  Accelerations: 15 x 15 or greater, At variable times  Decelerations: None    TOCO:   Contraction Frequency (minutes): 3-4  Contraction Duration (seconds): 80-90  Contraction Intensity: Mild    Labs: No results found for this or any previous visit (from the past 24 hour(s)).    Sari Warren MD  6/24/2024  1:11 PM

## 2024-06-25 ENCOUNTER — ANESTHESIA EVENT (INPATIENT)
Dept: LABOR AND DELIVERY | Facility: HOSPITAL | Age: 29
End: 2024-06-25
Payer: COMMERCIAL

## 2024-06-25 ENCOUNTER — NURSE TRIAGE (OUTPATIENT)
Dept: OTHER | Facility: OTHER | Age: 29
End: 2024-06-25

## 2024-06-25 ENCOUNTER — HOSPITAL ENCOUNTER (INPATIENT)
Facility: HOSPITAL | Age: 29
LOS: 3 days | Discharge: HOME | End: 2024-06-28
Attending: STUDENT IN AN ORGANIZED HEALTH CARE EDUCATION/TRAINING PROGRAM | Admitting: STUDENT IN AN ORGANIZED HEALTH CARE EDUCATION/TRAINING PROGRAM
Payer: COMMERCIAL

## 2024-06-25 ENCOUNTER — ANESTHESIA (INPATIENT)
Dept: LABOR AND DELIVERY | Facility: HOSPITAL | Age: 29
End: 2024-06-25
Payer: COMMERCIAL

## 2024-06-25 DIAGNOSIS — Z98.891 STATUS POST PRIMARY LOW TRANSVERSE CESAREAN SECTION: Chronic | ICD-10-CM

## 2024-06-25 DIAGNOSIS — Z3A.38 38 WEEKS GESTATION OF PREGNANCY: Primary | ICD-10-CM

## 2024-06-25 PROBLEM — O36.60X0 FETAL MACROSOMIA DURING PREGNANCY: Status: ACTIVE | Noted: 2024-06-25

## 2024-06-25 PROBLEM — I10 CHRONIC HYPERTENSION: Status: ACTIVE | Noted: 2024-02-20

## 2024-06-25 PROBLEM — O41.00X0 OLIGOHYDRAMNIOS: Status: ACTIVE | Noted: 2024-06-25

## 2024-06-25 LAB
ABO GROUP BLD: NORMAL
ALBUMIN SERPL BCG-MCNC: 3.1 G/DL (ref 3.5–5)
ALP SERPL-CCNC: 154 U/L (ref 34–104)
ALT SERPL W P-5'-P-CCNC: 6 U/L (ref 7–52)
ANION GAP SERPL CALCULATED.3IONS-SCNC: 8 MMOL/L (ref 4–13)
AST SERPL W P-5'-P-CCNC: 10 U/L (ref 13–39)
BILIRUB SERPL-MCNC: 0.36 MG/DL (ref 0.2–1)
BLD GP AB SCN SERPL QL: NEGATIVE
BUN SERPL-MCNC: 8 MG/DL (ref 5–25)
CALCIUM ALBUM COR SERPL-MCNC: 9.3 MG/DL (ref 8.3–10.1)
CALCIUM SERPL-MCNC: 8.6 MG/DL (ref 8.4–10.2)
CHLORIDE SERPL-SCNC: 108 MMOL/L (ref 96–108)
CO2 SERPL-SCNC: 20 MMOL/L (ref 21–32)
CREAT SERPL-MCNC: 0.66 MG/DL (ref 0.6–1.3)
CREAT UR-MCNC: 80.1 MG/DL
ERYTHROCYTE [DISTWIDTH] IN BLOOD BY AUTOMATED COUNT: 16.7 % (ref 11.6–15.1)
GFR SERPL CREATININE-BSD FRML MDRD: 120 ML/MIN/1.73SQ M
GLUCOSE SERPL-MCNC: 122 MG/DL (ref 65–140)
HCT VFR BLD AUTO: 36.2 % (ref 34.8–46.1)
HGB BLD-MCNC: 11.5 G/DL (ref 11.5–15.4)
MCH RBC QN AUTO: 30.8 PG (ref 26.8–34.3)
MCHC RBC AUTO-ENTMCNC: 31.8 G/DL (ref 31.4–37.4)
MCV RBC AUTO: 97 FL (ref 82–98)
PLATELET # BLD AUTO: 168 THOUSANDS/UL (ref 149–390)
PMV BLD AUTO: 11.4 FL (ref 8.9–12.7)
POTASSIUM SERPL-SCNC: 3.7 MMOL/L (ref 3.5–5.3)
PROT SERPL-MCNC: 5.9 G/DL (ref 6.4–8.4)
PROT UR-MCNC: 7 MG/DL
PROT/CREAT UR: 0.09 MG/G{CREAT} (ref 0–0.1)
RBC # BLD AUTO: 3.73 MILLION/UL (ref 3.81–5.12)
RH BLD: POSITIVE
SODIUM SERPL-SCNC: 136 MMOL/L (ref 135–147)
SPECIMEN EXPIRATION DATE: NORMAL
TREPONEMA PALLIDUM IGG+IGM AB [PRESENCE] IN SERUM OR PLASMA BY IMMUNOASSAY: NORMAL
WBC # BLD AUTO: 9.43 THOUSAND/UL (ref 4.31–10.16)

## 2024-06-25 PROCEDURE — 80053 COMPREHEN METABOLIC PANEL: CPT

## 2024-06-25 PROCEDURE — 76815 OB US LIMITED FETUS(S): CPT

## 2024-06-25 PROCEDURE — NC001 PR NO CHARGE: Performed by: STUDENT IN AN ORGANIZED HEALTH CARE EDUCATION/TRAINING PROGRAM

## 2024-06-25 PROCEDURE — 99214 OFFICE O/P EST MOD 30 MIN: CPT

## 2024-06-25 PROCEDURE — 82570 ASSAY OF URINE CREATININE: CPT

## 2024-06-25 PROCEDURE — 76815 OB US LIMITED FETUS(S): CPT | Performed by: STUDENT IN AN ORGANIZED HEALTH CARE EDUCATION/TRAINING PROGRAM

## 2024-06-25 PROCEDURE — 84156 ASSAY OF PROTEIN URINE: CPT

## 2024-06-25 PROCEDURE — 86780 TREPONEMA PALLIDUM: CPT

## 2024-06-25 PROCEDURE — 86901 BLOOD TYPING SEROLOGIC RH(D): CPT

## 2024-06-25 PROCEDURE — 85027 COMPLETE CBC AUTOMATED: CPT

## 2024-06-25 PROCEDURE — 86850 RBC ANTIBODY SCREEN: CPT

## 2024-06-25 PROCEDURE — 86900 BLOOD TYPING SEROLOGIC ABO: CPT

## 2024-06-25 RX ORDER — BUPIVACAINE HYDROCHLORIDE 2.5 MG/ML
30 INJECTION, SOLUTION EPIDURAL; INFILTRATION; INTRACAUDAL ONCE AS NEEDED
Status: DISCONTINUED | OUTPATIENT
Start: 2024-06-25 | End: 2024-06-27

## 2024-06-25 RX ORDER — BUSPIRONE HYDROCHLORIDE 10 MG/1
10 TABLET ORAL 2 TIMES DAILY
Status: DISCONTINUED | OUTPATIENT
Start: 2024-06-25 | End: 2024-06-28 | Stop reason: HOSPADM

## 2024-06-25 RX ORDER — LIDOCAINE HYDROCHLORIDE AND EPINEPHRINE 15; 5 MG/ML; UG/ML
INJECTION, SOLUTION EPIDURAL
Status: COMPLETED | OUTPATIENT
Start: 2024-06-25 | End: 2024-06-25

## 2024-06-25 RX ORDER — PROMETHAZINE HYDROCHLORIDE 25 MG/ML
25 INJECTION, SOLUTION INTRAMUSCULAR; INTRAVENOUS ONCE
Status: COMPLETED | OUTPATIENT
Start: 2024-06-25 | End: 2024-06-25

## 2024-06-25 RX ORDER — SODIUM CHLORIDE, SODIUM LACTATE, POTASSIUM CHLORIDE, CALCIUM CHLORIDE 600; 310; 30; 20 MG/100ML; MG/100ML; MG/100ML; MG/100ML
125 INJECTION, SOLUTION INTRAVENOUS CONTINUOUS
Status: DISCONTINUED | OUTPATIENT
Start: 2024-06-25 | End: 2024-06-28 | Stop reason: HOSPADM

## 2024-06-25 RX ORDER — ACETAMINOPHEN 325 MG/1
975 TABLET ORAL EVERY 8 HOURS PRN
Status: DISCONTINUED | OUTPATIENT
Start: 2024-06-25 | End: 2024-06-27

## 2024-06-25 RX ORDER — OXYTOCIN/RINGER'S LACTATE 30/500 ML
1-30 PLASTIC BAG, INJECTION (ML) INTRAVENOUS
Status: DISCONTINUED | OUTPATIENT
Start: 2024-06-25 | End: 2024-06-26

## 2024-06-25 RX ORDER — CALCIUM CARBONATE 500 MG/1
1000 TABLET, CHEWABLE ORAL 3 TIMES DAILY PRN
Status: DISCONTINUED | OUTPATIENT
Start: 2024-06-25 | End: 2024-06-28 | Stop reason: HOSPADM

## 2024-06-25 RX ORDER — TERBUTALINE SULFATE 1 MG/ML
INJECTION, SOLUTION SUBCUTANEOUS
Status: DISPENSED
Start: 2024-06-25 | End: 2024-06-26

## 2024-06-25 RX ADMIN — BUSPIRONE HYDROCHLORIDE 10 MG: 10 TABLET ORAL at 10:42

## 2024-06-25 RX ADMIN — MORPHINE SULFATE 2 MG: 2 INJECTION, SOLUTION INTRAMUSCULAR; INTRAVENOUS at 04:44

## 2024-06-25 RX ADMIN — SODIUM CHLORIDE, SODIUM LACTATE, POTASSIUM CHLORIDE, AND CALCIUM CHLORIDE 1000 ML: .6; .31; .03; .02 INJECTION, SOLUTION INTRAVENOUS at 04:42

## 2024-06-25 RX ADMIN — PROMETHAZINE HYDROCHLORIDE 25 MG: 25 INJECTION INTRAMUSCULAR; INTRAVENOUS at 04:44

## 2024-06-25 RX ADMIN — ACETAMINOPHEN 975 MG: 325 TABLET, FILM COATED ORAL at 22:30

## 2024-06-25 RX ADMIN — ROPIVACAINE HYDROCHLORIDE: 2 INJECTION, SOLUTION EPIDURAL; INFILTRATION at 21:12

## 2024-06-25 RX ADMIN — SODIUM CHLORIDE, SODIUM LACTATE, POTASSIUM CHLORIDE, AND CALCIUM CHLORIDE 125 ML/HR: .6; .31; .03; .02 INJECTION, SOLUTION INTRAVENOUS at 20:33

## 2024-06-25 RX ADMIN — BUSPIRONE HYDROCHLORIDE 10 MG: 10 TABLET ORAL at 20:16

## 2024-06-25 RX ADMIN — SODIUM CHLORIDE, SODIUM LACTATE, POTASSIUM CHLORIDE, AND CALCIUM CHLORIDE 125 ML/HR: .6; .31; .03; .02 INJECTION, SOLUTION INTRAVENOUS at 11:15

## 2024-06-25 RX ADMIN — SODIUM CHLORIDE, SODIUM LACTATE, POTASSIUM CHLORIDE, AND CALCIUM CHLORIDE 999 ML/HR: .6; .31; .03; .02 INJECTION, SOLUTION INTRAVENOUS at 10:22

## 2024-06-25 RX ADMIN — SODIUM CHLORIDE, SODIUM LACTATE, POTASSIUM CHLORIDE, AND CALCIUM CHLORIDE 125 ML/HR: .6; .31; .03; .02 INJECTION, SOLUTION INTRAVENOUS at 16:05

## 2024-06-25 RX ADMIN — Medication 2 MILLI-UNITS/MIN: at 11:15

## 2024-06-25 RX ADMIN — LIDOCAINE HYDROCHLORIDE AND EPINEPHRINE 5 ML: 15; 5 INJECTION, SOLUTION EPIDURAL at 14:44

## 2024-06-25 RX ADMIN — ROPIVACAINE HYDROCHLORIDE: 2 INJECTION, SOLUTION EPIDURAL; INFILTRATION at 14:59

## 2024-06-25 RX ADMIN — SODIUM CHLORIDE, SODIUM LACTATE, POTASSIUM CHLORIDE, AND CALCIUM CHLORIDE 125 ML/HR: .6; .31; .03; .02 INJECTION, SOLUTION INTRAVENOUS at 23:59

## 2024-06-25 RX ADMIN — CALCIUM CARBONATE (ANTACID) CHEW TAB 500 MG 1000 MG: 500 CHEW TAB at 19:42

## 2024-06-25 RX ADMIN — SODIUM CHLORIDE, SODIUM LACTATE, POTASSIUM CHLORIDE, AND CALCIUM CHLORIDE 125 ML/HR: .6; .31; .03; .02 INJECTION, SOLUTION INTRAVENOUS at 18:41

## 2024-06-25 NOTE — PLAN OF CARE
Problem: BIRTH - VAGINAL/ SECTION  Goal: Fetal and maternal status remain reassuring during the birth process  Description: INTERVENTIONS:  - Monitor vital signs  - Monitor fetal heart rate  - Monitor uterine activity  - Monitor labor progression (vaginal delivery)  - DVT prophylaxis  - Antibiotic prophylaxis  Outcome: Progressing  Goal: Emotionally satisfying birthing experience for mother/fetus  Description: Interventions:  - Assess, plan, implement and evaluate the nursing care given to the patient in labor  - Advocate the philosophy that each childbirth experience is a unique experience and support the family's chosen level of involvement and control during the labor process   - Actively participate in both the patient's and family's teaching of the birth process  - Consider cultural, Scientology and age-specific factors and plan care for the patient in labor  Outcome: Progressing     Problem: Knowledge Deficit  Goal: Verbalizes understanding of labor plan  Description: Assess patient/family/caregiver's baseline knowledge level and ability to understand information.  Provide education via patient/family/caregiver's preferred learning method at appropriate level of understanding.     1. Provide teaching at level of understanding.  2. Provide teaching via preferred learning method(s).  Outcome: Progressing  Goal: Patient/family/caregiver demonstrates understanding of disease process, treatment plan, medications, and discharge instructions  Description: Complete learning assessment and assess knowledge base.  Interventions:  - Provide teaching at level of understanding  - Provide teaching via preferred learning methods  Outcome: Progressing     Problem: Labor & Delivery  Goal: Manages discomfort  Description: Assess and monitor for signs and symptoms of discomfort.  Assess patient's pain level regularly and per hospital policy.  Administer medications as ordered. Support use of nonpharmacological methods to  help control pain such as distraction, imagery, relaxation, and application of heat and cold.  Collaborate with interdisciplinary team and patient to determine appropriate pain management plan.    1. Include patient in decisions related to comfort.  2. Offer non-pharmacological pain management interventions.  3. Report ineffective pain management to physician.  Outcome: Progressing  Goal: Patient vital signs are stable  Description: 1. Assess vital signs - vaginal delivery.  Outcome: Progressing     Problem: PAIN - ADULT  Goal: Verbalizes/displays adequate comfort level or baseline comfort level  Description: Interventions:  - Encourage patient to monitor pain and request assistance  - Assess pain using appropriate pain scale  - Administer analgesics based on type and severity of pain and evaluate response  - Implement non-pharmacological measures as appropriate and evaluate response  - Consider cultural and social influences on pain and pain management  - Notify physician/advanced practitioner if interventions unsuccessful or patient reports new pain  Outcome: Progressing     Problem: INFECTION - ADULT  Goal: Absence or prevention of progression during hospitalization  Description: INTERVENTIONS:  - Assess and monitor for signs and symptoms of infection  - Monitor lab/diagnostic results  - Monitor all insertion sites, i.e. indwelling lines, tubes, and drains  - Monitor endotracheal if appropriate and nasal secretions for changes in amount and color  - [place] appropriate cooling/warming therapies per order  - Administer medications as ordered  - Instruct and encourage patient and family to use good hand hygiene technique  - Identify and instruct in appropriate isolation precautions for identified infection/condition  Outcome: Progressing  Goal: Absence of fever/infection during neutropenic period  Description: INTERVENTIONS:  - Monitor WBC    Outcome: Progressing     Problem: SAFETY ADULT  Goal: Patient will  remain free of falls  Description: INTERVENTIONS:  - Educate patient/family on patient safety including physical limitations  - Instruct patient to call for assistance with activity   - Consult OT/PT to assist with strengthening/mobility   - Keep Call bell within reach  - Keep bed low and locked with side rails adjusted as appropriate  - Keep care items and personal belongings within reach  - Initiate and maintain comfort rounds  - Make Fall Risk Sign visible to staff  - Offer Toileting every  Hours, in advance of need  - Initiate/Maintain alarm  - Obtain necessary fall risk management equipment:   - Apply yellow socks and bracelet for high fall risk patients  - Consider moving patient to room near nurses station  Outcome: Progressing  Goal: Maintain or return to baseline ADL function  Description: INTERVENTIONS:  -  Assess patient's ability to carry out ADLs; assess patient's baseline for ADL function and identify physical deficits which impact ability to perform ADLs (bathing, care of mouth/teeth, toileting, grooming, dressing, etc.)  - Assess/evaluate cause of self-care deficits   - Assess range of motion  - Assess patient's mobility; develop plan if impaired  - Assess patient's need for assistive devices and provide as appropriate  - Encourage maximum independence but intervene and supervise when necessary  - Involve family in performance of ADLs  - Assess for home care needs following discharge   - Consider OT consult to assist with ADL evaluation and planning for discharge  - Provide patient education as appropriate  Outcome: Progressing  Goal: Maintains/Returns to pre admission functional level  Description: INTERVENTIONS:  - Perform AM-PAC 6 Click Basic Mobility/ Daily Activity assessment daily.  - Set and communicate daily mobility goal to care team and patient/family/caregiver.   - Collaborate with rehabilitation services on mobility goals if consulted  - Perform Range of Motion times a day.  - Reposition  patient every  hours.  - Dangle patient  times a day  - Stand patient  times a day  - Ambulate patient  times a day  - Out of bed to chair  times a day   - Out of bed for meal times a day  - Out of bed for toileting  - Record patient progress and toleration of activity level   Outcome: Progressing     Problem: DISCHARGE PLANNING  Goal: Discharge to home or other facility with appropriate resources  Description: INTERVENTIONS:  - Identify barriers to discharge w/patient and caregiver  - Arrange for needed discharge resources and transportation as appropriate  - Identify discharge learning needs (meds, wound care, etc.)  - Arrange for interpretive services to assist at discharge as needed  - Refer to Case Management Department for coordinating discharge planning if the patient needs post-hospital services based on physician/advanced practitioner order or complex needs related to functional status, cognitive ability, or social support system  Outcome: Progressing

## 2024-06-25 NOTE — ASSESSMENT & PLAN NOTE
First elevated BP of pregnancy at 12w0d  Subsequent mild range BP at 20w5d, 32w0d  CBC/CMP wnl; p:c ratio: 0.09  Systolic (12hrs), Av , Min:124 , Max:137   Diastolic (12hrs), Av, Min:69, Max:85

## 2024-06-25 NOTE — ANESTHESIA PREPROCEDURE EVALUATION
Procedure:  LABOR ANALGESIA    Relevant Problems   CARDIO   (+) Chronic hypertension      GI/HEPATIC   (+) Gastroesophageal reflux disease      GYN   (+) 38 weeks gestation of pregnancy      NEURO/PSYCH   (+) TONEY (generalized anxiety disorder)        Physical Exam    Airway    Mallampati score: I  TM Distance: >3 FB  Neck ROM: full     Dental       Cardiovascular  Cardiovascular exam normal    Pulmonary  Pulmonary exam normal     Other Findings  post-pubertal.      Anesthesia Plan  ASA Score- 3     Anesthesia Type- epidural with ASA Monitors.         Additional Monitors:     Airway Plan:            Plan Factors-Exercise tolerance (METS): >4 METS.    Chart reviewed. EKG reviewed. Imaging results reviewed. Existing labs reviewed. Patient summary reviewed.                  Induction-     Postoperative Plan-     Perioperative Resuscitation Plan - Level 1 - Full Code.       Informed Consent- Anesthetic plan and risks discussed with patient.  I personally reviewed this patient with the CRNA. Discussed and agreed on the Anesthesia Plan with the CRNA..

## 2024-06-25 NOTE — ANESTHESIA PROCEDURE NOTES
Epidural Block    Patient location during procedure: OB/L&D  Reason for block: procedure for pain  Staffing  Performed by: Fausto Steele MD  Authorized by: Fausto Steele MD    Preanesthetic Checklist  Completed: patient identified, IV checked, site marked, risks and benefits discussed, surgical consent, monitors and equipment checked, pre-op evaluation and timeout performed  Epidural  Patient position: sitting  Prep: ChloraPrep  Sedation Level: no sedation  Patient monitoring: frequent blood pressure checks, continuous pulse oximetry and heart rate  Approach: midline  Location: lumbar, L2-3  Injection technique: ROYCE saline  Needle  Needle type: Tuohy   Needle gauge: 18 G  Needle insertion depth: 8.5 cm  Catheter type: multi-orifice  Catheter size: 20 G  Catheter at skin depth: 15 cm  Catheter securement method: stabilization device and clear occlusive dressing  Test dose: negativelidocaine-epinephrine (XYLOCAINE-MPF/EPINEPHRINE) 1.5 %-1:200,000 injection 3 mL - Epidural   5 mL - 6/25/2024 2:44:00 PM  Assessment  Sensory level: T10  Number of attempts: 1negative aspiration for CSF, negative aspiration for heme and no paresthesia on injection  patient tolerated the procedure well with no immediate complications

## 2024-06-25 NOTE — ASSESSMENT & PLAN NOTE
EFW 98% with HC >99%, AC>99%  Patient counseled on shoulder dystocia in the office, as well as at time of labor induction as below:   - We discussed that shoulder dystocia can be difficult to predict.  We discussed her specific risk factors for shoulder dystocia, including suspected fetal macrosomia (EFW 98%).  - We discussed how a shoulder dystocia is managed with the typical maneuvers (Sonia, suprapubic pressure, rotational maneuvers, delivery of the posterior arm) and in rare instances desperation maneuvers are required such as clavicular fracture, abdominal rescue, or Zavenelli maneuver.   - We discussed that while 95% of shoulder dystocias are not associated with injury to the , injury to the  can include transient brachial plexus injury (3-16%), clavicular fracture (1-9%), humerus fracture (0.1-4%), permanent brachial plexus palsy (0.5-1.6%), hypoxic-ischemic encephalopathy (0.3%), or death (0.35%).   - We reviewed that when the estimated fetal weight exceeds 4500 grams in a mom with diabetes or 5000 grams in a mom without diabetes, the recommendation is to consider  delivery to reduce the risk of  morbidity.

## 2024-06-25 NOTE — OB LABOR/OXYTOCIN SAFETY PROGRESS
Oxytocin Safety Progress Check Note - Elisabet Oropeza 29 y.o. female MRN: [id number]    Unit/Bed#: -01 Encounter: [id number]    Dose (jairo-units/min) Oxytocin: 6 jairo-units/min  Contraction Frequency (minutes): 2-3  Contraction Intensity: Moderate  Uterine Activity Characteristics: Regular  Cervical Dilation: 3-4        Cervical Effacement: 80  Fetal Station: -2  Baseline Rate (FHR): 140 bpm  Fetal Heart Rate (FHT): 148 BPM  FHR Category: 1               Vital Signs:   Vitals:    06/25/24 1516   BP: 133/75   Pulse: 77   Resp:    Temp:    SpO2:        Notes/comments:   SVE as above. Category I tracing. Patient SROM for clear fluid about an hour ago. Continue pitocin titration. D/w Dr. Shreyas Villa MD 6/25/2024 3:44 PM

## 2024-06-25 NOTE — PROGRESS NOTES
L&D Triage Note - OB/GYN  Elisabet Oropeza 29 y.o. female MRN: [id number]  Unit/Bed#: LD TRIAGE  Encounter: [id number]      ASSESSMENT:    Elisabet Oropeza is a 29 y.o.  at 38w5d who presents with reported painful contractions that resolved on admission. Requesting therapeutic rest given frequent admissions and worsening pain.    PLAN:  - SVE 80/-2  - IV morphine 2mg, IV phenergan 25mg  - 1L LR  - Reassess pain profile and SVE in AM    SUBJECTIVE:    Elisabet Oropeza 29 y.o.  at 38w5d with an Estimated Date of Delivery: 24 who presents for assessment of worsening abdominal pain. Recent discharge at 1530 yesterday afternoon. Elisabet reports contractions less than 5 minutes apart at home that were worsening in intensity. Denies other obstetrical complaints. On presentation to triage, her contractions had resolved both subjectively and on tocometry. She was still concerned about the pain she was experiencing at home and requested therapeutic rest as discussed during her prior triage visit.    Her current obstetrical history is significant for GERD, elevated BP w/o Dx HTN      Contractions: resolved  Leakage of fluid: no  Vaginal Bleeding: no  Fetal movement: present    OBJECTIVE:    Vitals:    24 0344   BP: 138/86   Pulse: 74   Resp: 18   Temp: 98.1 °F (36.7 °C)   SpO2: 98%       ROS:  Constitutional: Negative  Respiratory: Negative  Cardiovascular: Negative    Gastrointestinal: Negative    General Physical Exam:  General: in no apparent distress and well developed and well nourished  Cardiovascular: intact distals pulses  Lungs: non-labored breathing  Abdomen: abdomen is soft without significant tenderness, masses, organomegaly or guarding  Lower extremeties: nontender, b/l Naresh's sign negative    Cervical Exam  SVE: 2 / 80% / -1    Fetal monitoring:  FHT:  135 bpm/ Moderate 6 - 25 bpm / 15 x 15 accelerations present, no decelerations  [place]: contractions absent                Chris  MD Matthew  OBGYN PGY-3  6/25/2024 4:04 AM

## 2024-06-25 NOTE — H&P
H & P- Obstetrics   Elisabet Oropeza 29 y.o. female MRN: [id number]  Unit/Bed#: -01 Encounter: [id number]    Assessment: 29 y.o.  at 38w5d admitted for IOL in setting of newly diagnosed oligohydramnios.  SVE: /-1  FHT: cat1  Clinical EFW: 98%; Cephalic confirmed by TAUS  GBS status: negative     Plan:   * Oligohydramnios  Assessment & Plan  FRANCI 3.3  Admitted for IOL in setting of newly diagnosed oligohydramnios  Admission labs - CBC, T&S, syphilis screen  Clear liquid diet  Anesthesia consult placed   Rh+, Rhogam not indicated   GBS-, prophylaxis not indicated  IOL with pitocin       Fetal macrosomia during pregnancy  Assessment & Plan  EFW 98% with HC >99%, AC>99%  Patient counseled on shoulder dystocia in the office, as well as at time of labor induction as below:   - We discussed that shoulder dystocia can be difficult to predict.  We discussed her specific risk factors for shoulder dystocia, including suspected fetal macrosomia (EFW 98%).  - We discussed how a shoulder dystocia is managed with the typical maneuvers (Sonia, suprapubic pressure, rotational maneuvers, delivery of the posterior arm) and in rare instances desperation maneuvers are required such as clavicular fracture, abdominal rescue, or Zavenelli maneuver.   - We discussed that while 95% of shoulder dystocias are not associated with injury to the , injury to the  can include transient brachial plexus injury (3-16%), clavicular fracture (1-9%), humerus fracture (0.1-4%), permanent brachial plexus palsy (0.5-1.6%), hypoxic-ischemic encephalopathy (0.3%), or death (0.35%).   - We reviewed that when the estimated fetal weight exceeds 4500 grams in a mom with diabetes or 5000 grams in a mom without diabetes, the recommendation is to consider  delivery to reduce the risk of  morbidity.      Chronic hypertension  Assessment & Plan  First elevated BP of pregnancy at 12w0d  Subsequent mild range BP at  20w5d, 32w0d  Normotensive this admission so far  F/u admission CBC, CMP, urine PC    38 weeks gestation of pregnancy  Assessment & Plan  PNL wnl   O+  GBS neg  EFW 7 lbs 13 oz (98%) - HC > 99%, AC > 99%        Discussed case and plan w/ Dr. Pritchett.    Chief Complaint: contractions     HPI: Elisabet Oropeza is a 29 y.o.  with an CLAUDIA of 2024, by Ultrasound at 38w5d who is being admitted for oligohydramnios. She presented to triage with painful uterine contractions and was found to make minimal change to /-1, however FRANCI at that time was notable for oligohydramnios of 3.3 cm. She complains of uterine contractions, has no LOF, and reports no VB. She states she has felt good FM.    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   Respiratory:  Negative for cough and shortness of breath.    Gastrointestinal:  Negative for diarrhea, nausea and vomiting.   Genitourinary:  Negative for dysuria and hematuria.   Skin:  Negative for color change and rash.   Neurological:  Negative for dizziness, syncope and headaches.     OB Hx:  OB History    Para Term  AB Living   1 0 0 0 0 0   SAB IAB Ectopic Multiple Live Births   0 0 0 0 0      # Outcome Date GA Lbr Carlos/2nd Weight Sex Type Anes PTL Lv   1 Current               Obstetric Comments    SMA neg; Hgb electrophoresis WNL       Past Medical Hx:  Past Medical History:   Diagnosis Date    Anxiety     GERD (gastroesophageal reflux disease)      Headache(784.0)     Stress headaches    Ovarian cyst     last assessed 06/24/2014    Varicella     had vaccine       Past Surgical hx:  Past Surgical History:   Procedure Laterality Date    WISDOM TOOTH EXTRACTION  2018       Social Hx:  Alcohol use: denies  Tobacco use: denies  Other substance use: denies    Allergies   Allergen Reactions    Augmentin [Amoxicillin-Pot Clavulanate] Hives     Pt. Breaks up in hives         Medications Prior to Admission:     busPIRone (BUSPAR) 5 mg tablet    Objective:  Temp:  [98.1 °F (36.7 °C)-98.8 °F (37.1 °C)] 98.2 °F (36.8 °C)  HR:  [62-78] 78  Resp:  [18] 18  BP: (118-138)/(67-86) 125/76  Body mass index is 41.6 kg/m².     Physical Exam:  Physical Exam  Constitutional:       Appearance: Normal appearance.   Genitourinary:      Vulva normal.   Cardiovascular:      Rate and Rhythm: Normal rate.   Pulmonary:      Effort: Pulmonary effort is normal.   Abdominal:      Palpations: Abdomen is soft.   Neurological:      General: No focal deficit present.      Mental Status: She is alert and oriented to person, place, and time.   Skin:     General: Skin is dry.   Psychiatric:         Mood and Affect: Mood normal.        FHT:  Baseline Rate (FHR): 130 bpm  Variability: Moderate  Accelerations: 15 x 15 or greater, With fetal movement, Present, At variable times  Decelerations: None  FHR Category: Category I    TOCO:   Contraction Frequency (minutes): 8  Contraction Duration (seconds): 80-90  Contraction Intensity: Mild    Lab Results   Component Value Date    WBC 10.39 (H) 06/19/2024    HGB 12.8 06/19/2024    HCT 39.4 06/19/2024     06/19/2024     06/19/2024     Lab Results   Component Value Date     05/02/2014    K 3.7 02/01/2024     02/01/2024    CO2 20 (L) 02/01/2024    BUN 8 02/01/2024    CREATININE 0.51 (L) 02/01/2024    GLUCOSE 118 05/02/2014    AST 10 (L) 02/01/2024    ALT 8 02/01/2024     Prenatal Labs: Reviewed      Blood type: O+  Antibody:  negative  GBS: negative  HIV: non-reactive  Rubella: immune  Syphilis IgM/IgG: non-reactive  HBsAg: non-reactive  HCAb: non-reactive  Chlamydia: negative  Gonorrhea: negative  Diabetes 1 hour screen: passed  3 hour glucose: n/a  Platelets: 183, pending admission CBC  Hgb: 10.9, pending admission CBC  >2 Midnights  INPATIENT     Signature/Title: Sari Warren MD  Date: 6/25/2024  Time: 10:36 AM

## 2024-06-25 NOTE — TELEPHONE ENCOUNTER
"Reason for Disposition   [1] First baby (primipara) AND [2] contractions < 6 minutes apart  AND [3] present 2 hours    Answer Assessment - Initial Assessment Questions  1. ONSET: \"When did the symptoms begin?\"         Began earlier this afternoon  2. CONTRACTIONS: \"Describe the contractions that you are having.\" (e.g., duration, frequency, regularity, severity)      Occurring every 5 minutes lasting 30-45 seconds   3. CLAUDIA: \"What date are you expecting to deliver?\"      7/4/2024  4. PARITY: \"Have you had a baby before?\" If Yes, ask: \"How long did the labor last?\"      G1  5. FETAL MOVEMENT: \"Has the baby's movement decreased or changed significantly from normal?\"      Fetal movement same as usual  6. OTHER SYMPTOMS: \"Do you have any other symptoms?\" (e.g., leaking fluid from vagina, vaginal bleeding, fever, hand/facial swelling)      denies    Protocols used: Pregnancy - Labor-ADULT-AH    "

## 2024-06-25 NOTE — ASSESSMENT & PLAN NOTE
FRANCI 3.3  Admitted for IOL in setting of newly diagnosed oligohydramnios  Admission labs - CBC, T&S, syphilis screen  Clear liquid diet  Anesthesia consult placed   Rh+, Rhogam not indicated   GBS-, prophylaxis not indicated  IOL with pitocin

## 2024-06-25 NOTE — OB LABOR/OXYTOCIN SAFETY PROGRESS
Oxytocin Safety Progress Check Note - Elisabet Oropeza 29 y.o. female MRN: [id number]    Unit/Bed#: -01 Encounter: [id number]    Dose (jairo-units/min) Oxytocin: 0 jairo-units/min (Per Dr. Shetty)  Contraction Frequency (minutes): 1.5-3.5  Contraction Intensity: Moderate  Uterine Activity Characteristics: Regular  Cervical Dilation: 4-5        Cervical Effacement: 80  Fetal Station: -2  Baseline Rate (FHR): 135 bpm  Fetal Heart Rate (FHT): 91 BPM  FHR Category: II               Vital Signs:   Vitals:    06/25/24 1751   BP: 125/66   Pulse:    Resp:    Temp:    SpO2:        Notes/comments:   FHT demonstrating 5 min deceleration to sherron 80bpm resolved to baseline with maternal repositioning, discontinuing pitocin and bolusing fluids. SVE performed and as above. Terbutaline brought to bedside, however FHR returned to baseline with moderate variability. Continue to monitor for 30min, and if reactive, restart pitocin.      Chela Shetty MD 6/25/2024 6:09 PM

## 2024-06-25 NOTE — OB LABOR/OXYTOCIN SAFETY PROGRESS
Oxytocin Safety Progress Check Note - Elisabet Oropeza 29 y.o. female MRN: [id number]    Unit/Bed#: -01 Encounter: [id number]    Dose (jairo-units/min) Oxytocin: 6 jairo-units/min  Contraction Frequency (minutes): 2-3  Contraction Intensity: Mild  Uterine Activity Characteristics: Regular  Cervical Dilation: 2        Cervical Effacement: 80  Fetal Station: -2  Baseline Rate (FHR): 135 bpm  Fetal Heart Rate (FHT): 151 BPM  FHR Category: 1               Vital Signs:   Vitals:    06/25/24 1323   BP:    Pulse:    Resp:    Temp: 98.7 °F (37.1 °C)   SpO2:        Patient comfortable through contractions, now 2 hours on pitocin.  SVE as above. FHT cat 1. Laura q2-3 regularly. Pitocin at 6, will continue to titrate as tolerated. D/w Dr. Pritchett.       Sari Warren MD 6/25/2024 1:31 PM

## 2024-06-25 NOTE — PROCEDURES
Elisabet MICK Castellanosdar, a  at 38w5d with an CLAUDIA of 2024, by Ultrasound, was seen at [facility] LABOR AND DELIVERY for the following procedure(s): $Procedure Type: FRANCI]         4 Quadrant FRANCI  FRANCI Q1 (cm): 1.4 cm  FRANCI Q2 (cm): 1.2 cm  FRANCI Q3 (cm): 0.8 cm  FRANCI Q4 (cm): 0 cm  FRANCI TOTAL (cm): 3.4 cm                             Sari Warren MD  PGY-1 OBGYN

## 2024-06-26 PROBLEM — Z98.891 S/P CESAREAN SECTION: Status: ACTIVE | Noted: 2024-06-26

## 2024-06-26 LAB
BASE EXCESS BLDCOA CALC-SCNC: -7.1 MMOL/L (ref 3–11)
BASE EXCESS BLDCOV CALC-SCNC: -6.2 MMOL/L (ref 1–9)
HCO3 BLDCOA-SCNC: 18.8 MMOL/L (ref 17.3–27.3)
HCO3 BLDCOV-SCNC: 18.3 MMOL/L (ref 12.2–28.6)
O2 CT VFR BLDCOA CALC: 6.9 ML/DL
OXYHGB MFR BLDCOA: 31.3 %
OXYHGB MFR BLDCOV: 55.8 %
PCO2 BLDCOA: 39.6 MM[HG] (ref 30–60)
PCO2 BLDCOV: 34 MM HG (ref 27–43)
PH BLDCOA: 7.29 [PH] (ref 7.23–7.43)
PH BLDCOV: 7.35 [PH] (ref 7.19–7.49)
PO2 BLDCOA: 16.1 MM HG (ref 5–25)
PO2 BLDCOV: 24.2 MM HG (ref 15–45)
SAO2 % BLDCOV: 12 ML/DL

## 2024-06-26 PROCEDURE — 59510 CESAREAN DELIVERY: CPT | Performed by: OBSTETRICS & GYNECOLOGY

## 2024-06-26 PROCEDURE — 82805 BLOOD GASES W/O2 SATURATION: CPT | Performed by: OBSTETRICS & GYNECOLOGY

## 2024-06-26 PROCEDURE — 59514 CESAREAN DELIVERY ONLY: CPT | Performed by: PHYSICIAN ASSISTANT

## 2024-06-26 RX ORDER — ENOXAPARIN SODIUM 100 MG/ML
40 INJECTION SUBCUTANEOUS EVERY 12 HOURS SCHEDULED
Status: DISCONTINUED | OUTPATIENT
Start: 2024-06-27 | End: 2024-06-28 | Stop reason: HOSPADM

## 2024-06-26 RX ORDER — ONDANSETRON 2 MG/ML
4 INJECTION INTRAMUSCULAR; INTRAVENOUS EVERY 6 HOURS PRN
Status: DISCONTINUED | OUTPATIENT
Start: 2024-06-26 | End: 2024-06-28 | Stop reason: HOSPADM

## 2024-06-26 RX ORDER — NALOXONE HYDROCHLORIDE 0.4 MG/ML
0.1 INJECTION, SOLUTION INTRAMUSCULAR; INTRAVENOUS; SUBCUTANEOUS
Status: ACTIVE | OUTPATIENT
Start: 2024-06-26 | End: 2024-06-27

## 2024-06-26 RX ORDER — SODIUM CHLORIDE, SODIUM LACTATE, POTASSIUM CHLORIDE, CALCIUM CHLORIDE 600; 310; 30; 20 MG/100ML; MG/100ML; MG/100ML; MG/100ML
INJECTION, SOLUTION INTRAVENOUS CONTINUOUS PRN
Status: DISCONTINUED | OUTPATIENT
Start: 2024-06-26 | End: 2024-06-26

## 2024-06-26 RX ORDER — ONDANSETRON 2 MG/ML
4 INJECTION INTRAMUSCULAR; INTRAVENOUS EVERY 6 HOURS PRN
Status: DISCONTINUED | OUTPATIENT
Start: 2024-06-26 | End: 2024-06-26 | Stop reason: SDUPTHER

## 2024-06-26 RX ORDER — NALBUPHINE HYDROCHLORIDE 10 MG/ML
5 INJECTION, SOLUTION INTRAMUSCULAR; INTRAVENOUS; SUBCUTANEOUS
Status: DISPENSED | OUTPATIENT
Start: 2024-06-26 | End: 2024-06-27

## 2024-06-26 RX ORDER — OXYTOCIN/RINGER'S LACTATE 30/500 ML
PLASTIC BAG, INJECTION (ML) INTRAVENOUS CONTINUOUS PRN
Status: DISCONTINUED | OUTPATIENT
Start: 2024-06-26 | End: 2024-06-26

## 2024-06-26 RX ORDER — HYDROMORPHONE HCL/PF 1 MG/ML
0.5 SYRINGE (ML) INJECTION EVERY 2 HOUR PRN
Status: DISCONTINUED | OUTPATIENT
Start: 2024-06-26 | End: 2024-06-28 | Stop reason: HOSPADM

## 2024-06-26 RX ORDER — METOCLOPRAMIDE HYDROCHLORIDE 5 MG/ML
10 INJECTION INTRAMUSCULAR; INTRAVENOUS ONCE AS NEEDED
Status: DISCONTINUED | OUTPATIENT
Start: 2024-06-26 | End: 2024-06-28 | Stop reason: HOSPADM

## 2024-06-26 RX ORDER — ONDANSETRON 2 MG/ML
INJECTION INTRAMUSCULAR; INTRAVENOUS
Status: COMPLETED
Start: 2024-06-26 | End: 2024-06-26

## 2024-06-26 RX ORDER — HYDROMORPHONE HCL/PF 1 MG/ML
0.5 SYRINGE (ML) INJECTION
Status: DISCONTINUED | OUTPATIENT
Start: 2024-06-26 | End: 2024-06-28 | Stop reason: HOSPADM

## 2024-06-26 RX ORDER — CEFAZOLIN SODIUM 2 G/50ML
2000 SOLUTION INTRAVENOUS ONCE
Status: COMPLETED | OUTPATIENT
Start: 2024-06-26 | End: 2024-06-26

## 2024-06-26 RX ORDER — KETOROLAC TROMETHAMINE 30 MG/ML
30 INJECTION, SOLUTION INTRAMUSCULAR; INTRAVENOUS EVERY 6 HOURS PRN
Status: DISCONTINUED | OUTPATIENT
Start: 2024-06-26 | End: 2024-06-27

## 2024-06-26 RX ORDER — ONDANSETRON 2 MG/ML
INJECTION INTRAMUSCULAR; INTRAVENOUS AS NEEDED
Status: DISCONTINUED | OUTPATIENT
Start: 2024-06-26 | End: 2024-06-26

## 2024-06-26 RX ORDER — FAMOTIDINE 20 MG/1
20 TABLET, FILM COATED ORAL 2 TIMES DAILY
Status: DISCONTINUED | OUTPATIENT
Start: 2024-06-26 | End: 2024-06-26

## 2024-06-26 RX ORDER — ONDANSETRON 2 MG/ML
4 INJECTION INTRAMUSCULAR; INTRAVENOUS ONCE AS NEEDED
Status: DISCONTINUED | OUTPATIENT
Start: 2024-06-26 | End: 2024-06-28 | Stop reason: HOSPADM

## 2024-06-26 RX ORDER — METOCLOPRAMIDE HYDROCHLORIDE 5 MG/ML
5 INJECTION INTRAMUSCULAR; INTRAVENOUS EVERY 6 HOURS PRN
Status: ACTIVE | OUTPATIENT
Start: 2024-06-26 | End: 2024-06-27

## 2024-06-26 RX ORDER — FENTANYL CITRATE/PF 50 MCG/ML
25 SYRINGE (ML) INJECTION
Status: COMPLETED | OUTPATIENT
Start: 2024-06-26 | End: 2024-06-26

## 2024-06-26 RX ORDER — MORPHINE SULFATE 0.5 MG/ML
INJECTION, SOLUTION EPIDURAL; INTRATHECAL; INTRAVENOUS AS NEEDED
Status: DISCONTINUED | OUTPATIENT
Start: 2024-06-26 | End: 2024-06-26

## 2024-06-26 RX ORDER — KETOROLAC TROMETHAMINE 30 MG/ML
INJECTION, SOLUTION INTRAMUSCULAR; INTRAVENOUS AS NEEDED
Status: DISCONTINUED | OUTPATIENT
Start: 2024-06-26 | End: 2024-06-26

## 2024-06-26 RX ORDER — ACETAMINOPHEN 325 MG/1
650 TABLET ORAL EVERY 6 HOURS SCHEDULED
Status: COMPLETED | OUTPATIENT
Start: 2024-06-26 | End: 2024-06-27

## 2024-06-26 RX ORDER — LIDOCAINE HCL/EPINEPHRINE/PF 2%-1:200K
VIAL (ML) INJECTION AS NEEDED
Status: DISCONTINUED | OUTPATIENT
Start: 2024-06-26 | End: 2024-06-26

## 2024-06-26 RX ORDER — DEXAMETHASONE SODIUM PHOSPHATE 10 MG/ML
INJECTION, SOLUTION INTRAMUSCULAR; INTRAVENOUS AS NEEDED
Status: DISCONTINUED | OUTPATIENT
Start: 2024-06-26 | End: 2024-06-26

## 2024-06-26 RX ORDER — FAMOTIDINE 20 MG/1
20 TABLET, FILM COATED ORAL 2 TIMES DAILY PRN
Status: DISCONTINUED | OUTPATIENT
Start: 2024-06-26 | End: 2024-06-28 | Stop reason: HOSPADM

## 2024-06-26 RX ORDER — DIPHENHYDRAMINE HYDROCHLORIDE, ZINC ACETATE 2; .1 G/100G; G/100G
CREAM TOPICAL 3 TIMES DAILY PRN
Status: DISCONTINUED | OUTPATIENT
Start: 2024-06-26 | End: 2024-06-28 | Stop reason: HOSPADM

## 2024-06-26 RX ADMIN — ACETAMINOPHEN 975 MG: 325 TABLET, FILM COATED ORAL at 06:43

## 2024-06-26 RX ADMIN — FENTANYL CITRATE 25 MCG: 50 INJECTION INTRAMUSCULAR; INTRAVENOUS at 09:59

## 2024-06-26 RX ADMIN — AMPICILLIN SODIUM AND SULBACTAM SODIUM 3 G: 100; 50 INJECTION, POWDER, FOR SOLUTION INTRAVENOUS at 23:43

## 2024-06-26 RX ADMIN — AMPICILLIN SODIUM AND SULBACTAM SODIUM 3 G: 100; 50 INJECTION, POWDER, FOR SOLUTION INTRAVENOUS at 16:33

## 2024-06-26 RX ADMIN — NALBUPHINE HYDROCHLORIDE 5 MG: 10 INJECTION, SOLUTION INTRAMUSCULAR; INTRAVENOUS; SUBCUTANEOUS at 20:47

## 2024-06-26 RX ADMIN — ONDANSETRON 4 MG: 2 INJECTION INTRAMUSCULAR; INTRAVENOUS at 03:37

## 2024-06-26 RX ADMIN — SODIUM CHLORIDE, SODIUM LACTATE, POTASSIUM CHLORIDE, AND CALCIUM CHLORIDE: .6; .31; .03; .02 INJECTION, SOLUTION INTRAVENOUS at 07:36

## 2024-06-26 RX ADMIN — ONDANSETRON 4 MG: 2 INJECTION INTRAMUSCULAR; INTRAVENOUS at 07:55

## 2024-06-26 RX ADMIN — CEFAZOLIN SODIUM 2000 MG: 2 SOLUTION INTRAVENOUS at 07:42

## 2024-06-26 RX ADMIN — FENTANYL CITRATE 25 MCG: 50 INJECTION INTRAMUSCULAR; INTRAVENOUS at 10:19

## 2024-06-26 RX ADMIN — DEXAMETHASONE SODIUM PHOSPHATE 10 MG: 10 INJECTION, SOLUTION INTRAMUSCULAR; INTRAVENOUS at 07:53

## 2024-06-26 RX ADMIN — NALBUPHINE HYDROCHLORIDE 5 MG: 10 INJECTION, SOLUTION INTRAMUSCULAR; INTRAVENOUS; SUBCUTANEOUS at 14:35

## 2024-06-26 RX ADMIN — DIPHENHYDRAMINE HYDROCHLORIDE, ZINC ACETATE: 2; .1 CREAM TOPICAL at 03:59

## 2024-06-26 RX ADMIN — KETOROLAC TROMETHAMINE 30 MG: 30 INJECTION, SOLUTION INTRAMUSCULAR; INTRAVENOUS at 20:08

## 2024-06-26 RX ADMIN — LIDOCAINE HYDROCHLORIDE,EPINEPHRINE BITARTRATE 5 ML: 20; .005 INJECTION, SOLUTION EPIDURAL; INFILTRATION; INTRACAUDAL; PERINEURAL at 07:36

## 2024-06-26 RX ADMIN — AMPICILLIN SODIUM AND SULBACTAM SODIUM 3 G: 100; 50 INJECTION, POWDER, FOR SOLUTION INTRAVENOUS at 02:34

## 2024-06-26 RX ADMIN — ROPIVACAINE HYDROCHLORIDE: 2 INJECTION, SOLUTION EPIDURAL; INFILTRATION at 03:58

## 2024-06-26 RX ADMIN — LIDOCAINE HYDROCHLORIDE,EPINEPHRINE BITARTRATE 5 ML: 20; .005 INJECTION, SOLUTION EPIDURAL; INFILTRATION; INTRACAUDAL; PERINEURAL at 07:43

## 2024-06-26 RX ADMIN — FENTANYL CITRATE 25 MCG: 50 INJECTION INTRAMUSCULAR; INTRAVENOUS at 09:32

## 2024-06-26 RX ADMIN — ACETAMINOPHEN 650 MG: 325 TABLET, FILM COATED ORAL at 23:40

## 2024-06-26 RX ADMIN — AZITHROMYCIN MONOHYDRATE 500 MG: 500 INJECTION, POWDER, LYOPHILIZED, FOR SOLUTION INTRAVENOUS at 07:47

## 2024-06-26 RX ADMIN — LIDOCAINE HYDROCHLORIDE,EPINEPHRINE BITARTRATE 5 ML: 20; .005 INJECTION, SOLUTION EPIDURAL; INFILTRATION; INTRACAUDAL; PERINEURAL at 07:40

## 2024-06-26 RX ADMIN — MORPHINE SULFATE 3 MG: 0.5 INJECTION, SOLUTION EPIDURAL; INTRATHECAL; INTRAVENOUS at 08:40

## 2024-06-26 RX ADMIN — Medication 250 MILLI-UNITS/MIN: at 08:11

## 2024-06-26 RX ADMIN — FAMOTIDINE 20 MG: 20 TABLET, FILM COATED ORAL at 01:01

## 2024-06-26 RX ADMIN — ACETAMINOPHEN 650 MG: 325 TABLET, FILM COATED ORAL at 18:26

## 2024-06-26 RX ADMIN — ACETAMINOPHEN 650 MG: 325 TABLET, FILM COATED ORAL at 12:45

## 2024-06-26 RX ADMIN — KETOROLAC TROMETHAMINE 30 MG: 30 INJECTION, SOLUTION INTRAMUSCULAR; INTRAVENOUS at 08:49

## 2024-06-26 RX ADMIN — KETOROLAC TROMETHAMINE 30 MG: 30 INJECTION, SOLUTION INTRAMUSCULAR; INTRAVENOUS at 14:32

## 2024-06-26 RX ADMIN — FENTANYL CITRATE 25 MCG: 50 INJECTION INTRAMUSCULAR; INTRAVENOUS at 09:47

## 2024-06-26 RX ADMIN — PHENYLEPHRINE HYDROCHLORIDE 50 MCG/MIN: 50 INJECTION INTRAVENOUS at 07:51

## 2024-06-26 NOTE — OB LABOR/OXYTOCIN SAFETY PROGRESS
Oxytocin Safety Progress Check Note - Elisabet Oropeza 29 y.o. female MRN: [id number]    Unit/Bed#: -01 Encounter: [id number]    Dose (jairo-units/min) Oxytocin: 4 jairo-units/min  Contraction Frequency (minutes): 2-3  Contraction Intensity: Moderate  Uterine Activity Characteristics: Regular  Cervical Dilation: 5-6        Cervical Effacement: 80  Fetal Station: -2  Baseline Rate (FHR): 155 bpm  Fetal Heart Rate (FHT): 162 BPM  FHR Category: II               Vital Signs:   Vitals:    06/25/24 2200   BP: 127/80   Pulse: 92   Resp:    Temp:    SpO2:        Notes/comments:   FHT overall reassuring with moderate variability and spontaneous accelerations. Occasional variable decelerations and fetal baseline between 150 and 160. SVE as above. Continue pitocin titration with fetal tolerance. Dr. Pritchett aware.      Jennifer Echavarria MD 6/25/2024 10:12 PM

## 2024-06-26 NOTE — OB LABOR/OXYTOCIN SAFETY PROGRESS
Labor Progress Note - Elisabet Oropeza 29 y.o. female MRN: [id number]    Unit/Bed#: LD  Encounter: [id number]    Dose (jairo-units/min) Oxytocin: 0 jairo-units/min  Contraction Frequency (minutes): 1.5-3.5  Contraction Intensity: Moderate/Strong  Uterine Activity Characteristics: Irregular  Cervical Dilation: 6-7        Cervical Effacement: 80  Fetal Station: -1  Baseline Rate (FHR): 150 bpm  Fetal Heart Rate (FHT): 169 BPM  FHR Category: ii           Vital Signs:   Vitals:    24 0630   BP: 132/76   Pulse:    Resp:    Temp: 100.5 °F (38.1 °C)   SpO2:        Notes/comments:   Patient expressed desire for  section. Reviewed risk of pain, bleeding, infection, injury to surrounding structure including bowel, bladder, blood vessels or nerves that may require additional surgeries.     Explained implication of  on subsequent vaginal deliveries including risks with TOLAC and risks of increased scar tissue making future pregnancy more dangerous and each future  potentially more complicated and dangerous. Patient and partner indicated understanding risks and reiterated desire to proceed with elective .     She has periods of category ii tracing over course of evening that responded to resuscitation, repositioning, IVF boluses, titration or cessation of pitocin. Her labor course has also been notable for OT presentation that has persisted despite multiple repositioning done by SPARK-trained team members. Suspected fetal macrosomia with abnormal progression (unchanged exam for 2 hours, however, not yet meeting criteria for arrest of dilation).    All questions answered to the best of my ability.        Sangeeta Pritchett MD 2024 6:47 AM

## 2024-06-26 NOTE — OB LABOR/OXYTOCIN SAFETY PROGRESS
Oxytocin Safety Progress Check Note - Elisabet Oropeza 29 y.o. female MRN: [id number]    Unit/Bed#: -01 Encounter: [id number]    Dose (jairo-units/min) Oxytocin: 2 jairo-units/min (per Dr. Echavarria)  Contraction Frequency (minutes): 2-3  Contraction Intensity: Moderate  Uterine Activity Characteristics: Regular  Cervical Dilation: 4-5        Cervical Effacement: 80  Fetal Station: -2  Baseline Rate (FHR): 140 bpm  Fetal Heart Rate (FHT): 138 BPM  FHR Category: II               Vital Signs:   Vitals:    06/25/24 2020   BP: 130/80   Pulse: 100   Resp:    Temp:    SpO2:        Notes/comments:   Pitocin was restarted after return to category I tracing. FHT with moderate variability and spontaneous accelerations but rare variable decelerations. SVE deferred at this time as pitocin was restarted recently. Will check in 2 hours or if decelerations become more frequent      Jennifer Echavarria MD 6/25/2024 8:36 PM

## 2024-06-26 NOTE — OB LABOR/OXYTOCIN SAFETY PROGRESS
Oxytocin Safety Progress Check Note - Elisabet Oropeza 29 y.o. female MRN: [id number]    Unit/Bed#: -01 Encounter: [id number]    Dose (jairo-units/min) Oxytocin: 4 jairo-units/min  Contraction Frequency (minutes): 1-4  Contraction Intensity: Moderate  Uterine Activity Characteristics: Regular  Cervical Dilation: 5-6        Cervical Effacement: 80  Fetal Station: -2  Baseline Rate (FHR): 155 bpm  Fetal Heart Rate (FHT): 163 BPM  FHR Category: II               Vital Signs:   Vitals:    06/25/24 2235   BP:    Pulse:    Resp:    Temp: 98.3 °F (36.8 °C)   SpO2:        Notes/comments:   Pitocin restarted but still at 2 due to difficulty tracing contractions. IUPC placed. New maternal temperature of 100.8 @ 0100 with repeat 30 minutes later 101.5. plan to start unasyn. Will do SPARK maneuvers to encourage fetal descent.      Jennifer Echavarria MD 6/25/2024 10:55 PM

## 2024-06-26 NOTE — ANESTHESIA POSTPROCEDURE EVALUATION
Post-Op Assessment Note    CV Status:  Stable    Pain management: adequate      Post-op block assessment: no complications, site cleaned and catheter intact   Mental Status:  Alert and awake   Hydration Status:  Euvolemic   PONV Controlled:  Controlled   Airway Patency:  Patent     Post Op Vitals Reviewed: Yes    No anethesia notable event occurred.    Staff: URMILA               /56 (06/26/24 0900)    Temp 99.5 °F (37.5 °C) (06/26/24 0900)    Pulse 84 (06/26/24 0900)   Resp 16 (06/26/24 0900)    SpO2 95 % (06/26/24 0900)

## 2024-06-26 NOTE — OB LABOR/OXYTOCIN SAFETY PROGRESS
Oxytocin Safety Progress Check Note - Elisabet Oropeza 29 y.o. female MRN: [id number]    Unit/Bed#: -01 Encounter: [id number]    Dose (jairo-units/min) Oxytocin: 0 jairo-units/min  Contraction Frequency (minutes): 1.5-3.5  Contraction Intensity: Moderate/Strong  Uterine Activity Characteristics: Irregular  Cervical Dilation: 6-7        Cervical Effacement: 80  Fetal Station: -1  Baseline Rate (FHR): 150 bpm  Fetal Heart Rate (FHT): 169 BPM  FHR Category: II               Vital Signs:   Vitals:    06/26/24 0630   BP: 132/76   Pulse:    Resp:    Temp: 100.5 °F (38.1 °C)   SpO2:        Notes/comments:   Patient is more uncomfortable. SVE unchanged. Patient is requesting a C/S at this time. Counseled on risks and benefits. She understands and desires proceeding with 1LTCS for maternal request. Pitocin turned off. Will order ancef and azithromycin for surgical prophylaxis. Discussed with Dr. Shreyas Echavarria MD 6/26/2024 6:45 AM

## 2024-06-26 NOTE — PLAN OF CARE
Problem: BIRTH - VAGINAL/ SECTION  Goal: Fetal and maternal status remain reassuring during the birth process  Description: INTERVENTIONS:  - Monitor vital signs  - Monitor fetal heart rate  - Monitor uterine activity  - Monitor labor progression (vaginal delivery)  - DVT prophylaxis  - Antibiotic prophylaxis  Outcome: Completed  Goal: Emotionally satisfying birthing experience for mother/fetus  Description: Interventions:  - Assess, plan, implement and evaluate the nursing care given to the patient in labor  - Advocate the philosophy that each childbirth experience is a unique experience and support the family's chosen level of involvement and control during the labor process   - Actively participate in both the patient's and family's teaching of the birth process  - Consider cultural, Jehovah's witness and age-specific factors and plan care for the patient in labor  Outcome: Completed     Problem: POSTPARTUM  Goal: Experiences normal postpartum course  Description: INTERVENTIONS:  - Monitor maternal vital signs  - Assess uterine involution and lochia  Outcome: Progressing  Goal: Appropriate maternal -  bonding  Description: INTERVENTIONS:  - Identify family support  - Assess for appropriate maternal/infant bonding   -Encourage maternal/infant bonding opportunities  - Referral to  or  as needed  Outcome: Progressing  Goal: Establishment of infant feeding pattern  Description: INTERVENTIONS:  - Assess breast/bottle feeding  - Refer to lactation as needed  Outcome: Progressing  Goal: Incision(s), wounds(s) or drain site(s) healing without S/S of infection  Description: INTERVENTIONS  - Assess and document dressing, incision, wound bed, drain sites and surrounding tissue  - Provide patient and family education  Problem: Labor & Delivery  Goal: Manages discomfort  Description: Assess and monitor for signs and symptoms of discomfort.  Assess patient's pain level regularly and per  hospital policy.  Administer medications as ordered. Support use of nonpharmacological methods to help control pain such as distraction, imagery, relaxation, and application of heat and cold.  Collaborate with interdisciplinary team and patient to determine appropriate pain management plan.    1. Include patient in decisions related to comfort.  2. Offer non-pharmacological pain management interventions.  3. Report ineffective pain management to physician.  Outcome: Completed  Goal: Patient vital signs are stable  Description: 1. Assess vital signs - vaginal delivery.  Outcome: Completed     Problem: PAIN - ADULT  Goal: Verbalizes/displays adequate comfort level or baseline comfort level  Description: Interventions:  - Encourage patient to monitor pain and request assistance  - Assess pain using appropriate pain scale  - Administer analgesics based on type and severity of pain and evaluate response  - Implement non-pharmacological measures as appropriate and evaluate response  - Consider cultural and social influences on pain and pain management  - Notify physician/advanced practitioner if interventions unsuccessful or patient reports new pain  Outcome: Progressing     Problem: INFECTION - ADULT  Goal: Absence or prevention of progression during hospitalization  Description: INTERVENTIONS:  - Assess and monitor for signs and symptoms of infection  - Monitor lab/diagnostic results  - Monitor all insertion sites, i.e. indwelling lines, tubes, and drains  - Monitor endotracheal if appropriate and nasal secretions for changes in amount and color  - [employer] appropriate cooling/warming therapies per order  - Administer medications as ordered  - Instruct and encourage patient and family to use good hand hygiene technique  - Identify and instruct in appropriate isolation precautions for identified infection/condition  Outcome: Progressing  Goal: Absence of fever/infection during neutropenic period  Description:  INTERVENTIONS:  - Monitor WBC    Outcome: Progressing     Problem: SAFETY ADULT  Goal: Patient will remain free of falls  Description: INTERVENTIONS:  - Educate patient/family on patient safety including physical limitations  - Instruct patient to call for assistance with activity   - Consult OT/PT to assist with strengthening/mobility   - Keep Call bell within reach  - Keep bed low and locked with side rails adjusted as appropriate  - Keep care items and personal belongings within reach  - Initiate and maintain comfort rounds  - Make Fall Risk Sign visible to staff  - Apply yellow socks and bracelet for high fall risk patients  - Consider moving patient to room near nurses station  Outcome: Progressing  Goal: Maintain or return to baseline ADL function  Description: INTERVENTIONS:  -  Assess patient's ability to carry out ADLs; assess patient's baseline for ADL function and identify physical deficits which impact ability to perform ADLs (bathing, care of mouth/teeth, toileting, grooming, dressing, etc.)  - Assess/evaluate cause of self-care deficits   - Assess range of motion  - Assess patient's mobility; develop plan if impaired  - Assess patient's need for assistive devices and provide as appropriate  - Encourage maximum independence but intervene and supervise when necessary  - Involve family in performance of ADLs  - Assess for home care needs following discharge   - Consider OT consult to assist with ADL evaluation and planning for discharge  - Provide patient education as appropriate  Outcome: Progressing    Problem: DISCHARGE PLANNING  Goal: Discharge to home or other facility with appropriate resources  Description: INTERVENTIONS:  - Identify barriers to discharge w/patient and caregiver  - Arrange for needed discharge resources and transportation as appropriate  - Identify discharge learning needs (meds, wound care, etc.)  - Arrange for interpretive services to assist at discharge as needed  - Refer to  Case Management Department for coordinating discharge planning if the patient needs post-hospital services based on physician/advanced practitioner order or complex needs related to functional status, cognitive ability, or social support system  Outcome: Progressing        Outcome: Progressing     Problem: Knowledge Deficit  Goal: Verbalizes understanding of labor plan  Description: Assess patient/family/caregiver's baseline knowledge level and ability to understand information.  Provide education via patient/family/caregiver's preferred learning method at appropriate level of understanding.     1. Provide teaching at level of understanding.  2. Provide teaching via preferred learning method(s).  Outcome: Progressing  Goal: Patient/family/caregiver demonstrates understanding of disease process, treatment plan, medications, and discharge instructions  Description: Complete learning assessment and assess knowledge base.  Interventions:  - Provide teaching at level of understanding  - Provide teaching via preferred learning methods  Outcome: Progressing

## 2024-06-26 NOTE — DISCHARGE SUMMARY
Obstetrics Discharge Summary  Elisabet Oropeza 29 y.o. female MRN: [id number]  Unit/Bed#: -01 Encounter: [id number]    Admission Date: 2024     Discharge Date: 24    Admitting Attending: Dr. Pirtchett  Delivery Attending: Dr. Irwin  Discharging Attending:  Dr. Pritchett    Admitting Diagnoses:   38 weeks gestation of pregnancy  Oligohydramnios  Chronic hypertension  Suspected fetal macrosomia    Discharge Diagnoses:   Same, delivered    Procedures: 1LTCS    Anesthesia: epidural    Hospital course: Elisabet Oropeza is now a 29 y.o.  who was initially admitted for induction of labor in the setting of oligohydramnios.  She was induced with pitocin. She SROMed for clear fluid. Pitocin was discontinued due to a 5 minute deceleration and later restarted. Patient made change to 5.5/80/-2 and an IUPC was placed. Patient then made change to 6.5/80/-1 and TAUS showed baby in LOT position. She remained unchanged and patient requested primary low transverse  section    Delivery Findings:  She underwent an uncomplicated primary low transverse  section delivery on 2024 8:10 AM and delivered a viable female  at 0810. Placenta delivered without difficulty.   was admitted to the  nursery. Patient tolerated the procedure well and was transferred to recovery in stable condition.     Baby's Weight: 3365 g (7 lb 6.7 oz); 118.7    Apgar scores: 9  and 9  at 1 and 5 minutes, respectively  QBL: 868mL    The patient's post partum course was unremarkable.. Her preoperative hemoglobin was 11.5g/dL, postoperative was 9.0 and she received venofer. Her postoperative pain was well controlled with oral analgesics.    Her postpartum pain was well controlled with oral analgesics. Maternal blood type is O positive so RhoGAM was not indicated.    On day of discharge, she was ambulating and able to reasonably perform all ADLs. She was voiding and had appropriate bowel function. Pain was  well controlled. She was discharged home on postpartum day #2 without complications. Patient was instructed to follow up with her OBGYN as an outpatient and was given appropriate warnings to call provider if she develops signs of infection or uncontrolled pain.    Complications: none apparent    Condition at discharge: Good    Disposition: Home    Planned Readmission: No    Discharge Medications:   Please see AVS for a complete list of discharge medications.    Discharge instructions :   -Do not place anything (no partner, tampons or douche) in your vagina for 6 weeks  -You may walk for exercise for the first 6 weeks then gradually return to your usual activities   -Please do not drive for 1 week if you have no stitches and for 2 weeks if you have stitches    -You may take baths or shower per your preference   -Please examine your breasts in the mirror daily and call your doctor for redness or tenderness or increased warmth    -Please call your doctor's office if temperature > 100.4*F or 38* C, worsening pain or a foul discharge.    Follow Up:  - Follow up in 3 weeks for postpartum visit or sooner if needed    Nyasia Villa MD  OBGYN, PGY-1  06/28/24  3:38 PM

## 2024-06-26 NOTE — OP NOTE
OPERATIVE REPORT  PATIENT NAME: Elisabet Oropeza    :  1995  MRN: [id number]  Pt Location: [facility] L&D OR ROOM 02    SURGERY DATE: 2024    Surgeons and Role:     * Suha Irwin MD - Primary     * Elizabeth Bullard PA-C - Assisting    Preop Diagnosis:  Category II fetal heart rate tracing during labor and delivery [O76]    Post-Op Diagnosis Codes:     * Category II fetal heart rate tracing during labor and delivery [O76]    Procedure(s) (LRB):   SECTION () (N/A)    Specimen(s):  ID Type Source Tests Collected by Time Destination   A :  Cord Blood Cord BLOOD GAS, VENOUS, CORD, BLOOD GAS, ARTERIAL, CORD Suha Irwin MD 2024 0811    B :  Tissue (Placenta on Hold) OB Only Placenta PLACENTA IN STORAGE Suha Irwin MD 2024 0812        Surgical QBL:  Surgical QBL (mL): 868 mL      Drains:  Urethral Catheter 16 Fr. (Active)   Output (mL) 100 mL 24 1730   Number of days: 1       Anesthesia Type:   Epidural with duramorph    Operative Indications:  Category II fetal heart rate tracing during labor and delivery [O76]  Elective, patient decline further nancy     Nba Group Classification System:  No Multiple pregnancy, No Transverse or oblique lie, No Breech lie, Gestational age is > or =37 weeks, Nulliparous, Labor induced +  is NBA GROUP 2a    Operative Findings:  Viable female infant weighing 7 pounds 7 ounces and Apgars of 9 and 9 born at 810 am.  Baby's caput was noted to be right on the top of the head indicating deflexed positioning    Complications:   None    Procedure and Technique:  Patient and family counseled by Dr. Pritchett and Elizabeth Bullard PAC.  Dr. Irwin checked with patient and family regarding further questions and brief review of procedure.  They reported no further questions and desire to proceed.  The patient was taken to the operating room where a time out was performed to confirm correct patient and correct procedure.  Epidural anesthesia was  adequately established an 2 gm Ancef as well as 500 mg of azithromycin was given for preoperative prophylaxis.  The patient was then placed in the dorsal supine position with a left tilt of the hips.  Pressure points were padded and a Wilbur hugger was placed to maintain control of core body temperature.  The patient was then prepped and draped in the usual sterile fashion for a pfannenstiel skin incision.  After appropriate timeout case was begun.    An incision was made in the skin with a surgical scalpel and sharp dissection was carried out over subsequent layers of tissue including the fascia.  The fascia was incised at the midline and the fascial incision was extended bilaterally using the curved Loco scissors.      The superior edge of the fascial incision was grasped with Kocher clamps, tented up and the underlying rectus muscles were dissected off bluntly and sharply using the . scissors.  The rectus muscles were then divided at midline and the peritoneum was identified, tented up at its upper margin taking care to avoid the bladder, and then entered.  The peritoneal incision was extended superiorly and inferiorly.  The Kade O ring was placed for tissue retraction.  A transverse incision was made in the lower uterine segment using a new surgical blade.  The uterine incision was extended cephalad and caudal using blunt dissection.  The amniotic sac was entered and the amniotic fluid was noted to be Clear.    The surgeon's hand was placed into the uterine cavity.  The fetus was noted to be in cephalic presentation, and the presenting part was grasped and delivered through the uterine incision with the assistance of fundal pressure.  1 loose nuchal cord was identified and reduced on the incision. The infant's oral and nasal passages were bulb suctioned.  On delivery after appropriate delay the cord was doubly clamped and cut.  The infant was then passed off the table to the awaiting  staff. Venous and  arterial blood gas, cord blood, and portion of cord was obtained for analysis and routine blood testing.  The placenta delivery was then expressed. Placenta was noted to be intact with a centrally inserted three-vessel cord.  Oxytocin was administered by IV infusion to enhance uterine contraction.  The uterus gently curetted with wet sponged and cleared of all clots and remaining products of conception.  The uterine incision was re approximated using a 0 Monocryl in a running locked fashion.  A second vertical embrocating stitch with 0 Monocryl was applied.  The uterine incision was examined and noted to be hemostatic.  The pericolic gutters were irrigated and cleared of all clots and products of conception.  The uterine incision was once again reexamined and noted to be hemostatic.  The fascia was re approximated using 0 Vicryl in a running nonlocked fashion, sutures that met to the left of the midline.  The subcutaneous tissue was irrigated and cleared of all clots and debris.  Good hemostasis was noted with Bovie electrocautery.  The subcutaneous  tissue was reapproximated with 2-0 Plain suture.  The skin incision was closed using running absorbable suture with  4-0 Vicryl.  Exofin was placed on top of the skin incision, later after drying Telfa was applied.  Good hemostasis was noted.  Patient tolerated the procedure wass.  All needle, sponge, and instrument counts were noted to be correct x 2 at the end of the procedure.  Patient was transferred to the recovery room in stable condition.  Dr. Irwin was present for the entire procedure.       I was present for the entire procedure.    Patient Disposition:  PACU  and hemodynamically stable    The assistance of Elizabeth Bullard PA-C was required to accomplish this case for retraction and surgical assistance.  No qualified resident was available.    SIGNATURE: Suha Irwin MD  DATE: June 26, 2024  TIME: 9:12 AM

## 2024-06-26 NOTE — OB LABOR/OXYTOCIN SAFETY PROGRESS
Oxytocin Safety Progress Check Note - Elisabet Oropeza 29 y.o. female MRN: [id number]    Unit/Bed#: -01 Encounter: [id number]    Dose (jairo-units/min) Oxytocin: 8 jairo-units/min  Contraction Frequency (minutes): 1.5-3  Contraction Intensity: Moderate/Strong  Uterine Activity Characteristics: Irregular  Cervical Dilation: 6-7        Cervical Effacement: 80  Fetal Station: -1  Baseline Rate (FHR): 145 bpm  Fetal Heart Rate (FHT): 130 BPM  FHR Category: I               Vital Signs:   Vitals:    06/26/24 0415   BP: 130/72   Pulse:    Resp:    Temp:    SpO2:        Notes/comments:   FHT cat I with contractions every 1-4 minutes. TAUS baby appears to be LOT. Continue pitocin titration. Fetal tachycardia has resolved with IV abx. Maternal temperature still mildly elevated at 100.8. plan for tylenol 8 hrs form last dose.       Jennifer Echavarria MD 6/26/2024 4:31 AM

## 2024-06-27 PROBLEM — Z98.891 STATUS POST PRIMARY LOW TRANSVERSE CESAREAN SECTION: Chronic | Status: ACTIVE | Noted: 2024-06-26

## 2024-06-27 LAB
BASOPHILS # BLD AUTO: 0.06 THOUSANDS/ÂΜL (ref 0–0.1)
BASOPHILS NFR BLD AUTO: 0 % (ref 0–1)
EOSINOPHIL # BLD AUTO: 0.05 THOUSAND/ÂΜL (ref 0–0.61)
EOSINOPHIL NFR BLD AUTO: 0 % (ref 0–6)
ERYTHROCYTE [DISTWIDTH] IN BLOOD BY AUTOMATED COUNT: 16.4 % (ref 11.6–15.1)
HCT VFR BLD AUTO: 27.7 % (ref 34.8–46.1)
HGB BLD-MCNC: 9 G/DL (ref 11.5–15.4)
IMM GRANULOCYTES # BLD AUTO: 0.12 THOUSAND/UL (ref 0–0.2)
IMM GRANULOCYTES NFR BLD AUTO: 1 % (ref 0–2)
LYMPHOCYTES # BLD AUTO: 2.86 THOUSANDS/ÂΜL (ref 0.6–4.47)
LYMPHOCYTES NFR BLD AUTO: 18 % (ref 14–44)
MCH RBC QN AUTO: 31.6 PG (ref 26.8–34.3)
MCHC RBC AUTO-ENTMCNC: 32.5 G/DL (ref 31.4–37.4)
MCV RBC AUTO: 97 FL (ref 82–98)
MONOCYTES # BLD AUTO: 1.07 THOUSAND/ÂΜL (ref 0.17–1.22)
MONOCYTES NFR BLD AUTO: 7 % (ref 4–12)
NEUTROPHILS # BLD AUTO: 11.86 THOUSANDS/ÂΜL (ref 1.85–7.62)
NEUTS SEG NFR BLD AUTO: 74 % (ref 43–75)
NRBC BLD AUTO-RTO: 0 /100 WBCS
PLATELET # BLD AUTO: 149 THOUSANDS/UL (ref 149–390)
PMV BLD AUTO: 11.6 FL (ref 8.9–12.7)
RBC # BLD AUTO: 2.85 MILLION/UL (ref 3.81–5.12)
WBC # BLD AUTO: 16.02 THOUSAND/UL (ref 4.31–10.16)

## 2024-06-27 PROCEDURE — 85025 COMPLETE CBC W/AUTO DIFF WBC: CPT

## 2024-06-27 PROCEDURE — 99024 POSTOP FOLLOW-UP VISIT: CPT | Performed by: OBSTETRICS & GYNECOLOGY

## 2024-06-27 RX ORDER — IBUPROFEN 600 MG/1
600 TABLET ORAL EVERY 6 HOURS
Status: DISCONTINUED | OUTPATIENT
Start: 2024-06-27 | End: 2024-06-28 | Stop reason: HOSPADM

## 2024-06-27 RX ORDER — KETOROLAC TROMETHAMINE 30 MG/ML
30 INJECTION, SOLUTION INTRAMUSCULAR; INTRAVENOUS EVERY 6 HOURS SCHEDULED
Status: DISCONTINUED | OUTPATIENT
Start: 2024-06-27 | End: 2024-06-27

## 2024-06-27 RX ORDER — ACETAMINOPHEN 325 MG/1
650 TABLET ORAL EVERY 6 HOURS SCHEDULED
Status: DISCONTINUED | OUTPATIENT
Start: 2024-06-27 | End: 2024-06-28 | Stop reason: HOSPADM

## 2024-06-27 RX ORDER — OXYCODONE HYDROCHLORIDE 5 MG/1
5 TABLET ORAL EVERY 6 HOURS PRN
Status: DISCONTINUED | OUTPATIENT
Start: 2024-06-27 | End: 2024-06-28 | Stop reason: HOSPADM

## 2024-06-27 RX ORDER — LIDOCAINE HYDROCHLORIDE 20 MG/ML
1 JELLY TOPICAL DAILY PRN
Status: DISCONTINUED | OUTPATIENT
Start: 2024-06-27 | End: 2024-06-28 | Stop reason: HOSPADM

## 2024-06-27 RX ORDER — IBUPROFEN 600 MG/1
600 TABLET ORAL EVERY 6 HOURS PRN
Status: DISCONTINUED | OUTPATIENT
Start: 2024-06-28 | End: 2024-06-27

## 2024-06-27 RX ORDER — BENZOCAINE/MENTHOL 6 MG-10 MG
1 LOZENGE MUCOUS MEMBRANE DAILY PRN
Status: DISCONTINUED | OUTPATIENT
Start: 2024-06-27 | End: 2024-06-28 | Stop reason: HOSPADM

## 2024-06-27 RX ORDER — SENNOSIDES 8.6 MG
1 TABLET ORAL DAILY
Status: DISCONTINUED | OUTPATIENT
Start: 2024-06-27 | End: 2024-06-28 | Stop reason: HOSPADM

## 2024-06-27 RX ORDER — DIPHENHYDRAMINE HCL 25 MG
25 TABLET ORAL EVERY 6 HOURS PRN
Status: DISCONTINUED | OUTPATIENT
Start: 2024-06-27 | End: 2024-06-28 | Stop reason: HOSPADM

## 2024-06-27 RX ORDER — SIMETHICONE 80 MG
80 TABLET,CHEWABLE ORAL EVERY 6 HOURS PRN
Status: DISCONTINUED | OUTPATIENT
Start: 2024-06-27 | End: 2024-06-28 | Stop reason: HOSPADM

## 2024-06-27 RX ADMIN — SENNOSIDES 8.6 MG: 8.6 TABLET, FILM COATED ORAL at 08:36

## 2024-06-27 RX ADMIN — SIMETHICONE 80 MG: 80 TABLET, CHEWABLE ORAL at 19:35

## 2024-06-27 RX ADMIN — KETOROLAC TROMETHAMINE 30 MG: 30 INJECTION, SOLUTION INTRAMUSCULAR; INTRAVENOUS at 08:36

## 2024-06-27 RX ADMIN — BUSPIRONE HYDROCHLORIDE 10 MG: 10 TABLET ORAL at 17:59

## 2024-06-27 RX ADMIN — IBUPROFEN 600 MG: 600 TABLET, FILM COATED ORAL at 20:46

## 2024-06-27 RX ADMIN — BENZOCAINE AND LEVOMENTHOL: 200; 5 SPRAY TOPICAL at 04:54

## 2024-06-27 RX ADMIN — ENOXAPARIN SODIUM 40 MG: 40 INJECTION SUBCUTANEOUS at 08:36

## 2024-06-27 RX ADMIN — ACETAMINOPHEN 650 MG: 325 TABLET, FILM COATED ORAL at 19:28

## 2024-06-27 RX ADMIN — IRON SUCROSE 200 MG: 20 INJECTION, SOLUTION INTRAVENOUS at 06:21

## 2024-06-27 RX ADMIN — ACETAMINOPHEN 650 MG: 325 TABLET, FILM COATED ORAL at 06:13

## 2024-06-27 RX ADMIN — BUSPIRONE HYDROCHLORIDE 10 MG: 10 TABLET ORAL at 00:02

## 2024-06-27 RX ADMIN — KETOROLAC TROMETHAMINE 30 MG: 30 INJECTION, SOLUTION INTRAMUSCULAR; INTRAVENOUS at 14:45

## 2024-06-27 RX ADMIN — ENOXAPARIN SODIUM 40 MG: 40 INJECTION SUBCUTANEOUS at 20:46

## 2024-06-27 RX ADMIN — AMPICILLIN SODIUM AND SULBACTAM SODIUM 3 G: 100; 50 INJECTION, POWDER, FOR SOLUTION INTRAVENOUS at 05:10

## 2024-06-27 RX ADMIN — ACETAMINOPHEN 650 MG: 325 TABLET, FILM COATED ORAL at 13:27

## 2024-06-27 RX ADMIN — KETOROLAC TROMETHAMINE 30 MG: 30 INJECTION, SOLUTION INTRAMUSCULAR; INTRAVENOUS at 02:46

## 2024-06-27 RX ADMIN — BUSPIRONE HYDROCHLORIDE 10 MG: 10 TABLET ORAL at 08:35

## 2024-06-27 NOTE — PROGRESS NOTES
"Progress Note - OB/GYN  Elisabet Oropeza 29 y.o. female MRN: [id number]  Unit/Bed#:  311-01 Encounter: [id number]    Assessment and Plan     Elisabet Oropeza is a patient of: [facility] . She is POD# 1 s/p 1LTCS. Recovering well and is stable.  Loco catheter replaced this morning.    * Status post primary low transverse  section  Assessment & Plan  QBL: 868; Hemoglobin 11.5g/dL --> 9.0g/dL, venofer  Pain regimen: s/p Duramorph; sesar Tylenol, sesar Toradol->Motrin  Continue bowel regimen  VTE chemoprophylaxis: Lovenox 40mg BID  Failed void trial on , cathed for 550, loco replaced  @0500  Encourage ambulation and breastfeeding/pumping    Chronic hypertension  Assessment & Plan  First elevated BP of pregnancy at 12w0d  Subsequent mild range BP at 20w5d, 32w0d  CBC/CMP wnl; p:c ratio: 0.09  Systolic (12hrs), Av , Min:113 , Max:120   Diastolic (12hrs), Av, Min:75, Max:81          Disposition    -  Anticipate discharge home on POD# 2-4      Subjective/Objective     Chief Complaint: Postpartum State     Subjective:    Elisabet Oropeza is POD#1 s/p 1LTCS. She has no current complaints.  Pain is well controlled with medications.  Patient is ambulating without difficulty, though her loco was replaced this morning after failing void trial.  Patient is currently passing flatus, tolerating PO diet, and denies nausea or vomitting. Patient denies fever, chills, chest pain, shortness of breath, or calf tenderness. Lochia is normal. She is Bottle feeding. She is recovering well and is stable.       Vitals:   /75 (BP Location: Right arm)   Pulse 85   Temp 97.9 °F (36.6 °C) (Oral)   Resp 20   Ht 5' 5\" (1.651 m)   Wt 113 kg (250 lb)   LMP 2023 (Approximate)   SpO2 98%   Breastfeeding No   BMI 41.60 kg/m²       Intake/Output Summary (Last 24 hours) at 2024 0703  Last data filed at 2024 0401  Gross per 24 hour   Intake 900 ml   Output 2643 ml   Net -1743 ml       Invasive " Devices       Peripheral Intravenous Line  Duration             Peripheral IV 06/27/24 Right Antecubital <1 day              Drain  Duration             Urethral Catheter Non-latex 16 Fr. <1 day                    Physical Exam:   GEN: Elisabet Oropeza appears well, alert and oriented x 3, pleasant and cooperative   CARDIO: RRR, no murmurs or rubs  RESP:  CTAB, no wheezes or rales  ABDOMEN: soft, no tenderness, no distention, fundus firm and @ below umbilicus, Incision C/D/I  EXTREMITIES: SCDs on, non tender, no erythema    Labs:     Hemoglobin   Date Value Ref Range Status   06/27/2024 9.0 (L) 11.5 - 15.4 g/dL Final   06/25/2024 11.5 11.5 - 15.4 g/dL Final   05/02/2014 14.4 11.5 - 15.4 g/dL Final     Comment:     New Reference Range     WBC   Date Value Ref Range Status   06/27/2024 16.02 (H) 4.31 - 10.16 Thousand/uL Final   06/25/2024 9.43 4.31 - 10.16 Thousand/uL Final   05/02/2014 14.41 (H) 4.31 - 10.16 Thousand/uL Final     Platelets   Date Value Ref Range Status   06/27/2024 149 149 - 390 Thousands/uL Final   06/25/2024 168 149 - 390 Thousands/uL Final   05/02/2014 206 149 - 390 Thousand/uL Final     Comment:     New Reference Range     Creatinine   Date Value Ref Range Status   06/25/2024 0.66 0.60 - 1.30 mg/dL Final     Comment:     Standardized to IDMS reference method   02/01/2024 0.51 (L) 0.60 - 1.30 mg/dL Final     Comment:     Standardized to IDMS reference method   05/02/2014 0.83 0.60 - 1.30 mg/dL Final     Comment:     Standardized to IDMS reference method     AST   Date Value Ref Range Status   06/25/2024 10 (L) 13 - 39 U/L Final   02/01/2024 10 (L) 13 - 39 U/L Final   07/29/2023 17 10 - 30 U/L Final   06/18/2022 12 10 - 30 U/L Final     ALT   Date Value Ref Range Status   06/25/2024 6 (L) 7 - 52 U/L Final     Comment:     Specimen collection should occur prior to Sulfasalazine administration due to the potential for falsely depressed results.    02/01/2024 8 7 - 52 U/L Final     Comment:      Specimen collection should occur prior to Sulfasalazine administration due to the potential for falsely depressed results.    07/29/2023 22 6 - 29 U/L Final   06/18/2022 11 6 - 29 U/L Final          Nyasia Villa MD  6/27/2024  7:03 AM

## 2024-06-27 NOTE — PLAN OF CARE
Problem: Knowledge Deficit  Goal: Verbalizes understanding of labor plan  Description: Assess patient/family/caregiver's baseline knowledge level and ability to understand information.  Provide education via patient/family/caregiver's preferred learning method at appropriate level of understanding.     1. Provide teaching at level of understanding.  2. Provide teaching via preferred learning method(s).  Outcome: Progressing  Goal: Patient/family/caregiver demonstrates understanding of disease process, treatment plan, medications, and discharge instructions  Description: Complete learning assessment and assess knowledge base.  Interventions:  - Provide teaching at level of understanding  - Provide teaching via preferred learning methods  Outcome: Progressing     Problem: PAIN - ADULT  Goal: Verbalizes/displays adequate comfort level or baseline comfort level  Description: Interventions:  - Encourage patient to monitor pain and request assistance  - Assess pain using appropriate pain scale  - Administer analgesics based on type and severity of pain and evaluate response  - Implement non-pharmacological measures as appropriate and evaluate response  - Consider cultural and social influences on pain and pain management  - Notify physician/advanced practitioner if interventions unsuccessful or patient reports new pain  Outcome: Progressing     Problem: INFECTION - ADULT  Goal: Absence or prevention of progression during hospitalization  Description: INTERVENTIONS:  - Assess and monitor for signs and symptoms of infection  - Monitor lab/diagnostic results  - Monitor all insertion sites, i.e. indwelling lines, tubes, and drains  - Monitor endotracheal if appropriate and nasal secretions for changes in amount and color  - [place] appropriate cooling/warming therapies per order  - Administer medications as ordered  - Instruct and encourage patient and family to use good hand hygiene technique  - Identify and instruct in  appropriate isolation precautions for identified infection/condition  Outcome: Progressing  Goal: Absence of fever/infection during neutropenic period  Description: INTERVENTIONS:  - Monitor WBC    Outcome: Progressing     Problem: SAFETY ADULT  Goal: Patient will remain free of falls  Description: INTERVENTIONS:  - Educate patient/family on patient safety including physical limitations  - Instruct patient to call for assistance with activity   - Consult OT/PT to assist with strengthening/mobility   - Keep Call bell within reach  - Keep bed low and locked with side rails adjusted as appropriate  - Keep care items and personal belongings within reach  - Initiate and maintain comfort rounds  - Make Fall Risk Sign visible to staff  - Apply yellow socks and bracelet for high fall risk patients  - Consider moving patient to room near nurses station  Outcome: Progressing  Goal: Maintain or return to baseline ADL function  Description: INTERVENTIONS:  -  Assess patient's ability to carry out ADLs; assess patient's baseline for ADL function and identify physical deficits which impact ability to perform ADLs (bathing, care of mouth/teeth, toileting, grooming, dressing, etc.)  - Assess/evaluate cause of self-care deficits   - Assess range of motion  - Assess patient's mobility; develop plan if impaired  - Assess patient's need for assistive devices and provide as appropriate  - Encourage maximum independence but intervene and supervise when necessary  - Involve family in performance of ADLs  - Assess for home care needs following discharge   - Consider OT consult to assist with ADL evaluation and planning for discharge  - Provide patient education as appropriate  Outcome: Progressing  Goal: Maintains/Returns to pre admission functional level  Description: INTERVENTIONS:  - Perform AM-PAC 6 Click Basic Mobility/ Daily Activity assessment daily.  - Set and communicate daily mobility goal to care team and  patient/family/caregiver.   - Collaborate with rehabilitation services on mobility goals if consulted    - Out of bed for toileting  - Record patient progress and toleration of activity level   Outcome: Progressing     Problem: DISCHARGE PLANNING  Goal: Discharge to home or other facility with appropriate resources  Description: INTERVENTIONS:  - Identify barriers to discharge w/patient and caregiver  - Arrange for needed discharge resources and transportation as appropriate  - Identify discharge learning needs (meds, wound care, etc.)  - Arrange for interpretive services to assist at discharge as needed  - Refer to Case Management Department for coordinating discharge planning if the patient needs post-hospital services based on physician/advanced practitioner order or complex needs related to functional status, cognitive ability, or social support system  Outcome: Progressing     Problem: POSTPARTUM  Goal: Experiences normal postpartum course  Description: INTERVENTIONS:  - Monitor maternal vital signs  - Assess uterine involution and lochia  Outcome: Progressing  Goal: Appropriate maternal -  bonding  Description: INTERVENTIONS:  - Identify family support  - Assess for appropriate maternal/infant bonding   -Encourage maternal/infant bonding opportunities  - Referral to  or  as needed  Outcome: Progressing  Goal: Establishment of infant feeding pattern  Description: INTERVENTIONS:  - Assess breast/bottle feeding  - Refer to lactation as needed  Outcome: Progressing  Goal: Incision(s), wounds(s) or drain site(s) healing without S/S of infection  Description: INTERVENTIONS  - Assess and document dressing, incision, wound bed, drain sites and surrounding tissue  - Provide patient and family education    Outcome: Progressing

## 2024-06-27 NOTE — ASSESSMENT & PLAN NOTE
QBL: 868; Hemoglobin 11.5g/dL --> 9.0g/dL, venofer  Pain regimen: s/p Duramorph; sesar Tylenol, sesar Toradol->Motrin  Continue bowel regimen  VTE chemoprophylaxis: Lovenox 40mg BID  Failed void trial on 6/26, cathed for 550, loco replaced 6/27 @0500 --> Now voiding spontaneously  Encourage ambulation and breastfeeding/pumping

## 2024-06-27 NOTE — PLAN OF CARE
Problem: POSTPARTUM  Goal: Experiences normal postpartum course  Description: INTERVENTIONS:  - Monitor maternal vital signs  - Assess uterine involution and lochia  Outcome: Progressing  Goal: Appropriate maternal -  bonding  Description: INTERVENTIONS:  - Identify family support  - Assess for appropriate maternal/infant bonding   -Encourage maternal/infant bonding opportunities  - Referral to  or  as needed  Outcome: Progressing  Goal: Establishment of infant feeding pattern  Description: INTERVENTIONS:  - Assess breast/bottle feeding  - Refer to lactation as needed  Outcome: Progressing  Goal: Incision(s), wounds(s) or drain site(s) healing without S/S of infection  Description: INTERVENTIONS  - Assess and document dressing, incision, wound bed, drain sites and surrounding tissue  - Provide patient and family education  - Perform skin care/dressing changes every   Outcome: Progressing     Problem: Knowledge Deficit  Goal: Verbalizes understanding of labor plan  Description: Assess patient/family/caregiver's baseline knowledge level and ability to understand information.  Provide education via patient/family/caregiver's preferred learning method at appropriate level of understanding.     1. Provide teaching at level of understanding.  2. Provide teaching via preferred learning method(s).  Outcome: Completed  Goal: Patient/family/caregiver demonstrates understanding of disease process, treatment plan, medications, and discharge instructions  Description: Complete learning assessment and assess knowledge base.  Interventions:  - Provide teaching at level of understanding  - Provide teaching via preferred learning methods  Outcome: Completed     Problem: PAIN - ADULT  Goal: Verbalizes/displays adequate comfort level or baseline comfort level  Description: Interventions:  - Encourage patient to monitor pain and request assistance  - Assess pain using appropriate pain scale  -  Administer analgesics based on type and severity of pain and evaluate response  - Implement non-pharmacological measures as appropriate and evaluate response  - Consider cultural and social influences on pain and pain management  - Notify physician/advanced practitioner if interventions unsuccessful or patient reports new pain  Outcome: Progressing     Problem: INFECTION - ADULT  Goal: Absence or prevention of progression during hospitalization  Description: INTERVENTIONS:  - Assess and monitor for signs and symptoms of infection  - Monitor lab/diagnostic results  - Monitor all insertion sites, i.e. indwelling lines, tubes, and drains  - Monitor endotracheal if appropriate and nasal secretions for changes in amount and color  - [place] appropriate cooling/warming therapies per order  - Administer medications as ordered  - Instruct and encourage patient and family to use good hand hygiene technique  - Identify and instruct in appropriate isolation precautions for identified infection/condition  Outcome: Progressing  Goal: Absence of fever/infection during neutropenic period  Description: INTERVENTIONS:  - Monitor WBC    Outcome: Progressing     Problem: SAFETY ADULT  Goal: Patient will remain free of falls  Description: INTERVENTIONS:  - Educate patient/family on patient safety including physical limitations  - Instruct patient to call for assistance with activity   - Consult OT/PT to assist with strengthening/mobility   - Keep Call bell within reach  - Keep bed low and locked with side rails adjusted as appropriate  - Keep care items and personal belongings within reach  - Initiate and maintain comfort rounds  - Make Fall Risk Sign visible to staff  - Offer Toileting every  Hours, in advance of need  - Initiate/Maintain alarm  - Obtain necessary fall risk management equipment:  - Apply yellow socks and bracelet for high fall risk patients  - Consider moving patient to room near nurses station  Outcome:  Progressing  Goal: Maintain or return to baseline ADL function  Description: INTERVENTIONS:  -  Assess patient's ability to carry out ADLs; assess patient's baseline for ADL function and identify physical deficits which impact ability to perform ADLs (bathing, care of mouth/teeth, toileting, grooming, dressing, etc.)  - Assess/evaluate cause of self-care deficits   - Assess range of motion  - Assess patient's mobility; develop plan if impaired  - Assess patient's need for assistive devices and provide as appropriate  - Encourage maximum independence but intervene and supervise when necessary  - Involve family in performance of ADLs  - Assess for home care needs following discharge   - Consider OT consult to assist with ADL evaluation and planning for discharge  - Provide patient education as appropriate  Outcome: Progressing  Goal: Maintains/Returns to pre admission functional level  Description: INTERVENTIONS:  - Perform AM-PAC 6 Click Basic Mobility/ Daily Activity assessment daily.  - Set and communicate daily mobility goal to care team and patient/family/caregiver.   - Collaborate with rehabilitation services on mobility goals if consulted  - Perform Range of Motion  times a day.  - Reposition patient every  hours.  - Dangle patient  times a day  - Stand patient times a day  - Ambulate patient  times a day  - Out of bed to chair  times a day   - Out of bed for meals  times a day  - Out of bed for toileting  - Record patient progress and toleration of activity level   Outcome: Progressing     Problem: DISCHARGE PLANNING  Goal: Discharge to home or other facility with appropriate resources  Description: INTERVENTIONS:  - Identify barriers to discharge w/patient and caregiver  - Arrange for needed discharge resources and transportation as appropriate  - Identify discharge learning needs (meds, wound care, etc.)  - Arrange for interpretive services to assist at discharge as needed  - Refer to Case Management  Department for coordinating discharge planning if the patient needs post-hospital services based on physician/advanced practitioner order or complex needs related to functional status, cognitive ability, or social support system  Outcome: Progressing

## 2024-06-27 NOTE — PLAN OF CARE
Problem: Knowledge Deficit  Goal: Verbalizes understanding of labor plan  Description: Assess patient/family/caregiver's baseline knowledge level and ability to understand information.  Provide education via patient/family/caregiver's preferred learning method at appropriate level of understanding.     1. Provide teaching at level of understanding.  2. Provide teaching via preferred learning method(s).  Outcome: Progressing  Goal: Patient/family/caregiver demonstrates understanding of disease process, treatment plan, medications, and discharge instructions  Description: Complete learning assessment and assess knowledge base.  Interventions:  - Provide teaching at level of understanding  - Provide teaching via preferred learning methods  Outcome: Progressing     Problem: PAIN - ADULT  Goal: Verbalizes/displays adequate comfort level or baseline comfort level  Description: Interventions:  - Encourage patient to monitor pain and request assistance  - Assess pain using appropriate pain scale  - Administer analgesics based on type and severity of pain and evaluate response  - Implement non-pharmacological measures as appropriate and evaluate response  - Consider cultural and social influences on pain and pain management  - Notify physician/advanced practitioner if interventions unsuccessful or patient reports new pain  Outcome: Progressing     Problem: INFECTION - ADULT  Goal: Absence or prevention of progression during hospitalization  Description: INTERVENTIONS:  - Assess and monitor for signs and symptoms of infection  - Monitor lab/diagnostic results  - Monitor all insertion sites, i.e. indwelling lines, tubes, and drains  - Monitor endotracheal if appropriate and nasal secretions for changes in amount and color  - [place] appropriate cooling/warming therapies per order  - Administer medications as ordered  - Instruct and encourage patient and family to use good hand hygiene technique  - Identify and instruct in  appropriate isolation precautions for identified infection/condition  Outcome: Progressing  Goal: Absence of fever/infection during neutropenic period  Description: INTERVENTIONS:  - Monitor WBC    Outcome: Progressing     Problem: SAFETY ADULT  Goal: Patient will remain free of falls  Description: INTERVENTIONS:  - Educate patient/family on patient safety including physical limitations  - Instruct patient to call for assistance with activity   - Consult OT/PT to assist with strengthening/mobility   - Keep Call bell within reach  - Keep bed low and locked with side rails adjusted as appropriate  - Keep care items and personal belongings within reach  - Initiate and maintain comfort rounds  - Make Fall Risk Sign visible to staff  - Offer Toileting every  Hours, in advance of need  - Initiate/Maintain alarm  - Obtain necessary fall risk management equipment:   - Apply yellow socks and bracelet for high fall risk patients  - Consider moving patient to room near nurses station  Outcome: Progressing  Goal: Maintain or return to baseline ADL function  Description: INTERVENTIONS:  -  Assess patient's ability to carry out ADLs; assess patient's baseline for ADL function and identify physical deficits which impact ability to perform ADLs (bathing, care of mouth/teeth, toileting, grooming, dressing, etc.)  - Assess/evaluate cause of self-care deficits   - Assess range of motion  - Assess patient's mobility; develop plan if impaired  - Assess patient's need for assistive devices and provide as appropriate  - Encourage maximum independence but intervene and supervise when necessary  - Involve family in performance of ADLs  - Assess for home care needs following discharge   - Consider OT consult to assist with ADL evaluation and planning for discharge  - Provide patient education as appropriate  Outcome: Progressing  Goal: Maintains/Returns to pre admission functional level  Description: INTERVENTIONS:  - Perform AM-PAC 6 Click  Basic Mobility/ Daily Activity assessment daily.  - Set and communicate daily mobility goal to care team and patient/family/caregiver.   - Collaborate with rehabilitation services on mobility goals if consulted  - Perform Range of Motion  times a day.  - Reposition patient every  hours.  - Dangle patient  times a day  - Stand patient  times a day  - Ambulate patient  times a day  - Out of bed to chair  times a day   - Out of bed for meals  times a day  - Out of bed for toileting  - Record patient progress and toleration of activity level   Outcome: Progressing     Problem: DISCHARGE PLANNING  Goal: Discharge to home or other facility with appropriate resources  Description: INTERVENTIONS:  - Identify barriers to discharge w/patient and caregiver  - Arrange for needed discharge resources and transportation as appropriate  - Identify discharge learning needs (meds, wound care, etc.)  - Arrange for interpretive services to assist at discharge as needed  - Refer to Case Management Department for coordinating discharge planning if the patient needs post-hospital services based on physician/advanced practitioner order or complex needs related to functional status, cognitive ability, or social support system  Outcome: Progressing     Problem: POSTPARTUM  Goal: Experiences normal postpartum course  Description: INTERVENTIONS:  - Monitor maternal vital signs  - Assess uterine involution and lochia  Outcome: Progressing  Goal: Appropriate maternal -  bonding  Description: INTERVENTIONS:  - Identify family support  - Assess for appropriate maternal/infant bonding   -Encourage maternal/infant bonding opportunities  - Referral to  or  as needed  Outcome: Progressing  Goal: Establishment of infant feeding pattern  Description: INTERVENTIONS:  - Assess breast/bottle feeding  - Refer to lactation as needed  Outcome: Progressing  Goal: Incision(s), wounds(s) or drain site(s) healing without S/S of  infection  Description: INTERVENTIONS  - Assess and document dressing, incision, wound bed, drain sites and surrounding tissue  - Provide patient and family education  - Perform skin care/dressing changes ever  Outcome: Progressing

## 2024-06-28 VITALS
HEART RATE: 90 BPM | TEMPERATURE: 97.6 F | BODY MASS INDEX: 41.65 KG/M2 | WEIGHT: 250 LBS | SYSTOLIC BLOOD PRESSURE: 126 MMHG | HEIGHT: 65 IN | DIASTOLIC BLOOD PRESSURE: 76 MMHG | OXYGEN SATURATION: 100 % | RESPIRATION RATE: 16 BRPM

## 2024-06-28 PROCEDURE — NC001 PR NO CHARGE: Performed by: STUDENT IN AN ORGANIZED HEALTH CARE EDUCATION/TRAINING PROGRAM

## 2024-06-28 PROCEDURE — 99024 POSTOP FOLLOW-UP VISIT: CPT | Performed by: STUDENT IN AN ORGANIZED HEALTH CARE EDUCATION/TRAINING PROGRAM

## 2024-06-28 RX ORDER — OXYCODONE HYDROCHLORIDE 5 MG/1
5 TABLET ORAL EVERY 6 HOURS PRN
Qty: 3 TABLET | Refills: 0 | Status: SHIPPED | OUTPATIENT
Start: 2024-06-28 | End: 2024-07-08

## 2024-06-28 RX ORDER — SENNOSIDES 8.6 MG
8.6 TABLET ORAL 2 TIMES DAILY PRN
Qty: 20 TABLET | Refills: 0 | Status: SHIPPED | OUTPATIENT
Start: 2024-06-28 | End: 2024-07-18

## 2024-06-28 RX ORDER — IBUPROFEN 600 MG/1
600 TABLET ORAL EVERY 6 HOURS
Qty: 30 TABLET | Refills: 0 | Status: SHIPPED | OUTPATIENT
Start: 2024-06-28

## 2024-06-28 RX ORDER — FAMOTIDINE 20 MG/1
20 TABLET, FILM COATED ORAL 2 TIMES DAILY PRN
Start: 2024-06-28

## 2024-06-28 RX ORDER — ACETAMINOPHEN 325 MG/1
975 TABLET ORAL EVERY 6 HOURS SCHEDULED
Qty: 30 TABLET | Refills: 0 | Status: SHIPPED | OUTPATIENT
Start: 2024-06-28 | End: 2024-06-30

## 2024-06-28 RX ORDER — POLYETHYLENE GLYCOL 3350 17 G/17G
17 POWDER, FOR SOLUTION ORAL DAILY
Qty: 14 EACH | Refills: 0 | Status: SHIPPED | OUTPATIENT
Start: 2024-06-28

## 2024-06-28 RX ORDER — BENZOCAINE/MENTHOL 6 MG-10 MG
1 LOZENGE MUCOUS MEMBRANE DAILY PRN
Start: 2024-06-28

## 2024-06-28 RX ADMIN — SIMETHICONE 80 MG: 80 TABLET, CHEWABLE ORAL at 03:42

## 2024-06-28 RX ADMIN — ACETAMINOPHEN 650 MG: 325 TABLET, FILM COATED ORAL at 03:38

## 2024-06-28 RX ADMIN — IBUPROFEN 600 MG: 600 TABLET, FILM COATED ORAL at 08:36

## 2024-06-28 RX ADMIN — BUSPIRONE HYDROCHLORIDE 10 MG: 10 TABLET ORAL at 09:15

## 2024-06-28 RX ADMIN — IBUPROFEN 600 MG: 600 TABLET, FILM COATED ORAL at 16:05

## 2024-06-28 RX ADMIN — ACETAMINOPHEN 650 MG: 325 TABLET, FILM COATED ORAL at 16:05

## 2024-06-28 RX ADMIN — ACETAMINOPHEN 650 MG: 325 TABLET, FILM COATED ORAL at 09:25

## 2024-06-28 RX ADMIN — IBUPROFEN 600 MG: 600 TABLET, FILM COATED ORAL at 02:00

## 2024-06-28 RX ADMIN — OXYCODONE HYDROCHLORIDE 5 MG: 5 TABLET ORAL at 09:20

## 2024-06-28 RX ADMIN — SENNOSIDES 8.6 MG: 8.6 TABLET, FILM COATED ORAL at 08:40

## 2024-06-28 RX ADMIN — ENOXAPARIN SODIUM 40 MG: 40 INJECTION SUBCUTANEOUS at 08:40

## 2024-06-28 NOTE — PLAN OF CARE
Problem: PAIN - ADULT  Goal: Verbalizes/displays adequate comfort level or baseline comfort level  Description: Interventions:  - Encourage patient to monitor pain and request assistance  - Assess pain using appropriate pain scale  - Administer analgesics based on type and severity of pain and evaluate response  - Implement non-pharmacological measures as appropriate and evaluate response  - Consider cultural and social influences on pain and pain management  - Notify physician/advanced practitioner if interventions unsuccessful or patient reports new pain  Outcome: Progressing     Problem: INFECTION - ADULT  Goal: Absence or prevention of progression during hospitalization  Description: INTERVENTIONS:  - Assess and monitor for signs and symptoms of infection  - Monitor lab/diagnostic results  - Monitor all insertion sites, i.e. indwelling lines, tubes, and drains  - Monitor endotracheal if appropriate and nasal secretions for changes in amount and color  - Concepcion appropriate cooling/warming therapies per order  - Administer medications as ordered  - Instruct and encourage patient and family to use good hand hygiene technique  - Identify and instruct in appropriate isolation precautions for identified infection/condition  Outcome: Progressing  Goal: Absence of fever/infection during neutropenic period  Description: INTERVENTIONS:  - Monitor WBC    Outcome: Progressing     Problem: SAFETY ADULT  Goal: Patient will remain free of falls  Description: INTERVENTIONS:  - Educate patient/family on patient safety including physical limitations  - Instruct patient to call for assistance with activity   - Consult OT/PT to assist with strengthening/mobility   - Keep Call bell within reach  - Keep bed low and locked with side rails adjusted as appropriate  - Keep care items and personal belongings within reach  - Initiate and maintain comfort rounds  - Make Fall Risk Sign visible to staff  - Apply yellow socks and bracelet  for high fall risk patients  - Consider moving patient to room near nurses station  Outcome: Progressing  Goal: Maintain or return to baseline ADL function  Description: INTERVENTIONS:  -  Assess patient's ability to carry out ADLs; assess patient's baseline for ADL function and identify physical deficits which impact ability to perform ADLs (bathing, care of mouth/teeth, toileting, grooming, dressing, etc.)  - Assess/evaluate cause of self-care deficits   - Assess range of motion  - Assess patient's mobility; develop plan if impaired  - Assess patient's need for assistive devices and provide as appropriate  - Encourage maximum independence but intervene and supervise when necessary  - Involve family in performance of ADLs  - Assess for home care needs following discharge   - Consider OT consult to assist with ADL evaluation and planning for discharge  - Provide patient education as appropriate  Outcome: Progressing  Goal: Maintains/Returns to pre admission functional level  Description: INTERVENTIONS:  - Perform AM-PAC 6 Click Basic Mobility/ Daily Activity assessment daily.  - Set and communicate daily mobility goal to care team and patient/family/caregiver.   - Collaborate with rehabilitation services on mobility goals if consulted  - Out of bed for toileting  - Record patient progress and toleration of activity level   Outcome: Progressing     Problem: DISCHARGE PLANNING  Goal: Discharge to home or other facility with appropriate resources  Description: INTERVENTIONS:  - Identify barriers to discharge w/patient and caregiver  - Arrange for needed discharge resources and transportation as appropriate  - Identify discharge learning needs (meds, wound care, etc.)  - Arrange for interpretive services to assist at discharge as needed  - Refer to Case Management Department for coordinating discharge planning if the patient needs post-hospital services based on physician/advanced practitioner order or complex needs  related to functional status, cognitive ability, or social support system  Outcome: Progressing     Problem: POSTPARTUM  Goal: Experiences normal postpartum course  Description: INTERVENTIONS:  - Monitor maternal vital signs  - Assess uterine involution and lochia  Outcome: Progressing  Goal: Appropriate maternal -  bonding  Description: INTERVENTIONS:  - Identify family support  - Assess for appropriate maternal/infant bonding   -Encourage maternal/infant bonding opportunities  - Referral to  or  as needed  Outcome: Progressing  Goal: Establishment of infant feeding pattern  Description: INTERVENTIONS:  - Assess breast/bottle feeding  - Refer to lactation as needed  Outcome: Progressing  Goal: Incision(s), wounds(s) or drain site(s) healing without S/S of infection  Description: INTERVENTIONS  - Assess and document dressing, incision, wound bed, drain sites and surrounding tissue  - Provide patient and family education  - Perform skin care/dressing changes  Outcome: Progressing

## 2024-06-28 NOTE — PROGRESS NOTES
"Progress Note - OB/GYN  Elisabet Oropeza 29 y.o. female MRN: [id number]  Unit/Bed#:  311-01 Encounter: [id number]    Assessment and Plan     Elisabet Oropeza is a patient of: [facility] . She is POD# 2 s/p 1LTCS. Recovering well and is stable.      * Status post primary low transverse  section  Assessment & Plan  QBL: 868; Hemoglobin 11.5g/dL --> 9.0g/dL, venofer  Pain regimen: s/p Duramorph; sesar Tylenol, sesar Toradol->Motrin  Continue bowel regimen  VTE chemoprophylaxis: Lovenox 40mg BID  Failed void trial on , cathed for 550, loco replaced  @0500 --> Now voiding spontaneously  Encourage ambulation and breastfeeding/pumping    Chronic hypertension  Assessment & Plan  First elevated BP of pregnancy at 12w0d  Subsequent mild range BP at 20w5d, 32w0d  CBC/CMP wnl; p:c ratio: 0.09  Systolic (12hrs), Av , Min:124 , Max:137   Diastolic (12hrs), Av, Min:69, Max:85          Disposition    -  Anticipate discharge home on POD# 3-4      Subjective/Objective     Chief Complaint: Postpartum State     Subjective:    Elisabet Oropeza is POD#2 s/p 1LTCS.  Patient complains of expected soreness with movement. She also feels her legs are swollen, not painful. Patient is ambulating without difficulty, and voiding spontaneously.  Patient is currently passing flatus, tolerating PO diet, and denies nausea or vomitting. Patient denies fever, chills, chest pain, shortness of breath, or calf tenderness. Lochia is normal. She is Bottle feeding. She is recovering well and is stable.       Vitals:   /76 (BP Location: Left arm)   Pulse 72   Temp 98.3 °F (36.8 °C) (Oral)   Resp 17   Ht 5' 5\" (1.651 m)   Wt 113 kg (250 lb)   LMP 2023 (Approximate)   SpO2 98%   Breastfeeding No   BMI 41.60 kg/m²       Intake/Output Summary (Last 24 hours) at 2024 0649  Last data filed at 2024 1928  Gross per 24 hour   Intake --   Output 1350 ml   Net -1350 ml         Physical Exam:   GEN: Elisabet BARTON" Johnna appears well, alert and oriented x 3, pleasant and cooperative   CARDIO: RRR, no murmurs or rubs  RESP:  CTAB, no wheezes or rales  ABDOMEN: soft, no tenderness, no distention, fundus firm and @ below umbilicus, Incision C/D/I  EXTREMITIES: SCDs on, non tender, no erythema. No pitting edema    Labs:     Hemoglobin   Date Value Ref Range Status   06/27/2024 9.0 (L) 11.5 - 15.4 g/dL Final   06/25/2024 11.5 11.5 - 15.4 g/dL Final   05/02/2014 14.4 11.5 - 15.4 g/dL Final     Comment:     New Reference Range     WBC   Date Value Ref Range Status   06/27/2024 16.02 (H) 4.31 - 10.16 Thousand/uL Final   06/25/2024 9.43 4.31 - 10.16 Thousand/uL Final   05/02/2014 14.41 (H) 4.31 - 10.16 Thousand/uL Final     Platelets   Date Value Ref Range Status   06/27/2024 149 149 - 390 Thousands/uL Final   06/25/2024 168 149 - 390 Thousands/uL Final   05/02/2014 206 149 - 390 Thousand/uL Final     Comment:     New Reference Range     Creatinine   Date Value Ref Range Status   06/25/2024 0.66 0.60 - 1.30 mg/dL Final     Comment:     Standardized to IDMS reference method   02/01/2024 0.51 (L) 0.60 - 1.30 mg/dL Final     Comment:     Standardized to IDMS reference method   05/02/2014 0.83 0.60 - 1.30 mg/dL Final     Comment:     Standardized to IDMS reference method     AST   Date Value Ref Range Status   06/25/2024 10 (L) 13 - 39 U/L Final   02/01/2024 10 (L) 13 - 39 U/L Final   07/29/2023 17 10 - 30 U/L Final   06/18/2022 12 10 - 30 U/L Final     ALT   Date Value Ref Range Status   06/25/2024 6 (L) 7 - 52 U/L Final     Comment:     Specimen collection should occur prior to Sulfasalazine administration due to the potential for falsely depressed results.    02/01/2024 8 7 - 52 U/L Final     Comment:     Specimen collection should occur prior to Sulfasalazine administration due to the potential for falsely depressed results.    07/29/2023 22 6 - 29 U/L Final   06/18/2022 11 6 - 29 U/L Final          Nyasia Villa MD  6/28/2024  6:49  AM

## 2024-06-28 NOTE — PLAN OF CARE
Problem: POSTPARTUM  Goal: Experiences normal postpartum course  Description: INTERVENTIONS:  - Monitor maternal vital signs  - Assess uterine involution and lochia  Outcome: Progressing  Goal: Appropriate maternal -  bonding  Description: INTERVENTIONS:  - Identify family support  - Assess for appropriate maternal/infant bonding   -Encourage maternal/infant bonding opportunities  - Referral to  or  as needed  Outcome: Progressing  Goal: Establishment of infant feeding pattern  Description: INTERVENTIONS:  - Assess breast/bottle feeding  - Refer to lactation as needed  Outcome: Progressing  Goal: Incision(s), wounds(s) or drain site(s) healing without S/S of infection  Description: INTERVENTIONS  - Assess and document dressing, incision, wound bed, drain sites and surrounding tissue  - Provide patient and family education  - Perform skin care/dressing changes  Outcome: Progressing

## 2024-06-28 NOTE — PLAN OF CARE
Problem: POSTPARTUM  Goal: Experiences normal postpartum course  Description: INTERVENTIONS:  - Monitor maternal vital signs  - Assess uterine involution and lochia  2024 by Rena Black RN  Outcome: Completed  2024 by Rena Black RN  Outcome: Progressing  Goal: Appropriate maternal -  bonding  Description: INTERVENTIONS:  - Identify family support  - Assess for appropriate maternal/infant bonding   -Encourage maternal/infant bonding opportunities  - Referral to  or  as needed  2024 by Rena Black RN  Outcome: Completed  2024 by Rena Black RN  Outcome: Progressing  Goal: Establishment of infant feeding pattern  Description: INTERVENTIONS:  - Assess breast/bottle feeding  - Refer to lactation as needed  2024 by Rena Black RN  Outcome: Completed  2024 by Rena Black RN  Outcome: Progressing  Goal: Incision(s), wounds(s) or drain site(s) healing without S/S of infection  Description: INTERVENTIONS  - Assess and document dressing, incision, wound bed, drain sites and surrounding tissue  - Provide patient and family education  - Perform skin care/dressing changes  2024 by Rena Black RN  Outcome: Completed  2024 by Rena Black RN  Outcome: Progressing

## 2024-06-29 ENCOUNTER — HOSPITAL ENCOUNTER (EMERGENCY)
Facility: HOSPITAL | Age: 29
Discharge: HOME | End: 2024-06-29
Attending: EMERGENCY MEDICINE
Payer: COMMERCIAL

## 2024-06-29 ENCOUNTER — NURSE TRIAGE (OUTPATIENT)
Dept: OTHER | Facility: OTHER | Age: 29
End: 2024-06-29

## 2024-06-29 VITALS
SYSTOLIC BLOOD PRESSURE: 124 MMHG | OXYGEN SATURATION: 97 % | RESPIRATION RATE: 18 BRPM | HEART RATE: 80 BPM | DIASTOLIC BLOOD PRESSURE: 76 MMHG | TEMPERATURE: 98.1 F

## 2024-06-29 DIAGNOSIS — R10.9 FLANK PAIN: ICD-10-CM

## 2024-06-29 DIAGNOSIS — R30.0 DYSURIA: Primary | ICD-10-CM

## 2024-06-29 DIAGNOSIS — I10 CHRONIC HYPERTENSION: ICD-10-CM

## 2024-06-29 LAB
ALBUMIN SERPL BCG-MCNC: 2.9 G/DL (ref 3.5–5)
ALP SERPL-CCNC: 87 U/L (ref 34–104)
ALT SERPL W P-5'-P-CCNC: 35 U/L (ref 7–52)
ANION GAP SERPL CALCULATED.3IONS-SCNC: 6 MMOL/L (ref 4–13)
APTT PPP: 30 SECONDS (ref 23–37)
AST SERPL W P-5'-P-CCNC: 37 U/L (ref 13–39)
BACTERIA UR QL AUTO: ABNORMAL /HPF
BASOPHILS # BLD AUTO: 0.03 THOUSANDS/ÂΜL (ref 0–0.1)
BASOPHILS NFR BLD AUTO: 0 % (ref 0–1)
BILIRUB SERPL-MCNC: 0.22 MG/DL (ref 0.2–1)
BILIRUB UR QL STRIP: NEGATIVE
BUN SERPL-MCNC: 11 MG/DL (ref 5–25)
CALCIUM ALBUM COR SERPL-MCNC: 9 MG/DL (ref 8.3–10.1)
CALCIUM SERPL-MCNC: 8.1 MG/DL (ref 8.4–10.2)
CHLORIDE SERPL-SCNC: 108 MMOL/L (ref 96–108)
CLARITY UR: CLEAR
CO2 SERPL-SCNC: 25 MMOL/L (ref 21–32)
COLOR UR: COLORLESS
CREAT SERPL-MCNC: 0.86 MG/DL (ref 0.6–1.3)
CREAT UR-MCNC: 30.7 MG/DL
EOSINOPHIL # BLD AUTO: 0.18 THOUSAND/ÂΜL (ref 0–0.61)
EOSINOPHIL NFR BLD AUTO: 2 % (ref 0–6)
ERYTHROCYTE [DISTWIDTH] IN BLOOD BY AUTOMATED COUNT: 16.4 % (ref 11.6–15.1)
GFR SERPL CREATININE-BSD FRML MDRD: 92 ML/MIN/1.73SQ M
GLUCOSE SERPL-MCNC: 84 MG/DL (ref 65–140)
GLUCOSE UR STRIP-MCNC: NEGATIVE MG/DL
HCT VFR BLD AUTO: 27.5 % (ref 34.8–46.1)
HGB BLD-MCNC: 8.7 G/DL (ref 11.5–15.4)
HGB UR QL STRIP.AUTO: ABNORMAL
IMM GRANULOCYTES # BLD AUTO: 0.13 THOUSAND/UL (ref 0–0.2)
IMM GRANULOCYTES NFR BLD AUTO: 2 % (ref 0–2)
INR PPP: 0.88 (ref 0.84–1.19)
KETONES UR STRIP-MCNC: NEGATIVE MG/DL
LEUKOCYTE ESTERASE UR QL STRIP: ABNORMAL
LIPASE SERPL-CCNC: 19 U/L (ref 11–82)
LYMPHOCYTES # BLD AUTO: 2.49 THOUSANDS/ÂΜL (ref 0.6–4.47)
LYMPHOCYTES NFR BLD AUTO: 29 % (ref 14–44)
MCH RBC QN AUTO: 31 PG (ref 26.8–34.3)
MCHC RBC AUTO-ENTMCNC: 31.6 G/DL (ref 31.4–37.4)
MCV RBC AUTO: 98 FL (ref 82–98)
MONOCYTES # BLD AUTO: 0.44 THOUSAND/ÂΜL (ref 0.17–1.22)
MONOCYTES NFR BLD AUTO: 5 % (ref 4–12)
MUCOUS THREADS UR QL AUTO: ABNORMAL
NEUTROPHILS # BLD AUTO: 5.48 THOUSANDS/ÂΜL (ref 1.85–7.62)
NEUTS SEG NFR BLD AUTO: 62 % (ref 43–75)
NITRITE UR QL STRIP: NEGATIVE
NON-SQ EPI CELLS URNS QL MICRO: ABNORMAL /HPF
NRBC BLD AUTO-RTO: 0 /100 WBCS
PH UR STRIP.AUTO: 7 [PH]
PLATELET # BLD AUTO: 198 THOUSANDS/UL (ref 149–390)
PMV BLD AUTO: 10.8 FL (ref 8.9–12.7)
POTASSIUM SERPL-SCNC: 4 MMOL/L (ref 3.5–5.3)
PROT SERPL-MCNC: 5.3 G/DL (ref 6.4–8.4)
PROT UR STRIP-MCNC: NEGATIVE MG/DL
PROT UR-MCNC: 20 MG/DL
PROT/CREAT UR: 0.65 MG/G{CREAT} (ref 0–0.1)
PROTHROMBIN TIME: 12.6 SECONDS (ref 11.6–14.5)
RBC # BLD AUTO: 2.81 MILLION/UL (ref 3.81–5.12)
RBC #/AREA URNS AUTO: ABNORMAL /HPF
SODIUM SERPL-SCNC: 139 MMOL/L (ref 135–147)
SP GR UR STRIP.AUTO: 1 (ref 1–1.03)
UROBILINOGEN UR STRIP-ACNC: <2 MG/DL
WBC # BLD AUTO: 8.75 THOUSAND/UL (ref 4.31–10.16)
WBC #/AREA URNS AUTO: ABNORMAL /HPF

## 2024-06-29 PROCEDURE — 84156 ASSAY OF PROTEIN URINE: CPT | Performed by: EMERGENCY MEDICINE

## 2024-06-29 PROCEDURE — 87186 SC STD MICRODIL/AGAR DIL: CPT | Performed by: EMERGENCY MEDICINE

## 2024-06-29 PROCEDURE — 82570 ASSAY OF URINE CREATININE: CPT | Performed by: EMERGENCY MEDICINE

## 2024-06-29 PROCEDURE — 85730 THROMBOPLASTIN TIME PARTIAL: CPT | Performed by: EMERGENCY MEDICINE

## 2024-06-29 PROCEDURE — 36415 COLL VENOUS BLD VENIPUNCTURE: CPT | Performed by: EMERGENCY MEDICINE

## 2024-06-29 PROCEDURE — 85610 PROTHROMBIN TIME: CPT | Performed by: EMERGENCY MEDICINE

## 2024-06-29 PROCEDURE — 81001 URINALYSIS AUTO W/SCOPE: CPT | Performed by: EMERGENCY MEDICINE

## 2024-06-29 PROCEDURE — 80053 COMPREHEN METABOLIC PANEL: CPT | Performed by: EMERGENCY MEDICINE

## 2024-06-29 PROCEDURE — 87077 CULTURE AEROBIC IDENTIFY: CPT | Performed by: EMERGENCY MEDICINE

## 2024-06-29 PROCEDURE — 99284 EMERGENCY DEPT VISIT MOD MDM: CPT

## 2024-06-29 PROCEDURE — 85025 COMPLETE CBC W/AUTO DIFF WBC: CPT | Performed by: EMERGENCY MEDICINE

## 2024-06-29 PROCEDURE — 83690 ASSAY OF LIPASE: CPT | Performed by: EMERGENCY MEDICINE

## 2024-06-29 PROCEDURE — 99284 EMERGENCY DEPT VISIT MOD MDM: CPT | Performed by: EMERGENCY MEDICINE

## 2024-06-29 PROCEDURE — 87086 URINE CULTURE/COLONY COUNT: CPT | Performed by: EMERGENCY MEDICINE

## 2024-06-29 NOTE — TELEPHONE ENCOUNTER
"Reason for Disposition  • Patient sounds very sick or weak to the triager    Answer Assessment - Initial Assessment Questions  1. SYMPTOM: \"What's the main symptom you're concerned about?\" (e.g., pain, fever, vomiting)      Patient reports she came home from hospital yesterday; started having full body chills last night (no fever), when she lays down, pain in the middle of her back that brings on her chills    2. ONSET: \"When did chills  start?\"      Last night around midnight    3. SURGERY: \"What surgery was performed?\"       SECTION () (Uterus)    4. DATE of SURGERY: \"When was surgery performed?\"           5. ANESTHESIA: \" What type of anesthesia did you have?\" (e.g., general, spinal, epidural, local)      Epidural    6. PAIN: \"Is there any pain?\" If Yes, ask: \"How bad is it?\"  (Scale 1-10; or mild, moderate, severe)      6/10 - sitting up helps; just taking Tylenol and Ibuprofen    7. FEVER: \"Do you have a fever?\" If Yes, ask: \"What is your temperature, how was it measured, and when did it start?\"      Denies    8. VOMITING: \"Is there any vomiting?\" If yes, ask: \"How many times?\"      Denies    9. BLEEDING: \"Is there any bleeding?\" If Yes, ask: \"How much?\" and \"Where?\"      Bleeding fine all night; had a slightly larger clot when urinating this morning.    10. OTHER SYMPTOMS: \"Do you have any other symptoms?\" (e.g., drainage from wound, painful urination, constipation)        Denies urinary symptoms; reports that she did have a bowel movement last night with chills.    Protocols used: Post-Op Symptoms and Questions-ADULT-    "

## 2024-06-30 NOTE — ED PROVIDER NOTES
History  Chief Complaint   Patient presents with    Postpartum Complications     Patient reports having  on Tuesday. Reports beginning with back pain last night. States she had urinary catheter placed during labor. Reports burning w/ urination. States she has also had elevated BPs the last few days. Denies PITTS      Patient is a 29-year-old female who is recently postpartum and presents with back pain and dysuria.  Patient states that she delivered her daughter on  via .  She does have a prior history of chronic hypertension but was not diagnosed with preeclampsia.  She is not on any antihypertensives.  She did note elevated blood pressure prior to arrival and her initial triage blood pressure was greater than 140 systolic.  She describes dysuria and right flank pain that started today.  She also describes epigastric pain after eating today, but this has resolved prior to arrival.  She describes mild vaginal bleeding which seems improved from previous and denies any new vaginal discharge.  She states that the  scar appears to be healing well and her abdominal pain is improving each day.  She denies fever, chills, other concerns.  He did have a [place] catheter during her hospital stay.      History provided by:  Patient  Difficulty Urinating  Duration:  1 day  Chronicity:  New  Recent urinary tract infections: no    Associated symptoms: abdominal pain (improving after surgical procedure) and flank pain    Associated symptoms: no fever, no nausea, no vaginal discharge and no vomiting        Prior to Admission Medications   Prescriptions Last Dose Informant Patient Reported? Taking?   acetaminophen (TYLENOL) 325 mg tablet   No No   Sig: Take 3 tablets (975 mg total) by mouth every 6 (six) hours for 8 doses   benzocaine-menthol-lanolin-aloe (DERMOPLAST) 20-0.5 % topical spray   No No   Sig: Apply 1 Application topically 4 (four) times a day as needed for mild pain or irritation   busPIRone  (BUSPAR) 5 mg tablet  Self No No   Si TABLETS TWICE DAILY   famotidine (PEPCID) 20 mg tablet   No No   Sig: Take 1 tablet (20 mg total) by mouth 2 (two) times a day as needed for heartburn   hydrocortisone 1 % cream   No No   Sig: Apply 1 Application topically daily as needed for irritation or rash   ibuprofen (MOTRIN) 600 mg tablet   No No   Sig: Take 1 tablet (600 mg total) by mouth every 6 (six) hours   oxyCODONE (Roxicodone) 5 immediate release tablet   No No   Sig: Take 1 tablet (5 mg total) by mouth every 6 (six) hours as needed for severe pain for up to 10 days Max Daily Amount: 20 mg   polyethylene glycol (MIRALAX) 17 g packet   No No   Sig: Take 17 g by mouth daily   senna (SENOKOT) 8.6 mg   No No   Sig: Take 1 tablet (8.6 mg total) by mouth 2 (two) times a day as needed for constipation for up to 20 days      Facility-Administered Medications: None       Past Medical History:   Diagnosis Date    Anxiety     GERD (gastroesophageal reflux disease)     Headache(784.0)     Stress headaches    Ovarian cyst     last assessed 2014    Varicella     had vaccine       Past Surgical History:   Procedure Laterality Date     SECTION  2024    WISDOM TOOTH EXTRACTION  2018       Family History   Problem Relation Age of Onset    Gallbladder disease Mother     Migraines Mother     Hypertension Father     Asthma Father     Endometriosis Sister         Partial Hyst    Ulcerative colitis Maternal Grandmother     Other Maternal Grandfather         cardiac disorder     I have reviewed and agree with the history as documented.    E-Cigarette/Vaping    E-Cigarette Use Never User      E-Cigarette/Vaping Substances    Nicotine No     THC No     CBD No     Flavoring No     Other No     Unknown No      Social History     Tobacco Use    Smoking status: Never     Passive exposure: Never    Smokeless tobacco: Never   Vaping Use    Vaping status: Never Used   Substance Use Topics    Alcohol use: Not Currently      Alcohol/week: 2.0 standard drinks of alcohol     Types: 2 Glasses of wine per week     Comment: I drink socially    Drug use: Never       Review of Systems   Constitutional:  Negative for chills, diaphoresis and fever.   HENT:  Negative for nosebleeds, sore throat and trouble swallowing.    Eyes:  Negative for photophobia, pain and visual disturbance.   Respiratory:  Negative for cough, chest tightness and shortness of breath.    Cardiovascular:  Positive for leg swelling. Negative for chest pain and palpitations.   Gastrointestinal:  Positive for abdominal pain (improving after surgical procedure). Negative for constipation, diarrhea, nausea and vomiting.   Endocrine: Negative for polydipsia and polyuria.   Genitourinary:  Positive for dysuria and flank pain. Negative for difficulty urinating, hematuria, pelvic pain, vaginal bleeding and vaginal discharge.   Musculoskeletal:  Negative for back pain, neck pain and neck stiffness.   Skin:  Negative for pallor and rash.   Neurological:  Negative for dizziness, seizures, light-headedness and headaches.   All other systems reviewed and are negative.      Physical Exam  Physical Exam  Vitals and nursing note reviewed.   Constitutional:       General: She is not in acute distress.     Appearance: She is well-developed.   HENT:      Head: Normocephalic and atraumatic.      Mouth/Throat:      Mouth: Oropharynx is clear and moist and mucous membranes are normal.   Eyes:      Extraocular Movements: EOM normal.      Pupils: Pupils are equal, round, and reactive to light.   Cardiovascular:      Rate and Rhythm: Normal rate and regular rhythm.      Pulses: Normal pulses.      Heart sounds: Normal heart sounds.   Pulmonary:      Effort: Pulmonary effort is normal. No respiratory distress.      Breath sounds: Normal breath sounds.   Abdominal:      General: There is no distension.      Palpations: Abdomen is soft. Abdomen is not rigid.      Tenderness: There is abdominal  tenderness. There is no right CVA tenderness, left CVA tenderness, guarding or rebound.      Comments: Low transverse incision with no wound dehiscence, erythema or wound discharge.  No subcutaneous masses or induration.  Minimal tenderness to palpation in the lower abdomen.   Musculoskeletal:         General: No tenderness or edema. Normal range of motion.      Cervical back: Normal range of motion and neck supple.   Lymphadenopathy:      Cervical: No cervical adenopathy.   Skin:     General: Skin is warm and dry.      Capillary Refill: Capillary refill takes less than 2 seconds.   Neurological:      Mental Status: She is alert and oriented to person, place, and time.      Cranial Nerves: No cranial nerve deficit.      Sensory: No sensory deficit.   Psychiatric:         Mood and Affect: Mood and affect normal.         Vital Signs  ED Triage Vitals   Temperature Pulse Respirations Blood Pressure SpO2   06/29/24 2015 06/29/24 2015 06/29/24 2015 06/29/24 2015 06/29/24 2015   98.1 °F (36.7 °C) 90 20 148/79 100 %      Temp Source Heart Rate Source Patient Position - Orthostatic VS BP Location FiO2 (%)   06/29/24 2015 06/29/24 2130 06/29/24 2130 06/29/24 2015 --   Oral Monitor Sitting Right arm       Pain Score       --                  Vitals:    06/29/24 2029 06/29/24 2100 06/29/24 2130 06/29/24 2200   BP: 122/81 128/83 115/64 124/76   Pulse:  74 75 80   Patient Position - Orthostatic VS:   Sitting Sitting         Visual Acuity      ED Medications  Medications - No data to display    Diagnostic Studies  Results Reviewed       Procedure Component Value Units Date/Time    Urine culture [[id number]] Collected: 06/29/24 2229    Lab Status: In process Specimen: Urine Updated: 06/29/24 2229    Urine Microscopic [[id number]]  (Abnormal) Collected: 06/29/24 2044    Lab Status: Final result Specimen: Urine, Clean Catch Updated: 06/29/24 2140     RBC, UA 10-20 /hpf      WBC, UA 30-50 /hpf      Epithelial Cells Occasional /hpf       Bacteria, UA Occasional /hpf      MUCUS THREADS Occasional    Comprehensive metabolic panel [[id number]]  (Abnormal) Collected: 06/29/24 2051    Lab Status: Final result Specimen: Blood from Arm, Right Updated: 06/29/24 2124     Sodium 139 mmol/L      Potassium 4.0 mmol/L      Chloride 108 mmol/L      CO2 25 mmol/L      ANION GAP 6 mmol/L      BUN 11 mg/dL      Creatinine 0.86 mg/dL      Glucose 84 mg/dL      Calcium 8.1 mg/dL      Corrected Calcium 9.0 mg/dL      AST 37 U/L      ALT 35 U/L      Alkaline Phosphatase 87 U/L      Total Protein 5.3 g/dL      Albumin 2.9 g/dL      Total Bilirubin 0.22 mg/dL      eGFR 92 ml/min/1.73sq m     Narrative:      [employer] guidelines for Chronic Kidney Disease (CKD):     Stage 1 with normal or high GFR (GFR > 90 mL/min/1.73 square meters)    Stage 2 Mild CKD (GFR = 60-89 mL/min/1.73 square meters)    Stage 3A Moderate CKD (GFR = 45-59 mL/min/1.73 square meters)    Stage 3B Moderate CKD (GFR = 30-44 mL/min/1.73 square meters)    Stage 4 Severe CKD (GFR = 15-29 mL/min/1.73 square meters)    Stage 5 End Stage CKD (GFR <15 mL/min/1.73 square meters)  Note: GFR calculation is accurate only with a steady state creatinine    Lipase [[id number]]  (Normal) Collected: 06/29/24 2051    Lab Status: Final result Specimen: Blood from Arm, Right Updated: 06/29/24 2124     Lipase 19 u/L     Protime-INR [[id number]]  (Normal) Collected: 06/29/24 2051    Lab Status: Final result Specimen: Blood from Arm, Right Updated: 06/29/24 2121     Protime 12.6 seconds      INR 0.88    APTT [[id number]]  (Normal) Collected: 06/29/24 2051    Lab Status: Final result Specimen: Blood from Arm, Right Updated: 06/29/24 2121     PTT 30 seconds     UA (URINE) with reflex to Scope [[id number]]  (Abnormal) Collected: 06/29/24 2044    Lab Status: Final result Specimen: Urine, Clean Catch Updated: 06/29/24 2120     Color, UA Colorless     Clarity, UA Clear     Specific Gravity, UA 1.005     pH, UA  7.0     Leukocytes, UA Large     Nitrite, UA Negative     Protein, UA Negative mg/dl      Glucose, UA Negative mg/dl      Ketones, UA Negative mg/dl      Urobilinogen, UA <2.0 mg/dl      Bilirubin, UA Negative     Occult Blood, UA Large    Protein / creatinine ratio, urine [[id number]]  (Abnormal) Collected: 06/29/24 2045    Lab Status: Final result Specimen: Urine, Clean Catch Updated: 06/29/24 2116     Creatinine, Ur 30.7 mg/dL      Protein Urine Random 20 mg/dL      Prot/Creat Ratio, Ur 0.65    CBC and differential [[id number]]  (Abnormal) Collected: 06/29/24 2051    Lab Status: Final result Specimen: Blood from Arm, Right Updated: 06/29/24 2103     WBC 8.75 Thousand/uL      RBC 2.81 Million/uL      Hemoglobin 8.7 g/dL      Hematocrit 27.5 %      MCV 98 fL      MCH 31.0 pg      MCHC 31.6 g/dL      RDW 16.4 %      MPV 10.8 fL      Platelets 198 Thousands/uL      nRBC 0 /100 WBCs      Segmented % 62 %      Immature Grans % 2 %      Lymphocytes % 29 %      Monocytes % 5 %      Eosinophils Relative 2 %      Basophils Relative 0 %      Absolute Neutrophils 5.48 Thousands/µL      Absolute Immature Grans 0.13 Thousand/uL      Absolute Lymphocytes 2.49 Thousands/µL      Absolute Monocytes 0.44 Thousand/µL      Eosinophils Absolute 0.18 Thousand/µL      Basophils Absolute 0.03 Thousands/µL                    No orders to display              Procedures  Procedures         ED Course  ED Course as of 06/30/24 1755   Sat Jun 29, 2024 2056 Blood Pressure: 122/81  Blood pressure improved without intervention.   2119 Bedside ultrasound reveals normal-appearing gallbladder without evidence of cholelithiasis, gallbladder wall thickening, pericholecystic fluid.  No hydronephrosis on ultrasound of right kidney.   2146 Message sent to OB resident   2216 Spoke with OB resident, Dr Vernon, who recommends sending a urine culture and not treating for UTI at this point.  She also believes that protein to creatinine ratio was a false  positive given the fact that she is bleeding.  She is not concerned with preeclampsia at this point and believes that is chronic hypertension, for which she is followed.  She is appropriate for discharge and outpatient follow-up.  Patient is also comfortable with this plan.  She remains comfortable and in no distress.  She agrees to return to ED sooner if symptoms worsen.                               SBIRT 20yo+      Flowsheet Row Most Recent Value   Initial Alcohol Screen: US AUDIT-C     1. How often do you have a drink containing alcohol? 0 Filed at: 06/29/2024 2014   2. How many drinks containing alcohol do you have on a typical day you are drinking?  0 Filed at: 06/29/2024 2014   3a. Male UNDER 65: How often do you have five or more drinks on one occasion? 0 Filed at: 06/29/2024 2014   3b. FEMALE Any Age, or MALE 65+: How often do you have 4 or more drinks on one occassion? 0 Filed at: 06/29/2024 2014   Audit-C Score 0 Filed at: 06/29/2024 2014   EDY: How many times in the past year have you...    Used an illegal drug or used a prescription medication for non-medical reasons? Never Filed at: 06/29/2024 2014                      Medical Decision Making  Patient presents with dysuria and flank pain.  Urinalysis reveals WBCs but only occasional bacteria and occasional epithelial cells.  It is unclear whether this is a true urinary tract infection or contamination.  I discussed this with OB/GYN who recommends sending a urine culture and not treating with antibiotics at this time.  Patient does not appear systemically ill and I do not suspect pyelonephritis.  She did not have any CVA tenderness.  Possible musculoskeletal back pain.  Dysuria may be secondary to recent [place] catheter.  Patient's initial blood pressure was elevated, but it has improved without intervention.  Her protein to creatinine ratio is falsely elevated due to current vaginal bleeding.  I discussed this with OB/GYN resident who is not concerned  "for preeclampsia and patient is appropriate for discharge with outpatient follow-up.  Patient has scheduled follow-up next week and she is comfortable with discharge at this time.  She is very well-appearing and stable for discharge.    Portions of the above record have been created with voice recognition software.  Occasional wrong word or \"sound alike\" substitutions may have occurred due to the inherent limitations of voice recognition software.  Read the chart carefully and recognize, using context, where substitutions may have occurred.      Problems Addressed:  Chronic hypertension:     Details: No evidence of preeclampsia.  Patient will follow-up with OB/GYN as outpatient.  No indication for antihypertensives at this time.  Dysuria:     Details: Urinalysis is not definitive.  Possible contamination.  OB/GYN recommends urine culture prior to treating.  Patient is comfortable with this plan and will return to ED if symptoms worsen.  Flank pain:     Details: Low suspicion for pyelonephritis.  No rash.  Possible musculoskeletal back pain. Do not suspect acute surgical process including but not limited to acute cholecystitis, acute appendicitis, SBO, mesenteric ischemia, AAA, vascular dissection, vascular occlusion, perforated viscus. Do not suspect intrathoracic cause of abdominal pain. Symptoms improved and patient is tolerating po. Patient advised to return to ED if symptoms worsen or persist. Patient also advised to follow up with PCP, OB.     Amount and/or Complexity of Data Reviewed  External Data Reviewed: notes.  Labs: ordered.    Risk  OTC drugs.             Disposition  Final diagnoses:   Dysuria   Flank pain   Chronic hypertension     Time reflects when diagnosis was documented in both MDM as applicable and the Disposition within this note       Time User Action Codes Description Comment    6/29/2024 10:27 PM Luiz Rosas Add [R30.0] Dysuria     6/29/2024 10:27 PM Luiz Rosas Add [R10.9] Flank " pain     6/29/2024 10:27 PM Luiz Rosas Add [I10] Chronic hypertension           ED Disposition       ED Disposition   Discharge    Condition   Stable    Date/Time   Sat Jun 29, 2024 2227    Comment   Elisabet Oropeza discharge to home/self care.                   Follow-up Information       Follow up With Specialties Details Why Contact Info    Suha Irwin MD Obstetrics and Gynecology, Obstetrics, Gynecology  Follow-up next week as scheduled.  Return to ED sooner if pain worsens or you develop fever, chills, vomiting, headache, other concerns. [address] [place] [address]  [phone]              Discharge Medication List as of 6/29/2024 10:28 PM        CONTINUE these medications which have NOT CHANGED    Details   acetaminophen (TYLENOL) 325 mg tablet Take 3 tablets (975 mg total) by mouth every 6 (six) hours for 8 doses, Starting Fri 6/28/2024, Until Sun 6/30/2024, Normal      benzocaine-menthol-lanolin-aloe (DERMOPLAST) 20-0.5 % topical spray Apply 1 Application topically 4 (four) times a day as needed for mild pain or irritation, Starting Fri 6/28/2024, No Print      busPIRone (BUSPAR) 5 mg tablet 2 TABLETS TWICE DAILY, Fill Later      famotidine (PEPCID) 20 mg tablet Take 1 tablet (20 mg total) by mouth 2 (two) times a day as needed for heartburn, Starting Fri 6/28/2024, No Print      hydrocortisone 1 % cream Apply 1 Application topically daily as needed for irritation or rash, Starting Fri 6/28/2024, No Print      ibuprofen (MOTRIN) 600 mg tablet Take 1 tablet (600 mg total) by mouth every 6 (six) hours, Starting Fri 6/28/2024, Normal      oxyCODONE (Roxicodone) 5 immediate release tablet Take 1 tablet (5 mg total) by mouth every 6 (six) hours as needed for severe pain for up to 10 days Max Daily Amount: 20 mg, Starting Fri 6/28/2024, Until Mon 7/8/2024 at 2359, Normal      polyethylene glycol (MIRALAX) 17 g packet Take 17 g by mouth daily, Starting Fri 6/28/2024, Normal      senna  (SENOKOT) 8.6 mg Take 1 tablet (8.6 mg total) by mouth 2 (two) times a day as needed for constipation for up to 20 days, Starting Fri 6/28/2024, Until Thu 7/18/2024 at 2359, Normal             No discharge procedures on file.    PDMP Review         Value Time User    PDMP Reviewed  Yes 6/28/2024  3:26 PM Sangeeta Pritchett MD            ED Provider  Electronically Signed by             Luiz Rosas DO  06/30/24 2090

## 2024-07-01 ENCOUNTER — TRANSITIONAL CARE MANAGEMENT (OUTPATIENT)
Dept: FAMILY MEDICINE CLINIC | Facility: CLINIC | Age: 29
End: 2024-07-01

## 2024-07-02 LAB
BACTERIA UR CULT: ABNORMAL
PLACENTA IN STORAGE: NORMAL

## 2024-07-05 ENCOUNTER — OFFICE VISIT (OUTPATIENT)
Dept: OBGYN CLINIC | Facility: CLINIC | Age: 29
End: 2024-07-05

## 2024-07-05 VITALS
DIASTOLIC BLOOD PRESSURE: 86 MMHG | SYSTOLIC BLOOD PRESSURE: 122 MMHG | BODY MASS INDEX: 38.15 KG/M2 | HEIGHT: 65 IN | WEIGHT: 229 LBS

## 2024-07-05 DIAGNOSIS — Z98.891 STATUS POST PRIMARY LOW TRANSVERSE CESAREAN SECTION: Primary | Chronic | ICD-10-CM

## 2024-07-05 DIAGNOSIS — R30.0 DYSURIA: ICD-10-CM

## 2024-07-05 PROCEDURE — 99024 POSTOP FOLLOW-UP VISIT: CPT | Performed by: STUDENT IN AN ORGANIZED HEALTH CARE EDUCATION/TRAINING PROGRAM

## 2024-07-05 NOTE — PROGRESS NOTES
"[facility]  Incision Check  Antonieta Hatch MD  24    Assessment/Plan:   29-year-old  postop day 9 from a primary  delivery in the setting of arrest of descent-overall recovering well postoperatively.  We reviewed continued postpartum/postoperative precautions and restrictions.  We discussed wound care  We reviewed the low amount of bacterial load noted in her urine culture and setting that her urinary symptoms have since improved-plan to repeat UA/urine culture.     Diagnoses and all orders for this visit:    Dysuria  -     Urinalysis with microscopic; Future  -     Urine culture          Subjective:     Patient ID: Elisabet Oropeza is a 29 y.o. female.    HPI  Elisabet is a 29-year-old G1, P1 postop day 9 from a primary  delivery for arrest of descent in the setting of concern for fetal macrosomia-she delivered a female , 7 pounds 6.7 ounces, Apgars 9/9.  She reports overall feeling well-some incisional and abdominal discomfort that is managed now with Motrin.  Reports light to minimal lochia.  She is tolerating regular diet and spontaneously voiding.  Having BM but remains somewhat irregular for her.   She reports baby is doing well and she is bottlefeeding with formula-denies any breast complaints.    She was seen in the ED for abdominal pain and urinary symptoms-the symptoms seem to now have improved.  We reviewed urine culture results for low amounts of bacteria, Klebsiella pneumoniae.     Review of Systems    Per HPI    Objective:  /86 (BP Location: Left arm, Patient Position: Sitting, Cuff Size: Large)   Ht 5' 5\" (1.651 m)   Wt 104 kg (229 lb)   LMP 2023 (Approximate)   Breastfeeding No Comment: Bottlefeeding  BMI 38.11 kg/m²      Physical Exam  Vitals reviewed.   Constitutional:       General: She is not in acute distress.     Appearance: She is well-developed. She is not diaphoretic.   HENT:      Head: Normocephalic and atraumatic. "   Cardiovascular:      Rate and Rhythm: Normal rate and regular rhythm.      Heart sounds: Normal heart sounds. No murmur heard.     No friction rub. No gallop.   Pulmonary:      Effort: Pulmonary effort is normal. No respiratory distress.      Breath sounds: Normal breath sounds. No wheezing or rales.   Abdominal:      Palpations: Abdomen is soft.      Tenderness: There is no abdominal tenderness. There is no guarding or rebound.      Comments: Abdomen appropriately, mildly tender postoperatively- incision is clean/dry/intact with overlying glue.   Genitourinary:     Vagina: Normal.   Musculoskeletal:      Cervical back: Normal range of motion and neck supple.   Skin:     General: Skin is warm and dry.   Neurological:      Mental Status: She is alert and oriented to person, place, and time.   Psychiatric:         Mood and Affect: Mood and affect normal.         Behavior: Behavior normal.

## 2024-07-08 ENCOUNTER — APPOINTMENT (OUTPATIENT)
Dept: LAB | Facility: MEDICAL CENTER | Age: 29
End: 2024-07-08
Payer: COMMERCIAL

## 2024-07-08 DIAGNOSIS — R30.0 DYSURIA: ICD-10-CM

## 2024-07-08 LAB
BACTERIA UR QL AUTO: ABNORMAL /HPF
BILIRUB UR QL STRIP: NEGATIVE
CLARITY UR: CLEAR
COLOR UR: ABNORMAL
GLUCOSE UR STRIP-MCNC: NEGATIVE MG/DL
HGB UR QL STRIP.AUTO: ABNORMAL
KETONES UR STRIP-MCNC: NEGATIVE MG/DL
LEUKOCYTE ESTERASE UR QL STRIP: ABNORMAL
NITRITE UR QL STRIP: NEGATIVE
NON-SQ EPI CELLS URNS QL MICRO: ABNORMAL /HPF
PH UR STRIP.AUTO: 6.5 [PH]
PROT UR STRIP-MCNC: ABNORMAL MG/DL
RBC #/AREA URNS AUTO: ABNORMAL /HPF
SP GR UR STRIP.AUTO: 1.01 (ref 1–1.03)
UROBILINOGEN UR STRIP-ACNC: <2 MG/DL
WBC #/AREA URNS AUTO: ABNORMAL /HPF

## 2024-07-08 PROCEDURE — 87086 URINE CULTURE/COLONY COUNT: CPT | Performed by: STUDENT IN AN ORGANIZED HEALTH CARE EDUCATION/TRAINING PROGRAM

## 2024-07-08 PROCEDURE — 81001 URINALYSIS AUTO W/SCOPE: CPT

## 2024-07-09 LAB — BACTERIA UR CULT: NORMAL

## 2024-07-29 ENCOUNTER — POSTPARTUM VISIT (OUTPATIENT)
Dept: OBGYN CLINIC | Facility: CLINIC | Age: 29
End: 2024-07-29

## 2024-07-29 VITALS
DIASTOLIC BLOOD PRESSURE: 84 MMHG | HEIGHT: 65 IN | BODY MASS INDEX: 37.32 KG/M2 | WEIGHT: 224 LBS | SYSTOLIC BLOOD PRESSURE: 114 MMHG

## 2024-07-29 DIAGNOSIS — Z98.891 STATUS POST PRIMARY LOW TRANSVERSE CESAREAN SECTION: Primary | Chronic | ICD-10-CM

## 2024-07-29 DIAGNOSIS — Z30.011 ENCOUNTER FOR INITIAL PRESCRIPTION OF CONTRACEPTIVE PILLS: ICD-10-CM

## 2024-07-29 PROBLEM — O99.213 OBESITY AFFECTING PREGNANCY IN THIRD TRIMESTER: Status: RESOLVED | Noted: 2023-12-26 | Resolved: 2024-07-29

## 2024-07-29 PROBLEM — O47.9 UTERINE CONTRACTIONS: Status: RESOLVED | Noted: 2024-06-19 | Resolved: 2024-07-29

## 2024-07-29 PROBLEM — O43.123 VELAMENTOUS INSERTION OF UMBILICAL CORD IN THIRD TRIMESTER: Status: RESOLVED | Noted: 2024-02-20 | Resolved: 2024-07-29

## 2024-07-29 PROBLEM — Z3A.38 38 WEEKS GESTATION OF PREGNANCY: Status: RESOLVED | Noted: 2024-02-20 | Resolved: 2024-07-29

## 2024-07-29 PROBLEM — O41.00X0 OLIGOHYDRAMNIOS: Status: RESOLVED | Noted: 2024-06-25 | Resolved: 2024-07-29

## 2024-07-29 PROBLEM — O36.60X0 FETAL MACROSOMIA DURING PREGNANCY: Status: RESOLVED | Noted: 2024-06-25 | Resolved: 2024-07-29

## 2024-07-29 PROBLEM — O36.8130 DECREASED FETAL MOVEMENTS IN THIRD TRIMESTER: Status: RESOLVED | Noted: 2024-06-19 | Resolved: 2024-07-29

## 2024-07-29 PROCEDURE — 99024 POSTOP FOLLOW-UP VISIT: CPT | Performed by: STUDENT IN AN ORGANIZED HEALTH CARE EDUCATION/TRAINING PROGRAM

## 2024-07-29 RX ORDER — NORGESTIMATE AND ETHINYL ESTRADIOL 7DAYSX3 LO
1 KIT ORAL DAILY
Qty: 28 TABLET | Refills: 3 | Status: SHIPPED | OUTPATIENT
Start: 2024-07-29

## 2024-07-29 NOTE — PROGRESS NOTES
[facility]  Postpartum Visit  [place]  24      Assessment:  Elisabet is a 29 y.o.  4 weeks s/p Primary low transverse  section fpr concern for arrest of descent in the setting of concern for fetal macrosomia.  [place] score: 6    Plan:  1. Contraception: Patient prior to pregnancy was on tricyclic OCP- was diagnosed as CHTN in pregnancy, however on review there are no elevated BP prior to pregnancy and patient is normotensive today. We did review the risk of HTN on OCPs and I recommended a lower dose estrogen pill as was previously on 35 mcg and reviewed recommended follow up in 3 months for a blood pressure check. She understands that elevated BP on OCP would prompt discontinuation and recommend progestin only pill as patient prefers to stay on this formulation as she has done well with it in the past and not so much with other forms of OCPs. Tri-lo-estrylla sent to pharmacy until annual exam.   2. Annual exam due in October; Last Pap: 10/2021- NILM, Due on 10/2024  3. Lactation consult, [facility] information discussed.   4. Increase activity as tolerated, may resume all normal activity.  5. Anticipated return to work: 6 - 8 weeks post partum.    Subjective     Elisabet Oropeza is a 29 y.o. female who presents for a postpartum visit. She is 4 weeks postpartum following a primary  section, low transverse incision. I have fully reviewed the prenatal and intrapartum course. The delivery was at 38 gestational weeks. She delivered a female/ male , weight 7lb 6.7oz with APGARS of 9 and 9 at 1 and 5 minutes respectively.     Lochia is no bleeding.She does report continued constipation at times. Bladder function is normal. Patient has not  been sexually active. Desired contraception method is OCP (estrogen/progesterone).     Postpartum depression screening: negative.    Baby's course has been uncomplicated.   Baby is feeding by bottle/ formula.    Last Pap : 10/2021-  "NILM   Gestational Diabetes: no  Pregnancy Complications: HTN, suspected fetal macrosomia    The following portions of the patient's history were reviewed and updated as appropriate: allergies, current medications, past family history, past medical history, past social history, past surgical history, and problem list.      Current Outpatient Medications:     busPIRone (BUSPAR) 5 mg tablet, 2 TABLETS TWICE DAILY, Disp: 90 tablet, Rfl: 5    polyethylene glycol (MIRALAX) 17 g packet, Take 17 g by mouth daily, Disp: 14 each, Rfl: 0    benzocaine-menthol-lanolin-aloe (DERMOPLAST) 20-0.5 % topical spray, Apply 1 Application topically 4 (four) times a day as needed for mild pain or irritation (Patient not taking: Reported on 7/5/2024), Disp: , Rfl:     famotidine (PEPCID) 20 mg tablet, Take 1 tablet (20 mg total) by mouth 2 (two) times a day as needed for heartburn (Patient not taking: Reported on 7/5/2024), Disp: , Rfl:     hydrocortisone 1 % cream, Apply 1 Application topically daily as needed for irritation or rash (Patient not taking: Reported on 7/5/2024), Disp: , Rfl:     ibuprofen (MOTRIN) 600 mg tablet, Take 1 tablet (600 mg total) by mouth every 6 (six) hours, Disp: 30 tablet, Rfl: 0    senna (SENOKOT) 8.6 mg, Take 1 tablet (8.6 mg total) by mouth 2 (two) times a day as needed for constipation for up to 20 days (Patient not taking: Reported on 7/29/2024), Disp: 20 tablet, Rfl: 0    Allergies   Allergen Reactions    Augmentin [Amoxicillin-Pot Clavulanate] Hives     Pt. Breaks up in hives       Review of Systems  Constitutional: no fever, feels well  Breasts: no complaints of breast pain, breast lump, or nipple discharge  Gastrointestinal: no complaints nausea, vomiting  Genitourinary: as noted in HPI.  Neurological: no complaints of headache    Objective      /84 (BP Location: Left arm, Patient Position: Sitting, Cuff Size: Large)   Ht 5' 5\" (1.651 m)   Wt 102 kg (224 lb)   LMP 08/31/2023 (Approximate)   " Breastfeeding No Comment: Bottlefeeding (formula)  BMI 37.28 kg/m²   Physical Exam  Vitals reviewed.   Constitutional:       General: She is not in acute distress.     Appearance: Normal appearance. She is not ill-appearing or diaphoretic.   Cardiovascular:      Rate and Rhythm: Normal rate.   Pulmonary:      Effort: Pulmonary effort is normal. No respiratory distress.   Abdominal:      Palpations: Abdomen is soft.      Tenderness: There is no abdominal tenderness. There is no guarding or rebound.      Comments: Incision is clean and dry- mid incision there is a 5mm area with mild fluctuance, no drainage and no overlying warmth, there is minimal erythema.   Musculoskeletal:      Right lower leg: No edema.      Left lower leg: No edema.   Skin:     General: Skin is warm and dry.   Neurological:      Mental Status: She is alert and oriented to person, place, and time.   Psychiatric:         Mood and Affect: Mood normal.         Behavior: Behavior normal.

## 2024-08-26 ENCOUNTER — VBI (OUTPATIENT)
Dept: ADMINISTRATIVE | Facility: OTHER | Age: 29
End: 2024-08-26

## 2024-08-26 NOTE — TELEPHONE ENCOUNTER
08/26/24 10:41 AM     Chart reviewed for Pap Smear (HPV) aka Cervical Cancer Screening ; nothing is submitted to the patient's insurance at this time.     GARO CIFUENTES MA   [facility]

## 2024-08-27 ENCOUNTER — PATIENT MESSAGE (OUTPATIENT)
Dept: FAMILY MEDICINE CLINIC | Facility: CLINIC | Age: 29
End: 2024-08-27

## 2024-09-04 ENCOUNTER — OFFICE VISIT (OUTPATIENT)
Dept: FAMILY MEDICINE CLINIC | Facility: CLINIC | Age: 29
End: 2024-09-04
Payer: COMMERCIAL

## 2024-09-04 VITALS
SYSTOLIC BLOOD PRESSURE: 134 MMHG | HEART RATE: 96 BPM | DIASTOLIC BLOOD PRESSURE: 90 MMHG | HEIGHT: 65 IN | OXYGEN SATURATION: 98 % | BODY MASS INDEX: 37.25 KG/M2 | WEIGHT: 223.6 LBS | TEMPERATURE: 97.4 F

## 2024-09-04 DIAGNOSIS — F41.1 GAD (GENERALIZED ANXIETY DISORDER): ICD-10-CM

## 2024-09-04 PROCEDURE — 99214 OFFICE O/P EST MOD 30 MIN: CPT | Performed by: FAMILY MEDICINE

## 2024-09-04 RX ORDER — BUSPIRONE HYDROCHLORIDE 15 MG/1
15 TABLET ORAL 2 TIMES DAILY
Qty: 60 TABLET | Refills: 3 | Status: SHIPPED | OUTPATIENT
Start: 2024-09-04

## 2024-09-04 NOTE — PROGRESS NOTES
Ambulatory Visit  Name: Elisabet Oropeza      : 1995      MRN: [id number]  Encounter Provider: Lamberto Bhatt MD  Encounter Date: 2024   Encounter department: [facility]    Assessment & Plan   1. TONEY (generalized anxiety disorder)  Assessment & Plan:  I reviewed with pt and .  TONEY-7 = 9 suggesting persistent mild-mod depression. REC: increase Buspar to 15mg bid. Recheck 2m - earlier if worse.   Orders:  -     busPIRone (BUSPAR) 15 mg tablet; Take 1 tablet (15 mg total) by mouth 2 (two) times a day 2 TABLETS TWICE DAILY  -     TSH, 3rd generation with Free T4 reflex; Future  -     TSH, 3rd generation with Free T4 reflex       History of Present Illness     Pt her for f/u  - pt states that anxiety has improved with Buspar, 10mg bid, but pt still struggles. Sleep labile due to  but pt finds that anxiety persists even when she sleeps well. No worsening depression.    - pt denies any other new concerns      Review of Systems   Constitutional:  Positive for fatigue. Negative for activity change and appetite change.   HENT: Negative.     Eyes: Negative.    Respiratory: Negative.     Cardiovascular: Negative.    Gastrointestinal: Negative.    Genitourinary: Negative.    Musculoskeletal: Negative.    Skin: Negative.    Neurological: Negative.    Psychiatric/Behavioral:  Positive for sleep disturbance. Negative for dysphoric mood, self-injury and suicidal ideas. The patient is nervous/anxious.      Past Medical History:   Diagnosis Date   • Anxiety    • Decreased fetal movements in third trimester 2024   • Fetal macrosomia during pregnancy 2024   • GERD (gastroesophageal reflux disease)    • Headache(784.0)     Stress headaches   • Obesity affecting pregnancy in third trimester 2023    Continue ASA until 36 weeks      • Ovarian cyst     last assessed 2014   • Varicella     had vaccine   • Velamentous insertion of umbilical cord in third  trimester 2024    Planning weekly NST w/ FRANCI at 36 weeks       Past Surgical History:   Procedure Laterality Date   •  SECTION  2024   • MA  DELIVERY ONLY N/A 2024    Procedure:  SECTION ();  Surgeon: Suha Irwin MD;  Location: [facility] ;  Service: Obstetrics   • WISDOM TOOTH EXTRACTION  2018     Family History   Problem Relation Age of Onset   • Gallbladder disease Mother    • Migraines Mother    • Hypertension Father    • Asthma Father    • Endometriosis Sister         Partial Hyst   • Ulcerative colitis Maternal Grandmother    • Other Maternal Grandfather         cardiac disorder     Social History     Tobacco Use   • Smoking status: Never     Passive exposure: Never   • Smokeless tobacco: Never   Vaping Use   • Vaping status: Never Used   Substance and Sexual Activity   • Alcohol use: Yes     Alcohol/week: 2.0 standard drinks of alcohol     Types: 2 Glasses of wine per week     Comment: I drink socially   • Drug use: Never   • Sexual activity: Not Currently     Partners: Male     Comment: recent pregnancy     Current Outpatient Medications on File Prior to Visit   Medication Sig   • norgestimate-ethinyl estradiol (Tri-Lo-Estarylla) 0.18/0.215/0.25 MG-25 MCG per tablet Take 1 tablet by mouth daily     Allergies   Allergen Reactions   • Augmentin [Amoxicillin-Pot Clavulanate] Hives     Pt. Breaks up in hives     Immunization History   Administered Date(s) Administered   • COVID-19 PFIZER VACCINE 0.3 ML IM 2021, 2021, 2021   • DTP 1996, 1996, 1996, 1997   • DTaP 5 2001   • HPV Quadrivalent 2010, 03/10/2010, 2010   • Hep B, adult 1996, 1996, 1997   • Hib (PRP-OMP) 1996, 1996, 1996, 1997   • IPV 1996, 1996, 1996, 2001   • Influenza, injectable, quadrivalent, preservative free 0.5 mL 2023   • MMR 1997, 2001   • Meningococcal,  "Unknown Serogroups 05/29/2008, 06/11/2014   • Tdap 05/29/2008, 06/11/2014, 04/08/2024   • Tuberculin Skin Test-PPD Intradermal 05/06/1997   • Varicella 02/09/1998, 05/28/2008     Objective     /90   Pulse 96   Temp (!) 97.4 °F (36.3 °C)   Ht 5' 5\" (1.651 m)   Wt 101 kg (223 lb 9.6 oz)   LMP 08/16/2024 (Exact Date)   SpO2 98%   Breastfeeding No   BMI 37.21 kg/m²     Physical Exam  Vitals reviewed.   HENT:      Head: Normocephalic.      Mouth/Throat:      Mouth: Mucous membranes are moist.   Eyes:      Extraocular Movements: Extraocular movements intact.      Conjunctiva/sclera: Conjunctivae normal.      Pupils: Pupils are equal, round, and reactive to light.   Cardiovascular:      Rate and Rhythm: Normal rate and regular rhythm.      Pulses: Normal pulses.   Pulmonary:      Effort: Pulmonary effort is normal.   Musculoskeletal:         General: No swelling or tenderness.      Cervical back: No tenderness.      Right lower leg: No edema.      Left lower leg: No edema.   Lymphadenopathy:      Cervical: No cervical adenopathy.   Neurological:      General: No focal deficit present.      Mental Status: She is alert and oriented to person, place, and time.   Psychiatric:         Behavior: Behavior normal.         Thought Content: Thought content normal.         Judgment: Judgment normal.      Comments: PHQ-2/9 Depression Screening    Little interest or pleasure in doing things: 0 - not at all  Feeling down, depressed, or hopeless: 0 - not at all  PHQ-2 Score: 0  PHQ-2 Interpretation: Negative depression screen     TONEY-7 Flowsheet Screening    Flowsheet Row Most Recent Value   Over the last two weeks, how often have you been bothered by the following   problems?     Feeling nervous, anxious, or on edge 2   Not being able to stop or control worrying 1   Worrying too much about different things 1   Trouble relaxing  2   Being so restless that it's hard to sit still 0   Becoming easily annoyed or irritable  2 "   Feeling afraid as if something awful might happen 1   How difficult have these problems made it for you to do your work, take   care of things at home, or get along with other people?  Not difficult at   all   TONEY Score  9

## 2024-09-05 PROBLEM — I10 CHRONIC HYPERTENSION: Status: RESOLVED | Noted: 2024-02-20 | Resolved: 2024-09-05

## 2024-09-05 NOTE — ASSESSMENT & PLAN NOTE
I reviewed with pt and .  TONEY-7 = 9 suggesting persistent mild-mod depression. REC: increase Buspar to 15mg bid. Recheck 2m - earlier if worse.

## 2024-09-22 LAB — TSH SERPL-ACNC: 1.81 MIU/L

## 2024-09-30 DIAGNOSIS — F41.1 GAD (GENERALIZED ANXIETY DISORDER): ICD-10-CM

## 2024-10-01 DIAGNOSIS — F41.1 GAD (GENERALIZED ANXIETY DISORDER): ICD-10-CM

## 2024-10-01 RX ORDER — BUSPIRONE HYDROCHLORIDE 15 MG/1
15 TABLET ORAL 2 TIMES DAILY
Qty: 180 TABLET | Refills: 2 | Status: SHIPPED | OUTPATIENT
Start: 2024-10-01 | End: 2024-10-02

## 2024-10-02 RX ORDER — BUSPIRONE HYDROCHLORIDE 15 MG/1
30 TABLET ORAL 2 TIMES DAILY
Qty: 180 TABLET | Refills: 2 | Status: SHIPPED | OUTPATIENT
Start: 2024-10-02

## 2024-10-02 NOTE — TELEPHONE ENCOUNTER
"Please review to see if the refill is appropriate.     Pharmacy comment\" Script clarification: Directions??     "

## 2024-10-28 NOTE — PROGRESS NOTES
Assessment & Plan   Problem List Items Addressed This Visit       Status post primary low transverse  section (Chronic)     Other Visit Diagnoses       Well woman exam    -  Primary    Elevated BP without diagnosis of hypertension                Discussion    All questions have been answered to her satisfaction  RTO for APE or sooner if needed  Pap done.   BP on recheck 148/90. Pt does endorse recent caffeine intake. She will plan to take Bps at home and send me a log. If elevated regularly she is agreeable to discuss alternative BC options.         Subjective     HPI   Elisabet Oropeza is a 29 y.o. female who presents for annual well woman exam.     LMP - 10/08/24 ; Periods are reg q 28 days and last 4-5 days; No excessive bleeding; No intermenstrual bleeding or spotting; Cramps are tolerable.    No vulvar itch/burn; No vaginal itch/burn; No abn discharge or odor; No urinary sx - burning/pain/frequency/hematuria    No concerning breast masses, asymmetry, nipple discharge or bleeding, changes in skin of breast, or breast tenderness bilaterally    She does note some cramping discomfort at her incision site in certain positions.     Pt is sexually active in a mutually monog/ sexual relationship; No issues with intercourse; She declines sti/hiv/hep testing; Feels safe at home  Current contraception: OCPs    (+) PCP for routine Bw/care;    Last Pap : 10/21/21   History of abnormal Pap smear: denies       Review of Systems   Constitutional: Negative.    Respiratory: Negative.     Gastrointestinal: Negative.    Endocrine: Negative.    Genitourinary: Negative.        The following portions of the patient's history were reviewed and updated as appropriate: allergies, current medications, past family history, past medical history, past social history, past surgical history, and problem list.         OB History          1    Para   1    Term   1       0    AB   0    Living   1         SAB   0     IAB   0    Ectopic   0    Multiple   0    Live Births   1           Obstetric Comments    SMA neg; Hgb electrophoresis WNL               Past Medical History:   Diagnosis Date    Anxiety     Decreased fetal movements in third trimester 2024    Fetal macrosomia during pregnancy 2024    GERD (gastroesophageal reflux disease)     Headache(784.0)     Stress headaches    Obesity affecting pregnancy in third trimester 2023    Continue ASA until 36 weeks       Ovarian cyst     last assessed 2014    Varicella     had vaccine    Velamentous insertion of umbilical cord in third trimester 2024    Planning weekly NST w/ FRANCI at 36 weeks         Past Surgical History:   Procedure Laterality Date     SECTION  2024    MI  DELIVERY ONLY N/A 2024    Procedure:  SECTION ();  Surgeon: Suha Irwin MD;  Location: [facility] ;  Service: Obstetrics    WISDOM TOOTH EXTRACTION  2018       Family History   Problem Relation Age of Onset    Gallbladder disease Mother     Migraines Mother     Hypertension Father     Asthma Father     Endometriosis Sister         Partial Hyst    Ulcerative colitis Maternal Grandmother     Other Maternal Grandfather         cardiac disorder       Social History     Socioeconomic History    Marital status: /Civil Union     Spouse name: Not on file    Number of children: Not on file    Years of education: Not on file    Highest education level: Not on file   Occupational History     Employer: [employer]   Tobacco Use    Smoking status: Never     Passive exposure: Never    Smokeless tobacco: Never   Vaping Use    Vaping status: Never Used   Substance and Sexual Activity    Alcohol use: Yes     Alcohol/week: 2.0 standard drinks of alcohol     Types: 2 Glasses of wine per week     Comment: I drink socially    Drug use: Never    Sexual activity: Yes     Partners: Male     Birth control/protection: OCP   Other Topics  "Concern    Not on file   Social History Narrative    Daily caffeine consumption, 1 serving per day    Exercises regularly     Social Determinants of Health     Financial Resource Strain: Not on file   Food Insecurity: No Food Insecurity (6/26/2024)    Nursing - Inadequate Food Risk Classification     Worried About Running Out of Food in the Last Year: Never true     Ran Out of Food in the Last Year: Never true     Ran Out of Food in the Last Year: Not on file   Transportation Needs: No Transportation Needs (6/26/2024)    PRAPARE - Transportation     Lack of Transportation (Medical): No     Lack of Transportation (Non-Medical): No   Physical Activity: Not on file   Stress: Not on file   Social Connections: Not on file   Intimate Partner Violence: Not on file   Housing Stability: Low Risk  (6/26/2024)    Housing Stability Vital Sign     Unable to Pay for Housing in the Last Year: No     Number of Times Moved in the Last Year: 0     Homeless in the Last Year: No         Current Outpatient Medications:     busPIRone (BUSPAR) 15 mg tablet, Take 2 tablets (30 mg total) by mouth 2 (two) times a day, Disp: 180 tablet, Rfl: 2    norgestimate-ethinyl estradiol (Tri-Lo-Estarylla) 0.18/0.215/0.25 MG-25 MCG per tablet, Take 1 tablet by mouth daily, Disp: 28 tablet, Rfl: 3    Allergies   Allergen Reactions    Augmentin [Amoxicillin-Pot Clavulanate] Hives     Pt. Breaks up in hives       Objective   Vitals:    10/29/24 1029   BP: 132/92   BP Location: Left arm   Patient Position: Sitting   Cuff Size: Standard   Weight: 103 kg (228 lb)   Height: 5' 5\" (1.651 m)     Physical Exam  Vitals reviewed.   HENT:      Head: Normocephalic and atraumatic.   Cardiovascular:      Rate and Rhythm: Normal rate and regular rhythm.   Pulmonary:      Effort: Pulmonary effort is normal.      Breath sounds: Normal breath sounds.   Chest:   Breasts:     Breasts are symmetrical.      Right: No swelling, bleeding, inverted nipple, mass, nipple discharge, " skin change or tenderness.      Left: No swelling, bleeding, inverted nipple, mass, nipple discharge, skin change or tenderness.   Abdominal:      General: Abdomen is flat. Bowel sounds are normal.      Palpations: Abdomen is soft.      Tenderness: There is no abdominal tenderness. There is no right CVA tenderness, left CVA tenderness or guarding.   Genitourinary:     General: Normal vulva.      Pubic Area: No rash.       Labia:         Right: No rash, tenderness, lesion or injury.         Left: No rash, tenderness, lesion or injury.       Urethra: No prolapse, urethral pain, urethral swelling or urethral lesion.      Vagina: Normal. No signs of injury and foreign body. No vaginal discharge or erythema.      Cervix: Normal.      Uterus: Normal.       Adnexa: Right adnexa normal and left adnexa normal.   Musculoskeletal:      Cervical back: Neck supple.   Lymphadenopathy:      Upper Body:      Right upper body: No axillary adenopathy.      Left upper body: No axillary adenopathy.   Skin:     General: Skin is warm and dry.   Neurological:      Mental Status: She is alert and oriented to person, place, and time.   Psychiatric:         Mood and Affect: Mood normal.         Behavior: Behavior normal.         Thought Content: Thought content normal.         Judgment: Judgment normal.         There are no Patient Instructions on file for this visit.

## 2024-10-29 ENCOUNTER — ANNUAL EXAM (OUTPATIENT)
Dept: OBGYN CLINIC | Facility: CLINIC | Age: 29
End: 2024-10-29
Payer: COMMERCIAL

## 2024-10-29 VITALS
DIASTOLIC BLOOD PRESSURE: 92 MMHG | BODY MASS INDEX: 37.99 KG/M2 | SYSTOLIC BLOOD PRESSURE: 132 MMHG | HEIGHT: 65 IN | WEIGHT: 228 LBS

## 2024-10-29 DIAGNOSIS — R03.0 ELEVATED BP WITHOUT DIAGNOSIS OF HYPERTENSION: ICD-10-CM

## 2024-10-29 DIAGNOSIS — Z98.891 STATUS POST PRIMARY LOW TRANSVERSE CESAREAN SECTION: Chronic | ICD-10-CM

## 2024-10-29 DIAGNOSIS — Z01.419 WELL WOMAN EXAM: Primary | ICD-10-CM

## 2024-10-29 PROCEDURE — S0612 ANNUAL GYNECOLOGICAL EXAMINA: HCPCS | Performed by: PHYSICIAN ASSISTANT

## 2024-10-29 PROCEDURE — G0145 SCR C/V CYTO,THINLAYER,RESCR: HCPCS | Performed by: PHYSICIAN ASSISTANT

## 2024-11-01 LAB
LAB AP GYN PRIMARY INTERPRETATION: NORMAL
Lab: NORMAL

## 2024-11-04 ENCOUNTER — IMMUNIZATIONS (OUTPATIENT)
Dept: FAMILY MEDICINE CLINIC | Facility: CLINIC | Age: 29
End: 2024-11-04
Payer: COMMERCIAL

## 2024-11-04 DIAGNOSIS — Z23 ENCOUNTER FOR IMMUNIZATION: Primary | ICD-10-CM

## 2024-11-04 PROCEDURE — 90656 IIV3 VACC NO PRSV 0.5 ML IM: CPT

## 2024-11-04 PROCEDURE — G0008 ADMIN INFLUENZA VIRUS VAC: HCPCS

## 2024-11-06 DIAGNOSIS — F41.1 GAD (GENERALIZED ANXIETY DISORDER): ICD-10-CM

## 2024-11-07 RX ORDER — BUSPIRONE HYDROCHLORIDE 15 MG/1
30 TABLET ORAL 2 TIMES DAILY
Qty: 360 TABLET | Refills: 1 | Status: SHIPPED | OUTPATIENT
Start: 2024-11-07

## 2024-11-11 NOTE — TELEPHONE ENCOUNTER
Fran Kent MD     Refill Decision Note   Janine Farley  Messaged on-call provider regarding patient symptoms. Provider recommends to go to L&D. Patient agreeable. [facility] L&D Charge RN made aware of patient arrival.

## 2024-11-27 ENCOUNTER — OFFICE VISIT (OUTPATIENT)
Dept: FAMILY MEDICINE CLINIC | Facility: CLINIC | Age: 29
End: 2024-11-27
Payer: COMMERCIAL

## 2024-11-27 VITALS
WEIGHT: 224 LBS | DIASTOLIC BLOOD PRESSURE: 78 MMHG | SYSTOLIC BLOOD PRESSURE: 118 MMHG | HEART RATE: 101 BPM | HEIGHT: 65 IN | OXYGEN SATURATION: 99 % | BODY MASS INDEX: 37.32 KG/M2 | TEMPERATURE: 97.6 F

## 2024-11-27 DIAGNOSIS — L29.9 GENERALIZED PRURITUS: ICD-10-CM

## 2024-11-27 DIAGNOSIS — F41.1 GAD (GENERALIZED ANXIETY DISORDER): Primary | ICD-10-CM

## 2024-11-27 PROCEDURE — 99214 OFFICE O/P EST MOD 30 MIN: CPT | Performed by: FAMILY MEDICINE

## 2024-11-27 RX ORDER — BUSPIRONE HYDROCHLORIDE 10 MG/1
10 TABLET ORAL EVERY MORNING
Qty: 30 TABLET | Refills: 5 | Status: SHIPPED | OUTPATIENT
Start: 2024-11-27

## 2024-11-27 RX ORDER — BUSPIRONE HYDROCHLORIDE 15 MG/1
15 TABLET ORAL EVERY EVENING
Qty: 30 TABLET | Refills: 5 | Status: SHIPPED | OUTPATIENT
Start: 2024-11-27

## 2024-11-27 NOTE — ASSESSMENT & PLAN NOTE
I reviewed with pt. Doing very well on present regimen (TONEY-7 now = 2).  Continue present care.  Recheck 3m  Orders:    busPIRone (BUSPAR) 15 mg tablet; Take 1 tablet (15 mg total) by mouth every evening    busPIRone (BUSPAR) 10 mg tablet; Take 1 tablet (10 mg total) by mouth every morning

## 2024-11-27 NOTE — PROGRESS NOTES
"Name: Elisabet Oropeza      : 1995      MRN: [id number]  Encounter Provider: Lamberto Bhatt MD  Encounter Date: 2024   Encounter department: [facility]    Assessment & Plan  TONEY (generalized anxiety disorder)  I reviewed with pt. Doing very well on present regimen (TONEY-7 now = 2).  Continue present care.  Recheck 3m  Orders:    busPIRone (BUSPAR) 15 mg tablet; Take 1 tablet (15 mg total) by mouth every evening    busPIRone (BUSPAR) 10 mg tablet; Take 1 tablet (10 mg total) by mouth every morning    Generalized pruritus  ?cause exam unremarkable.  Check labs (nonfasting). Pt presently changing detergents etc back to prevous brands. Recheck 1m if not improving  Orders:    CBC and differential; Future    Comprehensive metabolic panel; Future         History of Present Illness     f/u multiple med issues  -Patient states that taking BuSpar 15 mg twice daily caused her to feel \"zombie like\".  She had some leftover tens and change the dosing to 10 in the morning and 15 in the evening which has been very effective.  Patient would like to continue on that dose.  TONEY done.  -Patient also notes the last 3 months frequent episodes of generalized pruritus.  Patient states that he will start as a \"pinch\" in place on her body followed by diffuse pruritus that is not associated with any skin changes including rashes, welts etc.  Tends to occur mostly in the evening.  May have started when we increased the dose of the BuSpar, though patient also changed her birth control pill.  Patient has changed similar products in her house since the birth of her daughter-she is not sure if this is played a role.        Review of Systems   Constitutional: Negative.    HENT: Negative.     Eyes: Negative.    Respiratory: Negative.     Cardiovascular: Negative.    Gastrointestinal: Negative.    Genitourinary: Negative.    Musculoskeletal: Negative.    Skin: Negative.         Pruritus without skin " changes   Neurological: Negative.    Psychiatric/Behavioral:  Negative for dysphoric mood, sleep disturbance and suicidal ideas. The patient is nervous/anxious.      Past Medical History:   Diagnosis Date    Anxiety     Decreased fetal movements in third trimester 2024    Fetal macrosomia during pregnancy 2024    GERD (gastroesophageal reflux disease)     Headache(784.0)     Stress headaches    Obesity affecting pregnancy in third trimester 2023    Continue ASA until 36 weeks       Ovarian cyst     last assessed 2014    Varicella     had vaccine    Velamentous insertion of umbilical cord in third trimester 2024    Planning weekly NST w/ FRANCI at 36 weeks       Past Surgical History:   Procedure Laterality Date     SECTION  2024    [place]  DELIVERY ONLY N/A 2024    Procedure:  SECTION ();  Surgeon: Suha Irwin MD;  Location: [facility] ;  Service: Obstetrics    WISDOM TOOTH EXTRACTION  2018     Family History   Problem Relation Age of Onset    Gallbladder disease Mother     Migraines Mother     Hypertension Father     Asthma Father     Endometriosis Sister         Partial Hyst    Ulcerative colitis Maternal Grandmother     Other Maternal Grandfather         cardiac disorder     Social History     Tobacco Use    Smoking status: Never     Passive exposure: Never    Smokeless tobacco: Never   Vaping Use    Vaping status: Never Used   Substance and Sexual Activity    Alcohol use: Yes     Alcohol/week: 2.0 standard drinks of alcohol     Types: 2 Glasses of wine per week     Comment: I drink socially    Drug use: Never    Sexual activity: Yes     Partners: Male     Birth control/protection: OCP     Current Outpatient Medications on File Prior to Visit   Medication Sig    norgestimate-ethinyl estradiol (Tri-Lo-Estarylla) 0.18/0.215/0.25 MG-25 MCG per tablet Take 1 tablet by mouth daily    [DISCONTINUED] busPIRone (BUSPAR) 15 mg tablet TAKE 2 TABLETS BY MOUTH  "2 TIMES A DAY.     Allergies   Allergen Reactions    Augmentin [Amoxicillin-Pot Clavulanate] Hives     Pt. Breaks up in hives     Immunization History   Administered Date(s) Administered    COVID-19 PFIZER VACCINE 0.3 ML IM 03/27/2021, 04/17/2021, 11/20/2021    DTP 1996, 1996, 1996, 12/11/1997    DTaP 5 05/30/2001    HPV Quadrivalent 01/07/2010, 03/10/2010, 07/13/2010    Hep B, adult 1996, 1996, 03/11/1997    Hib (PRP-OMP) 1996, 1996, 1996, 12/11/1997    IPV 1996, 1996, 1996, 05/30/2001    Influenza, injectable, quadrivalent, preservative free 0.5 mL 12/18/2023    Influenza, seasonal, injectable, preservative free 11/04/2024    MMR 08/25/1997, 05/30/2001    Meningococcal, Unknown Serogroups 05/29/2008, 06/11/2014    Tdap 05/29/2008, 06/11/2014, 04/08/2024    Tuberculin Skin Test-PPD Intradermal 05/06/1997    Varicella 02/09/1998, 05/28/2008     Objective   /78   Pulse 101   Temp 97.6 °F (36.4 °C)   Ht 5' 5\" (1.651 m)   Wt 102 kg (224 lb)   LMP 10/08/2024 (Within Days)   SpO2 99%   BMI 37.28 kg/m²     Physical Exam  Vitals reviewed.   HENT:      Head: Normocephalic.      Mouth/Throat:      Mouth: Mucous membranes are moist.   Eyes:      Extraocular Movements: Extraocular movements intact.      Conjunctiva/sclera: Conjunctivae normal.      Pupils: Pupils are equal, round, and reactive to light.   Cardiovascular:      Rate and Rhythm: Normal rate and regular rhythm.      Pulses: Normal pulses.   Pulmonary:      Effort: Pulmonary effort is normal.   Abdominal:      General: Abdomen is flat. There is no distension.      Palpations: There is no mass.      Tenderness: There is no abdominal tenderness.   Musculoskeletal:      Cervical back: No tenderness.      Right lower leg: No edema.      Left lower leg: No edema.   Lymphadenopathy:      Cervical: No cervical adenopathy.   Skin:     General: Skin is warm.      Coloration: Skin is not " jaundiced.      Findings: No erythema, lesion or rash.   Neurological:      General: No focal deficit present.      Mental Status: She is alert and oriented to person, place, and time.   Psychiatric:         Mood and Affect: Mood normal.      Comments: PHQ-2/9 Depression Screening    Little interest or pleasure in doing things: 0 - not at all  Feeling down, depressed, or hopeless: 0 - not at all  PHQ-2 Score: 0  PHQ-2 Interpretation: Negative depression screen     TONEY-7 Flowsheet Screening    Flowsheet Row Most Recent Value   Over the last two weeks, how often have you been bothered by the following   problems?     Feeling nervous, anxious, or on edge 1   Not being able to stop or control worrying 0   Worrying too much about different things 0   Trouble relaxing  0   Being so restless that it's hard to sit still 0   Becoming easily annoyed or irritable  1   Feeling afraid as if something awful might happen 0   How difficult have these problems made it for you to do your work, take   care of things at home, or get along with other people?  Not difficult at   all   TONEY Score  2

## 2024-12-08 LAB
ALBUMIN SERPL-MCNC: 4.2 G/DL (ref 3.6–5.1)
ALBUMIN/GLOB SERPL: 1.4 (CALC) (ref 1–2.5)
ALP SERPL-CCNC: 62 U/L (ref 31–125)
ALT SERPL-CCNC: 11 U/L (ref 6–29)
AST SERPL-CCNC: 12 U/L (ref 10–30)
BASOPHILS # BLD AUTO: 40 CELLS/UL (ref 0–200)
BASOPHILS NFR BLD AUTO: 0.5 %
BILIRUB SERPL-MCNC: 0.4 MG/DL (ref 0.2–1.2)
BUN SERPL-MCNC: 12 MG/DL (ref 7–25)
BUN/CREAT SERPL: NORMAL (CALC) (ref 6–22)
CALCIUM SERPL-MCNC: 9.3 MG/DL (ref 8.6–10.2)
CHLORIDE SERPL-SCNC: 106 MMOL/L (ref 98–110)
CO2 SERPL-SCNC: 24 MMOL/L (ref 20–32)
CREAT SERPL-MCNC: 0.8 MG/DL (ref 0.5–0.96)
EOSINOPHIL # BLD AUTO: 72 CELLS/UL (ref 15–500)
EOSINOPHIL NFR BLD AUTO: 0.9 %
ERYTHROCYTE [DISTWIDTH] IN BLOOD BY AUTOMATED COUNT: 13.4 % (ref 11–15)
GFR/BSA.PRED SERPLBLD CYS-BASED-ARV: 103 ML/MIN/1.73M2
GLOBULIN SER CALC-MCNC: 3.1 G/DL (CALC) (ref 1.9–3.7)
GLUCOSE SERPL-MCNC: 86 MG/DL (ref 65–99)
HCT VFR BLD AUTO: 42.7 % (ref 35–45)
HGB BLD-MCNC: 14.4 G/DL (ref 11.7–15.5)
LYMPHOCYTES # BLD AUTO: 2688 CELLS/UL (ref 850–3900)
LYMPHOCYTES NFR BLD AUTO: 33.6 %
MCH RBC QN AUTO: 31 PG (ref 27–33)
MCHC RBC AUTO-ENTMCNC: 33.7 G/DL (ref 32–36)
MCV RBC AUTO: 91.8 FL (ref 80–100)
MONOCYTES # BLD AUTO: 480 CELLS/UL (ref 200–950)
MONOCYTES NFR BLD AUTO: 6 %
NEUTROPHILS # BLD AUTO: 4720 CELLS/UL (ref 1500–7800)
NEUTROPHILS NFR BLD AUTO: 59 %
PLATELET # BLD AUTO: 275 THOUSAND/UL (ref 140–400)
PMV BLD REES-ECKER: 11.9 FL (ref 7.5–12.5)
POTASSIUM SERPL-SCNC: 4.5 MMOL/L (ref 3.5–5.3)
PROT SERPL-MCNC: 7.3 G/DL (ref 6.1–8.1)
RBC # BLD AUTO: 4.65 MILLION/UL (ref 3.8–5.1)
SODIUM SERPL-SCNC: 140 MMOL/L (ref 135–146)
WBC # BLD AUTO: 8 THOUSAND/UL (ref 3.8–10.8)

## 2024-12-12 DIAGNOSIS — F41.1 GAD (GENERALIZED ANXIETY DISORDER): ICD-10-CM

## 2024-12-12 RX ORDER — BUSPIRONE HYDROCHLORIDE 10 MG/1
10 TABLET ORAL EVERY MORNING
Qty: 90 TABLET | Refills: 0 | Status: SHIPPED | OUTPATIENT
Start: 2024-12-12

## 2025-01-07 ENCOUNTER — VBI (OUTPATIENT)
Dept: ADMINISTRATIVE | Facility: OTHER | Age: 30
End: 2025-01-07

## 2025-01-07 NOTE — TELEPHONE ENCOUNTER
01/07/25 4:40 PM     Chart reviewed for Pap Smear (HPV) aka Cervical Cancer Screening ; nothing is submitted to the patient's insurance at this time.     Denice Ahuja MA   [facility]

## 2025-02-26 ENCOUNTER — RA CDI HCC (OUTPATIENT)
Dept: OTHER | Facility: HOSPITAL | Age: 30
End: 2025-02-26

## 2025-02-27 ENCOUNTER — OFFICE VISIT (OUTPATIENT)
Dept: FAMILY MEDICINE CLINIC | Facility: CLINIC | Age: 30
End: 2025-02-27
Payer: COMMERCIAL

## 2025-02-27 VITALS
HEART RATE: 84 BPM | HEIGHT: 65 IN | WEIGHT: 221.3 LBS | OXYGEN SATURATION: 99 % | BODY MASS INDEX: 36.87 KG/M2 | TEMPERATURE: 97.1 F | DIASTOLIC BLOOD PRESSURE: 78 MMHG | SYSTOLIC BLOOD PRESSURE: 120 MMHG

## 2025-02-27 DIAGNOSIS — F41.1 GAD (GENERALIZED ANXIETY DISORDER): Primary | ICD-10-CM

## 2025-02-27 PROCEDURE — 99213 OFFICE O/P EST LOW 20 MIN: CPT | Performed by: FAMILY MEDICINE

## 2025-02-27 NOTE — PROGRESS NOTES
"Name: Elisabet Oropeza      : 1995      MRN: [id number]  Encounter Provider: Lamberto Bhatt MD  Encounter Date: 2025   Encounter department: [facility]    Assessment & Plan  TONEY (generalized anxiety disorder)  Doing very well on present care.  TONEY-7 = 1. Continue buspirone at present dose.  Recheck 6m - earlier if worse            History of Present Illness     f/u multiple med issues  - pt states that she is doing well from an anxiety standpoint.  She has been taking 10 mg in the morning and 15 mg at night with good effect.  The \"zombie like\" feeling that she was getting on the higher dose is better since decreasing morning dose to 10 mg.  TONEY-7 done  - pt still has intermittent episodes of pruritus.  Patient states that she has gotten somewhat better since stopping her dryer sheet use.  No rashes or other new products noted  - I reviewed labs form December. All WNL      Review of Systems   Constitutional: Negative.    HENT: Negative.     Eyes: Negative.    Respiratory: Negative.     Cardiovascular: Negative.    Gastrointestinal: Negative.    Genitourinary: Negative.    Musculoskeletal: Negative.    Skin:         Mild, intermittent pruritus without rash   Neurological: Negative.    Psychiatric/Behavioral: Negative.       Past Medical History:   Diagnosis Date   • Anxiety    • Decreased fetal movements in third trimester 2024   • Fetal macrosomia during pregnancy 2024   • GERD (gastroesophageal reflux disease)    • Headache(784.0)     Stress headaches   • Obesity affecting pregnancy in third trimester 2023    Continue ASA until 36 weeks      • Ovarian cyst     last assessed 2014   • Varicella     had vaccine   • Velamentous insertion of umbilical cord in third trimester 2024    Planning weekly NST w/ FRANCI at 36 weeks       Past Surgical History:   Procedure Laterality Date   •  SECTION  2024   • [place]  DELIVERY ONLY N/A " 2024    Procedure:  SECTION ();  Surgeon: Suha Irwin MD;  Location: [facility] ;  Service: Obstetrics   • WISDOM TOOTH EXTRACTION  2018     Family History   Problem Relation Age of Onset   • Gallbladder disease Mother    • Migraines Mother    • Hypertension Father    • Asthma Father    • Endometriosis Sister         Partial Hyst   • Ulcerative colitis Maternal Grandmother    • Other Maternal Grandfather         cardiac disorder     Social History     Tobacco Use   • Smoking status: Never     Passive exposure: Never   • Smokeless tobacco: Never   Vaping Use   • Vaping status: Never Used   Substance and Sexual Activity   • Alcohol use: Yes     Alcohol/week: 2.0 standard drinks of alcohol     Types: 2 Glasses of wine per week     Comment: I drink socially   • Drug use: Never   • Sexual activity: Yes     Partners: Male     Birth control/protection: OCP     Current Outpatient Medications on File Prior to Visit   Medication Sig   • busPIRone (BUSPAR) 10 mg tablet TAKE 1 TABLET (10 MG TOTAL) BY MOUTH EVERY MORNING.   • busPIRone (BUSPAR) 15 mg tablet Take 1 tablet (15 mg total) by mouth every evening   • norgestimate-ethinyl estradiol (Tri-Lo-Estarylla) 0.18/0.215/0.25 MG-25 MCG per tablet Take 1 tablet by mouth daily     Allergies   Allergen Reactions   • Augmentin [Amoxicillin-Pot Clavulanate] Hives     Pt. Breaks up in hives     Immunization History   Administered Date(s) Administered   • COVID-19 PFIZER VACCINE 0.3 ML IM 2021, 2021, 2021   • DTP 1996, 1996, 1996, 1997   • DTaP 5 2001   • HPV Quadrivalent 2010, 03/10/2010, 2010   • Hep B, adult 1996, 1996, 1997   • Hib (PRP-OMP) 1996, 1996, 1996, 1997   • IPV 1996, 1996, 1996, 2001   • Influenza, injectable, quadrivalent, preservative free 0.5 mL 2023   • Influenza, seasonal, injectable, preservative free 2024   •  "MMR 08/25/1997, 05/30/2001   • Meningococcal, Unknown Serogroups 05/29/2008, 06/11/2014   • Tdap 05/29/2008, 06/11/2014, 04/08/2024   • Tuberculin Skin Test-PPD Intradermal 05/06/1997   • Varicella 02/09/1998, 05/28/2008     Objective   /78   Pulse 84   Temp (!) 97.1 °F (36.2 °C)   Ht 5' 5\" (1.651 m)   Wt 100 kg (221 lb 4.8 oz)   SpO2 99%   BMI 36.83 kg/m²     Physical Exam  Vitals reviewed.   Constitutional:       Appearance: Normal appearance.   HENT:      Mouth/Throat:      Mouth: Mucous membranes are moist.   Eyes:      Extraocular Movements: Extraocular movements intact.      Conjunctiva/sclera: Conjunctivae normal.      Pupils: Pupils are equal, round, and reactive to light.   Cardiovascular:      Rate and Rhythm: Normal rate and regular rhythm.      Pulses: Normal pulses.   Pulmonary:      Effort: Pulmonary effort is normal.   Abdominal:      General: There is no distension.      Palpations: There is no mass.      Tenderness: There is no abdominal tenderness.   Musculoskeletal:      Cervical back: No tenderness.   Lymphadenopathy:      Cervical: No cervical adenopathy.   Skin:     General: Skin is warm.      Capillary Refill: Capillary refill takes less than 2 seconds.   Neurological:      General: No focal deficit present.      Mental Status: She is alert.   Psychiatric:         Mood and Affect: Mood normal.      Comments: PHQ-2/9 Depression Screening    Little interest or pleasure in doing things: 0 - not at all  Feeling down, depressed, or hopeless: 0 - not at all  PHQ-2 Score: 0  PHQ-2 Interpretation: Negative depression screen     TONEY-7 Flowsheet Screening    Flowsheet Row Most Recent Value   Over the last two weeks, how often have you been bothered by the following   problems?     Feeling nervous, anxious, or on edge 0   Not being able to stop or control worrying 0   Worrying too much about different things 0   Trouble relaxing  0   Being so restless that it's hard to sit still 0   Becoming " easily annoyed or irritable  1   Feeling afraid as if something awful might happen 0   How difficult have these problems made it for you to do your work, take   care of things at home, or get along with other people?  Not difficult at   all   TONEY Score  1

## 2025-02-27 NOTE — ASSESSMENT & PLAN NOTE
Doing very well on present care.  TONEY-7 = 1. Continue buspirone at present dose.  Recheck 6m - earlier if worse

## 2025-03-10 ENCOUNTER — PATIENT MESSAGE (OUTPATIENT)
Dept: FAMILY MEDICINE CLINIC | Facility: CLINIC | Age: 30
End: 2025-03-10

## 2025-03-11 ENCOUNTER — OFFICE VISIT (OUTPATIENT)
Dept: FAMILY MEDICINE CLINIC | Facility: CLINIC | Age: 30
End: 2025-03-11
Payer: COMMERCIAL

## 2025-03-11 VITALS
BODY MASS INDEX: 36.82 KG/M2 | OXYGEN SATURATION: 99 % | DIASTOLIC BLOOD PRESSURE: 82 MMHG | WEIGHT: 221 LBS | HEIGHT: 65 IN | HEART RATE: 74 BPM | SYSTOLIC BLOOD PRESSURE: 122 MMHG | TEMPERATURE: 97.6 F

## 2025-03-11 DIAGNOSIS — K21.9 GASTROESOPHAGEAL REFLUX DISEASE, UNSPECIFIED WHETHER ESOPHAGITIS PRESENT: ICD-10-CM

## 2025-03-11 DIAGNOSIS — L90.5 SCAR PAIN: Primary | ICD-10-CM

## 2025-03-11 DIAGNOSIS — F41.1 GAD (GENERALIZED ANXIETY DISORDER): ICD-10-CM

## 2025-03-11 PROCEDURE — 99213 OFFICE O/P EST LOW 20 MIN: CPT | Performed by: FAMILY MEDICINE

## 2025-03-11 RX ORDER — BUSPIRONE HYDROCHLORIDE 15 MG/1
15 TABLET ORAL EVERY EVENING
Qty: 30 TABLET | Refills: 5 | Status: SHIPPED | OUTPATIENT
Start: 2025-03-11

## 2025-03-11 NOTE — PATIENT COMMUNICATION
Spoke with patient and scheduled appt with Dr. Lees since Dr. Quiros does not have anythng available for 1 month . Patient told to do her follow up appt with Dr. Quiros.  Zayda Brooks

## 2025-03-11 NOTE — PROGRESS NOTES
"Name: Elisabet Oropeza      : 1995      MRN: [id number]  Encounter Provider: Gomez Smith MD  Encounter Date: 3/11/2025   Encounter department: [facility]  :  Assessment & Plan  Scar pain    Orders:  •  US abdominal wall; Future  •  Ambulatory Referral to Physical Therapy; Future    TONEY (generalized anxiety disorder)    Orders:  •  busPIRone (BUSPAR) 15 mg tablet; Take 1 tablet (15 mg total) by mouth every evening    Gastroesophageal reflux disease, unspecified whether esophagitis present      Of note for GERD: Pantoprazole worked before. Omeprazole caused diarrhea.        History of Present Illness   Chief Complaint   Patient presents with   • Abdominal Pain      HPI Patient notes increased pain in her right side of c/s scar. She notes its worse with activity like moving boxes. Unclear if exacerbated with her cycle. Since she delivered she's had more irregular BM (sometimes diarrhea, constipation). Will have regular BM for periods of time.   She often gets acid reflux and takes Tums, and just resumed daily Pepcid. She gets nausea from this also, with upper abdominal discomfort.   She gets regular menses.     Review of Systems   Constitutional:  Negative for chills and fever.   HENT:  Negative for congestion and sore throat.    Eyes:  Negative for pain and visual disturbance.   Respiratory:  Negative for cough and shortness of breath.    Cardiovascular:  Negative for chest pain and palpitations.   Gastrointestinal:  Positive for abdominal pain, constipation, diarrhea and nausea.   Genitourinary:  Negative for dysuria.   Musculoskeletal:  Negative for arthralgias and myalgias.   Skin:  Negative for rash and wound.   Neurological:  Negative for dizziness and headaches.   All other systems reviewed and are negative.      Objective   /82 (BP Location: Left arm, Patient Position: Sitting, Cuff Size: Large)   Pulse 74   Temp 97.6 °F (36.4 °C) (Temporal)   Ht 5' 5\" " (1.651 m)   Wt 100 kg (221 lb)   LMP 02/24/2025 (Exact Date)   SpO2 99%   BMI 36.78 kg/m²      Physical Exam  Vitals and nursing note reviewed.   Constitutional:       General: She is not in acute distress.     Appearance: She is well-developed.   HENT:      Head: Normocephalic and atraumatic.      Right Ear: External ear normal.      Left Ear: External ear normal.      Nose: Nose normal.   Eyes:      Conjunctiva/sclera: Conjunctivae normal.   Neck:      Trachea: No tracheal deviation.   Pulmonary:      Effort: Pulmonary effort is normal.   Abdominal:      Tenderness: There is abdominal tenderness (along c/s scar, right side only) in the suprapubic area.   Skin:     General: Skin is warm and dry.      Capillary Refill: Capillary refill takes less than 2 seconds.      Findings: No rash.   Neurological:      Mental Status: She is alert.      Cranial Nerves: No cranial nerve deficit.

## 2025-03-15 ENCOUNTER — OFFICE VISIT (OUTPATIENT)
Dept: URGENT CARE | Facility: MEDICAL CENTER | Age: 30
End: 2025-03-15
Payer: COMMERCIAL

## 2025-03-15 VITALS
SYSTOLIC BLOOD PRESSURE: 122 MMHG | HEART RATE: 80 BPM | OXYGEN SATURATION: 98 % | RESPIRATION RATE: 20 BRPM | TEMPERATURE: 98.9 F | DIASTOLIC BLOOD PRESSURE: 82 MMHG

## 2025-03-15 DIAGNOSIS — R31.9 HEMATURIA, UNSPECIFIED TYPE: Primary | ICD-10-CM

## 2025-03-15 LAB
SL AMB  POCT GLUCOSE, UA: ABNORMAL
SL AMB LEUKOCYTE ESTERASE,UA: ABNORMAL
SL AMB POCT BILIRUBIN,UA: ABNORMAL
SL AMB POCT BLOOD,UA: ABNORMAL
SL AMB POCT CLARITY,UA: CLEAR
SL AMB POCT COLOR,UA: YELLOW
SL AMB POCT KETONES,UA: ABNORMAL
SL AMB POCT NITRITE,UA: ABNORMAL
SL AMB POCT PH,UA: 5
SL AMB POCT SPECIFIC GRAVITY,UA: 1.01
SL AMB POCT URINE PROTEIN: ABNORMAL
SL AMB POCT UROBILINOGEN: ABNORMAL

## 2025-03-15 PROCEDURE — G0383 LEV 4 HOSP TYPE B ED VISIT: HCPCS | Performed by: FAMILY MEDICINE

## 2025-03-15 PROCEDURE — 81002 URINALYSIS NONAUTO W/O SCOPE: CPT | Performed by: FAMILY MEDICINE

## 2025-03-15 PROCEDURE — S9083 URGENT CARE CENTER GLOBAL: HCPCS | Performed by: FAMILY MEDICINE

## 2025-03-15 NOTE — PROGRESS NOTES
[facility]  Name: Elisabet Oropeza      : 1995      MRN: [id number]  Encounter Provider: Trina Etienne DO  Encounter Date: 3/15/2025   Encounter department: [facility] [place]  :  Assessment & Plan  Hematuria, unspecified type    Orders:  •  POCT urine dip  •  Urine culture; Future  ua showed tr blood and tr leukocytes. No gross hematuria.  Sent for culture but low suspicion for UTI  Did discuss with patient getting a repeat UA when she doesn't have urinary symptoms to ensure resolution of microscopic hematuria.  If symptoms persist follow up with PCP. Discussed Return/ED parameters with patient.         Patient Instructions  Follow up with PCP in 3-5 days.  Proceed to  ER if symptoms worsen.    If tests are performed, our office will contact you with results only if changes need to made to the care plan discussed with you at the visit. You can review your full results on [facility].    Chief Complaint:   Chief Complaint   Patient presents with   • Abdominal Cramping     Patient states she has had lower abdominal cramping and lower back pain    • Hematuria     Blood when wiping; unsure if it is UTI related      History of Present Illness   Difficulty Urinating   This is a new problem. The current episode started 1 to 4 weeks ago. The problem occurs intermittently. The problem has been waxing and waning. The quality of the pain is described as aching. The pain is at a severity of 3/10. The pain is mild. There has been no fever. The fever has been present for Less than 1 day. She is Sexually active. There is No history of pyelonephritis. Associated symptoms include flank pain, hematuria, hesitancy, nausea and urgency. Pertinent negatives include no chills, discharge, frequency, possible pregnancy, sweats or vomiting. She has tried increased fluids for the symptoms. The treatment provided no relief. There is no history of catheterization, kidney stones, recurrent UTIs, a  single kidney or urinary stasis.         Review of Systems   Constitutional:  Negative for chills.   Gastrointestinal:  Positive for nausea. Negative for vomiting.   Genitourinary:  Positive for dysuria, flank pain, hematuria, hesitancy and urgency. Negative for frequency.     Past Medical History   Past Medical History:   Diagnosis Date   • Anxiety    • Decreased fetal movements in third trimester 2024   • Fetal macrosomia during pregnancy 2024   • GERD (gastroesophageal reflux disease)    • Headache(784.0)     Stress headaches   • Obesity affecting pregnancy in third trimester 2023    Continue ASA until 36 weeks      • Ovarian cyst     last assessed 2014   • Varicella     had vaccine   • Velamentous insertion of umbilical cord in third trimester 2024    Planning weekly NST w/ FRANCI at 36 weeks       Past Surgical History:   Procedure Laterality Date   •  SECTION  2024   • [place]  DELIVERY ONLY N/A 2024    Procedure:  SECTION ();  Surgeon: Suha Irwin MD;  Location: [facility] ;  Service: Obstetrics   • WISDOM TOOTH EXTRACTION  2018     Family History   Problem Relation Age of Onset   • Gallbladder disease Mother    • Migraines Mother    • Hypertension Father    • Asthma Father    • Endometriosis Sister         Partial Hyst   • Ulcerative colitis Maternal Grandmother    • Other Maternal Grandfather         cardiac disorder     she reports that she has never smoked. She has never been exposed to tobacco smoke. She has never used smokeless tobacco. She reports current alcohol use of about 2.0 standard drinks of alcohol per week. She reports that she does not use drugs.  Current Outpatient Medications   Medication Instructions   • busPIRone (BUSPAR) 10 mg, Oral, Every morning   • busPIRone (BUSPAR) 15 mg, Oral, Every evening   • norgestimate-ethinyl estradiol (Tri-Lo-Estarylla) 0.18/0.215/0.25 MG-25 MCG per tablet 1 tablet, Oral, Daily     Allergies  "  Allergen Reactions   • Augmentin [Amoxicillin-Pot Clavulanate] Hives     Pt. Breaks up in hives        Objective   /82   Pulse 80   Temp 98.9 °F (37.2 °C) (Temporal)   Resp 20   LMP 02/24/2025 (Exact Date)   SpO2 98%      Physical Exam  Vitals and nursing note reviewed.   Constitutional:       General: She is not in acute distress.     Appearance: Normal appearance. She is not ill-appearing or toxic-appearing.   HENT:      Head: Normocephalic and atraumatic.   Cardiovascular:      Rate and Rhythm: Normal rate and regular rhythm.      Heart sounds: No murmur heard.  Pulmonary:      Effort: Pulmonary effort is normal. No respiratory distress.      Breath sounds: Normal breath sounds.   Abdominal:      General: Abdomen is flat. There is no distension.      Palpations: Abdomen is soft.      Tenderness: There is abdominal tenderness. There is no right CVA tenderness, left CVA tenderness, guarding or rebound.      Comments: Minimal suprapubic tenderness.   Skin:     General: Skin is warm and dry.      Capillary Refill: Capillary refill takes less than 2 seconds.   Neurological:      General: No focal deficit present.      Mental Status: She is alert and oriented to person, place, and time.   Psychiatric:         Mood and Affect: Mood normal.         Behavior: Behavior normal.         Thought Content: Thought content normal.         Portions of the record may have been created with voice recognition software.  Occasional wrong word or \"sound a like\" substitutions may have occurred due to the inherent limitations of voice recognition software.  Read the chart carefully and recognize, using context, where substitutions have occurred.  "

## 2025-03-17 ENCOUNTER — RESULTS FOLLOW-UP (OUTPATIENT)
Dept: URGENT CARE | Facility: MEDICAL CENTER | Age: 30
End: 2025-03-17

## 2025-04-21 ENCOUNTER — HOSPITAL ENCOUNTER (OUTPATIENT)
Dept: RADIOLOGY | Facility: MEDICAL CENTER | Age: 30
Discharge: HOME | End: 2025-04-21
Attending: FAMILY MEDICINE
Payer: COMMERCIAL

## 2025-04-21 DIAGNOSIS — L90.5 SCAR PAIN: ICD-10-CM

## 2025-04-21 PROCEDURE — 76705 ECHO EXAM OF ABDOMEN: CPT

## 2025-04-28 ENCOUNTER — RESULTS FOLLOW-UP (OUTPATIENT)
Dept: FAMILY MEDICINE CLINIC | Facility: CLINIC | Age: 30
End: 2025-04-28

## 2025-04-28 DIAGNOSIS — N83.201 RIGHT OVARIAN CYST: Primary | ICD-10-CM

## 2025-05-29 ENCOUNTER — OFFICE VISIT (OUTPATIENT)
Dept: FAMILY MEDICINE CLINIC | Facility: CLINIC | Age: 30
End: 2025-05-29
Payer: COMMERCIAL

## 2025-05-29 VITALS
DIASTOLIC BLOOD PRESSURE: 80 MMHG | HEART RATE: 77 BPM | OXYGEN SATURATION: 98 % | HEIGHT: 65 IN | BODY MASS INDEX: 37.15 KG/M2 | TEMPERATURE: 97.9 F | SYSTOLIC BLOOD PRESSURE: 126 MMHG | RESPIRATION RATE: 12 BRPM | WEIGHT: 223 LBS

## 2025-05-29 DIAGNOSIS — Z00.00 ANNUAL PHYSICAL EXAM: Primary | ICD-10-CM

## 2025-05-29 DIAGNOSIS — K21.9 GASTROESOPHAGEAL REFLUX DISEASE, UNSPECIFIED WHETHER ESOPHAGITIS PRESENT: ICD-10-CM

## 2025-05-29 PROCEDURE — 99395 PREV VISIT EST AGE 18-39: CPT | Performed by: FAMILY MEDICINE

## 2025-05-29 PROCEDURE — 99213 OFFICE O/P EST LOW 20 MIN: CPT | Performed by: FAMILY MEDICINE

## 2025-05-29 RX ORDER — PANTOPRAZOLE SODIUM 40 MG/1
40 TABLET, DELAYED RELEASE ORAL DAILY
Qty: 30 TABLET | Refills: 0 | Status: SHIPPED | OUTPATIENT
Start: 2025-05-29

## 2025-05-29 NOTE — PROGRESS NOTES
Adult Annual Physical  Name: Elisabet Oropeza      : 1995      MRN: [id number]  Encounter Provider: Lamberto Bhatt MD  Encounter Date: 2025   Encounter department: [facility]    :  Assessment & Plan  Annual physical exam         Gastroesophageal reflux disease, unspecified whether esophagitis present  I reviewed with patient.  Change famotidine to Protonix 40 mg daily x 3 days.  Doing well, will decrease to 20 mg for 4 weeks before transitioning back to famotidine.  Discussed diet.  Recheck 3 to 4 weeks if not improving  Orders:  •  pantoprazole (PROTONIX) 40 mg tablet; Take 1 tablet (40 mg total) by mouth daily        Preventive Screenings:    - Cervical cancer screening: screening up-to-date          History of Present Illness     Adult Annual Physical:  Patient presents for annual physical.   - pt notes increased reflux despite use of famotidine.  Patient is interested in other treatments..     Diet and Physical Activity:  - Diet/Nutrition: portion control, adequate fiber intake and consuming 3-5 servings of fruits/vegetables daily.  - Exercise: walking, 5-7 times a week on average and less than 30 minutes on average.    Depression Screening:  - PHQ-2 Score: 0    General Health:  - Sleep: sleeps well and 7-8 hours of sleep on average.  - Hearing: normal hearing right ear and normal hearing left ear.  - Vision: vision problems, wears glasses and contacts and most recent eye exam < 1 year ago.  - Dental: regular dental visits, brushes teeth twice daily and floss regularly.    /GYN Health:  - Follows with GYN: yes.   - Menopause: premenopausal.   - Last menstrual cycle: 2025.   - History of STDs: no    Advanced Care Planning:  - Has an advanced directive?: no    - Has a durable medical POA?: no    - ACP document given to patient?: no      Review of Systems   Constitutional: Negative.    HENT: Negative.     Eyes: Negative.    Respiratory: Negative.    "  Cardiovascular: Negative.    Gastrointestinal: Negative.         (+)GERD   Endocrine: Negative.    Genitourinary: Negative.    Musculoskeletal: Negative.    Skin: Negative.    Allergic/Immunologic: Negative.    Neurological: Negative.    Hematological: Negative.    Psychiatric/Behavioral: Negative.       Past Medical History   Past Medical History[1]  Past Surgical History[2]  Family History[3]   reports that she has never smoked. She has never been exposed to tobacco smoke. She has never used smokeless tobacco. She reports current alcohol use of about 2.0 standard drinks of alcohol per week. She reports that she does not use drugs.  Current Outpatient Medications   Medication Instructions   • busPIRone (BUSPAR) 10 mg, Oral, Every morning   • busPIRone (BUSPAR) 15 mg, Oral, Every evening   • norgestimate-ethinyl estradiol (Tri-Lo-Estarylla) 0.18/0.215/0.25 MG-25 MCG per tablet 1 tablet, Oral, Daily   • pantoprazole (PROTONIX) 40 mg, Oral, Daily   • Prenatal Vit-Fe Fumarate-FA (PRENATAL VITAMIN PO) Take by mouth   Allergies[4]   Medications Ordered Prior to Encounter[5]   Social History[6]    Objective   /80   Pulse 77   Temp 97.9 °F (36.6 °C)   Resp 12   Ht 5' 5\" (1.651 m)   Wt 101 kg (223 lb)   LMP 04/27/2025 (Exact Date)   SpO2 98%   BMI 37.11 kg/m²     Physical Exam  Vitals reviewed.   Constitutional:       Appearance: She is well-developed.   HENT:      Head: Normocephalic and atraumatic.      Right Ear: Tympanic membrane, ear canal and external ear normal.      Left Ear: Tympanic membrane, ear canal and external ear normal.      Nose: Nose normal.      Mouth/Throat:      Mouth: Mucous membranes are moist.     Eyes:      Extraocular Movements: Extraocular movements intact.      Conjunctiva/sclera: Conjunctivae normal.      Pupils: Pupils are equal, round, and reactive to light.     Neck:      Thyroid: No thyromegaly.      Vascular: No JVD.     Cardiovascular:      Rate and Rhythm: Normal rate and " regular rhythm.      Pulses: Normal pulses.      Heart sounds: Normal heart sounds. No murmur heard.  Pulmonary:      Effort: Pulmonary effort is normal.      Breath sounds: Normal breath sounds. No wheezing.   Abdominal:      General: Bowel sounds are normal. There is no distension.      Palpations: Abdomen is soft. There is no mass.      Tenderness: There is no abdominal tenderness.     Musculoskeletal:         General: No swelling, tenderness or deformity. Normal range of motion.      Cervical back: Normal range of motion and neck supple. No tenderness. No muscular tenderness.      Right lower leg: No edema.      Left lower leg: No edema.   Lymphadenopathy:      Cervical: No cervical adenopathy.     Skin:     General: Skin is warm.      Capillary Refill: Capillary refill takes less than 2 seconds.     Neurological:      General: No focal deficit present.      Mental Status: She is alert and oriented to person, place, and time.      Cranial Nerves: No cranial nerve deficit.      Sensory: No sensory deficit.      Motor: No weakness or abnormal muscle tone.      Coordination: Coordination normal.      Gait: Gait normal.      Deep Tendon Reflexes: Reflexes normal.     Psychiatric:         Mood and Affect: Mood normal.         Behavior: Behavior normal.         Thought Content: Thought content normal.         Judgment: Judgment normal.      Comments: PHQ-2/9 Depression Screening    Little interest or pleasure in doing things: 0 - not at all  Feeling down, depressed, or hopeless: 0 - not at all  PHQ-2 Score: 0  PHQ-2 Interpretation: Negative depression screen                     [1]  Past Medical History:  Diagnosis Date   • Anxiety    • Decreased fetal movements in third trimester 06/19/2024   • Fetal macrosomia during pregnancy 06/25/2024   • GERD (gastroesophageal reflux disease)    • Headache(784.0)     Stress headaches   • Obesity affecting pregnancy in third trimester 12/26/2023    Continue ASA until 36 weeks       • Ovarian cyst     last assessed 2014   • Varicella     had vaccine   • Velamentous insertion of umbilical cord in third trimester 2024    Planning weekly NST w/ FRANCI at 36 weeks     [2]  Past Surgical History:  Procedure Laterality Date   •  SECTION  2024   • [facility] N/A 2024    Procedure:  SECTION ();  Surgeon: uSha Irwin MD;  Location: [facility] ;  Service: Obstetrics   • WISDOM TOOTH EXTRACTION     [3]  Family History  Problem Relation Name Age of Onset   • Gallbladder disease Mother Maddi    • Migraines Mother Maddi    • Hypertension Father Stefan    • Asthma Father Stefan    • Endometriosis Sister          Partial Hyst   • Ulcerative colitis Maternal Grandmother     • Other Maternal Grandfather          cardiac disorder   [4]  Allergies  Allergen Reactions   • Augmentin [Amoxicillin-Pot Clavulanate] Hives     Pt. Breaks up in hives   [5]  Current Outpatient Medications on File Prior to Visit   Medication Sig Dispense Refill   • busPIRone (BUSPAR) 10 mg tablet TAKE 1 TABLET (10 MG TOTAL) BY MOUTH EVERY MORNING. 90 tablet 0   • busPIRone (BUSPAR) 15 mg tablet Take 1 tablet (15 mg total) by mouth every evening 30 tablet 5   • Prenatal Vit-Fe Fumarate-FA (PRENATAL VITAMIN PO) Take by mouth     • norgestimate-ethinyl estradiol (Tri-Lo-Estarylla) 0.18/0.215/0.25 MG-25 MCG per tablet Take 1 tablet by mouth daily (Patient not taking: Reported on 2025) 28 tablet 3     No current facility-administered medications on file prior to visit.   [6]  Social History  Tobacco Use   • Smoking status: Never     Passive exposure: Never   • Smokeless tobacco: Never   Vaping Use   • Vaping status: Never Used   Substance and Sexual Activity   • Alcohol use: Yes     Alcohol/week: 2.0 standard drinks of alcohol     Types: 2 Glasses of wine per week     Comment: I drink socially   • Drug use: Never   • Sexual activity: Yes     Partners: Male     Birth  control/protection: OCP

## 2025-05-29 NOTE — PATIENT INSTRUCTIONS
"Patient Education     Routine physical for adults   The Basics   Written by the doctors and editors at [facility]   What is a physical? -- A physical is a routine visit, or \"check-up,\" with your doctor. You might also hear it called a \"wellness visit\" or \"preventive visit.\"  During each visit, the doctor will:   Ask about your physical and mental health   Ask about your habits, behaviors, and lifestyle   Do an exam   Give you vaccines if needed   Talk to you about any medicines you take   Give advice about your health   Answer your questions  Getting regular check-ups is an important part of taking care of your health. It can help your doctor find and treat any problems you have. But it's also important for preventing health problems.  A routine physical is different from a \"sick visit.\" A sick visit is when you see a doctor because of a health concern or problem. Since physicals are scheduled ahead of time, you can think about what you want to ask the doctor.  How often should I get a physical? -- It depends on your age and health. In general, for people age 21 years and older:   If you are younger than 50 years, you might be able to get a physical every 3 years.   If you are 50 years or older, your doctor might recommend a physical every year.  If you have an ongoing health condition, like diabetes or high blood pressure, your doctor will probably want to see you more often.  What happens during a physical? -- In general, each visit will include:   Physical exam - The doctor or nurse will check your height, weight, heart rate, and blood pressure. They will also look at your eyes and ears. They will ask about how you are feeling and whether you have any symptoms that bother you.   Medicines - It's a good idea to bring a list of all the medicines you take to each doctor visit. Your doctor will talk to you about your medicines and answer any questions. Tell them if you are having any side effects that bother you. You " "should also tell them if you are having trouble paying for any of your medicines.   Habits and behaviors - This includes:   Your diet   Your exercise habits   Whether you smoke, drink alcohol, or use drugs   Whether you are sexually active   Whether you feel safe at home  Your doctor will talk to you about things you can do to improve your health and lower your risk of health problems. They will also offer help and support. For example, if you want to quit smoking, they can give you advice and might prescribe medicines. If you want to improve your diet or get more physical activity, they can help you with this, too.   Lab tests, if needed - The tests you get will depend on your age and situation. For example, your doctor might want to check your:   Cholesterol   Blood sugar   Iron level   Vaccines - The recommended vaccines will depend on your age, health, and what vaccines you already had. Vaccines are very important because they can prevent certain serious or deadly infections.   Discussion of screening - \"Screening\" means checking for diseases or other health problems before they cause symptoms. Your doctor can recommend screening based on your age, risk, and preferences. This might include tests to check for:   Cancer, such as breast, prostate, cervical, ovarian, colorectal, prostate, lung, or skin cancer   Sexually transmitted infections, such as chlamydia and gonorrhea   Mental health conditions like depression and anxiety  Your doctor will talk to you about the different types of screening tests. They can help you decide which screenings to have. They can also explain what the results might mean.   Answering questions - The physical is a good time to ask the doctor or nurse questions about your health. If needed, they can refer you to other doctors or specialists, too.  Adults older than 65 years often need other care, too. As you get older, your doctor will talk to you about:   How to prevent falling at " home   Hearing or vision tests   Memory testing   How to take your medicines safely   Making sure that you have the help and support you need at home  All topics are updated as new evidence becomes available and our peer review process is complete.  This topic retrieved from [employer] on: May 02, 2024.  Topic 474686 Version 1.0  Release: 32.4.3 - C32.122  © 2024 UpToDate, Inc. and/or its affiliates. All rights reserved.  Consumer Information Use and Disclaimer   Disclaimer: This generalized information is a limited summary of diagnosis, treatment, and/or medication information. It is not meant to be comprehensive and should be used as a tool to help the user understand and/or assess potential diagnostic and treatment options. It does NOT include all information about conditions, treatments, medications, side effects, or risks that may apply to a specific patient. It is not intended to be medical advice or a substitute for the medical advice, diagnosis, or treatment of a health care provider based on the health care provider's examination and assessment of a patient's specific and unique circumstances. Patients must speak with a health care provider for complete information about their health, medical questions, and treatment options, including any risks or benefits regarding use of medications. This information does not endorse any treatments or medications as safe, effective, or approved for treating a specific patient. UpToDate, Inc. and its affiliates disclaim any warranty or liability relating to this information or the use thereof.The use of this information is governed by the Terms of Use, available at https://www.woltersDiary.comuwer.com/en/know/clinical-effectiveness-terms. 2024© UpToDate, Inc. and its affiliates and/or licensors. All rights reserved.  Copyright   © 2024 UpToDate, Inc. and/or its affiliates. All rights reserved.

## 2025-05-29 NOTE — ASSESSMENT & PLAN NOTE
I reviewed with patient.  Change famotidine to Protonix 40 mg daily x 3 days.  Doing well, will decrease to 20 mg for 4 weeks before transitioning back to famotidine.  Discussed diet.  Recheck 3 to 4 weeks if not improving  Orders:  •  pantoprazole (PROTONIX) 40 mg tablet; Take 1 tablet (40 mg total) by mouth daily

## 2025-05-30 ENCOUNTER — NURSE TRIAGE (OUTPATIENT)
Age: 30
End: 2025-05-30

## 2025-05-30 NOTE — TELEPHONE ENCOUNTER
Patient calling back in advising bleeding is now a dark red color now, is coming out on the liner and cramping is worse now as well. Currently moderate. She has not taken anything for pain. Advised ok to take tylenol.    [facility] to on call provider to update. Provider advised to continue to monitor and take tylenol.  If bleeding or pain increases go to ED for further evaluation.  Pt verbalized understanding, no further questions or concerns at this time.

## 2025-05-30 NOTE — TELEPHONE ENCOUNTER
"REASON FOR CONVERSATION: Vaginal Bleeding - Pregnant    SYMPTOMS: light pink spotting with wiping     OTHER HEALTH INFORMATION: Patient 5w2d by LMP 4/23/25 with light pink vaginal bleeding with wiping starting late this morning, not enough to get on panty liner. Mild 3/10 abdominal cramping. Patient reports having intercourse last night.     PROTOCOL DISPOSITION: Home Care    CARE ADVICE PROVIDED: Reviewed vascular cervix in pregnancy, pelvic rest x 1 week. Reviewed ectopic precautions. Return call if bleeding or pain increases, report to ED for severe pain or heavy bleeding.     [facility] Dr Almeida     PRACTICE FOLLOW-UP: none           Reason for Disposition   SPOTTING after sexual intercourse (single or brief episode)    Answer Assessment - Initial Assessment Questions  1. ONSET: \"When did this bleeding start?\"        Late this morning   2. BLEEDING SEVERITY: \"Describe the bleeding that you are having.\" \"How much bleeding is there?\"       Light pink with wiping, it is not getting on panty liner   3. ABDOMEN PAIN: \"Do you have any pain?\" \"How bad is the pain?\"  (e.g., Scale 0-10; none, mild, moderate, or severe)      3/10   4. PREGNANCY: \"Do you know how many weeks or months pregnant you are?\" \"When was the first day of your last normal menstrual period?\"      LMP 4/23/25 5w2d   5. ULTRASOUND: \"Have you had an ultrasound during this pregnancy?\"  Note: To confirm intrauterine pregnancy, placenta location.      Denies   6. HEMODYNAMIC STATUS: \"Are you weak or feeling lightheaded?\" If Yes, ask: \"Can you stand and walk normally?\"       Denies   7. OTHER SYMPTOMS: \"What other symptoms are you having with the bleeding?\" (e.g., passed tissue, vaginal discharge, fever, menstrual-type cramps)      Mild breast tenderness    Protocols used: Pregnancy - Vaginal Bleeding Less Than 20 Weeks EGA-Adult-[place]    "

## 2025-05-31 ENCOUNTER — HOSPITAL ENCOUNTER (EMERGENCY)
Facility: HOSPITAL | Age: 30
Discharge: HOME | End: 2025-05-31
Attending: EMERGENCY MEDICINE | Admitting: EMERGENCY MEDICINE
Payer: COMMERCIAL

## 2025-05-31 ENCOUNTER — APPOINTMENT (EMERGENCY)
Dept: ULTRASOUND IMAGING | Facility: HOSPITAL | Age: 30
End: 2025-05-31
Payer: COMMERCIAL

## 2025-05-31 VITALS
DIASTOLIC BLOOD PRESSURE: 90 MMHG | TEMPERATURE: 98.2 F | OXYGEN SATURATION: 100 % | SYSTOLIC BLOOD PRESSURE: 141 MMHG | RESPIRATION RATE: 20 BRPM | HEART RATE: 103 BPM

## 2025-05-31 DIAGNOSIS — O20.0 THREATENED ABORTION: Primary | ICD-10-CM

## 2025-05-31 LAB
ABO GROUP BLD: NORMAL
ALBUMIN SERPL BCG-MCNC: 4.4 G/DL (ref 3.5–5)
ALP SERPL-CCNC: 65 U/L (ref 34–104)
ALT SERPL W P-5'-P-CCNC: 16 U/L (ref 7–52)
ANION GAP SERPL CALCULATED.3IONS-SCNC: 9 MMOL/L (ref 4–13)
AST SERPL W P-5'-P-CCNC: 14 U/L (ref 13–39)
B-HCG SERPL-ACNC: 16.1 MIU/ML (ref 0–5)
BACTERIA UR QL AUTO: NORMAL /HPF
BASOPHILS # BLD AUTO: 0.04 THOUSANDS/ÂΜL (ref 0–0.1)
BASOPHILS NFR BLD AUTO: 0 % (ref 0–1)
BILIRUB SERPL-MCNC: 0.4 MG/DL (ref 0.2–1)
BILIRUB UR QL STRIP: NEGATIVE
BLD GP AB SCN SERPL QL: NEGATIVE
BUN SERPL-MCNC: 13 MG/DL (ref 5–25)
CALCIUM SERPL-MCNC: 9.2 MG/DL (ref 8.4–10.2)
CHLORIDE SERPL-SCNC: 105 MMOL/L (ref 96–108)
CLARITY UR: CLEAR
CO2 SERPL-SCNC: 23 MMOL/L (ref 21–32)
COLOR UR: COLORLESS
CREAT SERPL-MCNC: 0.75 MG/DL (ref 0.6–1.3)
EOSINOPHIL # BLD AUTO: 0.04 THOUSAND/ÂΜL (ref 0–0.61)
EOSINOPHIL NFR BLD AUTO: 0 % (ref 0–6)
ERYTHROCYTE [DISTWIDTH] IN BLOOD BY AUTOMATED COUNT: 13.3 % (ref 11.6–15.1)
GFR SERPL CREATININE-BSD FRML MDRD: 108 ML/MIN/1.73SQ M
GLUCOSE SERPL-MCNC: 92 MG/DL (ref 65–140)
GLUCOSE UR STRIP-MCNC: NEGATIVE MG/DL
HCT VFR BLD AUTO: 41.4 % (ref 34.8–46.1)
HGB BLD-MCNC: 13.7 G/DL (ref 11.5–15.4)
HGB UR QL STRIP.AUTO: ABNORMAL
IMM GRANULOCYTES # BLD AUTO: 0.03 THOUSAND/UL (ref 0–0.2)
IMM GRANULOCYTES NFR BLD AUTO: 0 % (ref 0–2)
KETONES UR STRIP-MCNC: NEGATIVE MG/DL
LEUKOCYTE ESTERASE UR QL STRIP: NEGATIVE
LYMPHOCYTES # BLD AUTO: 2.79 THOUSANDS/ÂΜL (ref 0.6–4.47)
LYMPHOCYTES NFR BLD AUTO: 29 % (ref 14–44)
MCH RBC QN AUTO: 30.3 PG (ref 26.8–34.3)
MCHC RBC AUTO-ENTMCNC: 33.1 G/DL (ref 31.4–37.4)
MCV RBC AUTO: 92 FL (ref 82–98)
MONOCYTES # BLD AUTO: 0.46 THOUSAND/ÂΜL (ref 0.17–1.22)
MONOCYTES NFR BLD AUTO: 5 % (ref 4–12)
NEUTROPHILS # BLD AUTO: 6.27 THOUSANDS/ÂΜL (ref 1.85–7.62)
NEUTS SEG NFR BLD AUTO: 66 % (ref 43–75)
NITRITE UR QL STRIP: NEGATIVE
NON-SQ EPI CELLS URNS QL MICRO: NORMAL /HPF
NRBC BLD AUTO-RTO: 0 /100 WBCS
PH UR STRIP.AUTO: 6 [PH]
PLATELET # BLD AUTO: 285 THOUSANDS/UL (ref 149–390)
PMV BLD AUTO: 10.5 FL (ref 8.9–12.7)
POTASSIUM SERPL-SCNC: 3.7 MMOL/L (ref 3.5–5.3)
PROT SERPL-MCNC: 7.6 G/DL (ref 6.4–8.4)
PROT UR STRIP-MCNC: NEGATIVE MG/DL
RBC # BLD AUTO: 4.52 MILLION/UL (ref 3.81–5.12)
RBC #/AREA URNS AUTO: NORMAL /HPF
RH BLD: POSITIVE
SODIUM SERPL-SCNC: 137 MMOL/L (ref 135–147)
SP GR UR STRIP.AUTO: 1 (ref 1–1.03)
SPECIMEN EXPIRATION DATE: NORMAL
UROBILINOGEN UR STRIP-ACNC: <2 MG/DL
WBC # BLD AUTO: 9.63 THOUSAND/UL (ref 4.31–10.16)
WBC #/AREA URNS AUTO: NORMAL /HPF

## 2025-05-31 PROCEDURE — 36415 COLL VENOUS BLD VENIPUNCTURE: CPT | Performed by: EMERGENCY MEDICINE

## 2025-05-31 PROCEDURE — 99284 EMERGENCY DEPT VISIT MOD MDM: CPT | Performed by: EMERGENCY MEDICINE

## 2025-05-31 PROCEDURE — 85025 COMPLETE CBC W/AUTO DIFF WBC: CPT | Performed by: EMERGENCY MEDICINE

## 2025-05-31 PROCEDURE — 99284 EMERGENCY DEPT VISIT MOD MDM: CPT

## 2025-05-31 PROCEDURE — 84702 CHORIONIC GONADOTROPIN TEST: CPT | Performed by: EMERGENCY MEDICINE

## 2025-05-31 PROCEDURE — 86900 BLOOD TYPING SEROLOGIC ABO: CPT | Performed by: EMERGENCY MEDICINE

## 2025-05-31 PROCEDURE — 80053 COMPREHEN METABOLIC PANEL: CPT | Performed by: EMERGENCY MEDICINE

## 2025-05-31 PROCEDURE — 86850 RBC ANTIBODY SCREEN: CPT | Performed by: EMERGENCY MEDICINE

## 2025-05-31 PROCEDURE — 86901 BLOOD TYPING SEROLOGIC RH(D): CPT | Performed by: EMERGENCY MEDICINE

## 2025-05-31 PROCEDURE — 76815 OB US LIMITED FETUS(S): CPT

## 2025-05-31 PROCEDURE — 81001 URINALYSIS AUTO W/SCOPE: CPT | Performed by: EMERGENCY MEDICINE

## 2025-05-31 NOTE — ED PROVIDER NOTES
Time reflects when diagnosis was documented in both MDM as applicable and the Disposition within this note       Time User Action Codes Description Comment    2025  3:39 PM Lamberto Maloney Add [O20.0] Threatened            ED Disposition       ED Disposition   Discharge    Condition   Stable    Date/Time   Sat May 31, 2025  3:39 PM    Comment   Elisabet Oropeza discharge to home/self care.                   Assessment & Plan       Medical Decision Making  Despite reports of abdominal pain the patient has no abdominal tenderness to palpation, the patient is complaining of vaginal bleeding while pregnant, reports she did take 2 pregnancy tests which were positive earlier this week, she has had no recent trauma, my suspicion for traumatic cause of her hemorrhage is very low, she will be evaluated for a subchorionic hemorrhage versus a threatened .  The patient reports that she is O+, and if her type and screen shows that she is indeed positive she will not need RhoGAM.  As the patient is not currently in any pain analgesia is unnecessary at this time but will be given if it becomes necessary.    No intrauterine pregnancy was seen on ultrasound, the patient's quantitative hCG is below the discriminatory zone, no other abnormalities were found, the patient will be discharged home and has been instructed to return in 2 days for repeat hCG testing, and to follow-up with obstetrics.  She is also been given return indications that if any symptoms worsen or new symptoms occur such as increased pain, lightheadedness, or anything unusual to return to the emergency department immediately.  The patient is comfortable with treatment plan and is safe for discharge.    Disposition: Patient stable for outpatient management. Discussed need for follow up with their primary doctor or specialist to review all results, including incidental findings as below. Patient discharged with explanation of ED workup and  diagnosis, instructions on how to obtain outpatient follow up, care instructions at home, and strict return precautions if patient develops new or worsening symptoms. Patients questions answered and agreeable with discharge plan.        PLEASE NOTE:  This encounter was completed utilizing the [email or web address] Direct Speech Voice Recognition Software. Grammatical errors, random word insertions, pronoun errors and incomplete sentences are occasional inherent consequences of the system due to software limitations, ambient noise and hardware issues.These may be missed by proof reading prior to affixing electronic signature. Any questions or concerns about the content, text or information contained within the body of this dictation should be directly addressed to the physician for clarification. Please do not hesitate to call me directly if you have any questions or concerns.      Amount and/or Complexity of Data Reviewed  Labs: ordered.  Radiology: ordered.             Medications - No data to display    ED Risk Strat Scores                    No data recorded                            History of Present Illness       Chief Complaint   Patient presents with    Vaginal Bleeding     Patient here for eval of vaginal bleeding that started yesterday. +Abd cramps 3/10 +light headedness +HA . Reports quarter size clots. Hx Cyst Rt ovary. Denies N/V/D/vision changes.        Past Medical History[1]   Past Surgical History[2]   Family History[3]   Social History[4]   E-Cigarette/Vaping    E-Cigarette Use Never User       E-Cigarette/Vaping Substances    Nicotine No     THC No     CBD No     Flavoring No     Other No     Unknown No       I have reviewed and agree with the history as documented.     30-year-old female presents, , LMP 2025, reports she has had 2 days of vaginal bleeding which she describes as beginning with some spotting that now has progressed to going through a pad approximately every 3-4 hours, reports  this is seeming like her normal period, she reports minimal pain aside from some suprapubic cramping, and reports she has had no recent trauma, no fevers, chest pain, cough, shortness of breath, nausea, vomiting, no abdominal pain, no constipation or diarrhea, no dysuria, she does report some mild intermittent lightheadedness, reports she drinks occasionally, does not smoke or use drugs, has no other medical problems, takes BuSpar for anxiety, is allergic to amoxicillin, her only surgery is a .        Review of Systems   Constitutional:  Negative for fever.   Respiratory:  Negative for cough and shortness of breath.    Cardiovascular:  Negative for chest pain.   Gastrointestinal:  Positive for abdominal pain. Negative for constipation, diarrhea, nausea and vomiting.   Genitourinary:  Negative for dysuria and hematuria.   Neurological:  Positive for light-headedness. Negative for headaches.           Objective       ED Triage Vitals [25 1320]   Temperature Pulse Blood Pressure Respirations SpO2 Patient Position - Orthostatic VS   98.2 °F (36.8 °C) 103 141/90 20 100 % Sitting      Temp Source Heart Rate Source BP Location FiO2 (%) Pain Score    Oral Monitor Right arm -- --      Vitals      Date and Time Temp Pulse SpO2 Resp BP Pain Score FACES Pain Rating User   25 1320 98.2 °F (36.8 °C) 103 100 % 20 141/90 -- -- DP            Physical Exam  Vitals and nursing note reviewed.   Constitutional:       General: She is not in acute distress.     Appearance: Normal appearance. She is well-developed.   HENT:      Head: Normocephalic and atraumatic.     Eyes:      Extraocular Movements: Extraocular movements intact.      Conjunctiva/sclera: Conjunctivae normal.       Cardiovascular:      Rate and Rhythm: Normal rate and regular rhythm.   Pulmonary:      Effort: Pulmonary effort is normal. No respiratory distress.      Breath sounds: Normal breath sounds.   Abdominal:      General: There is no distension.       Palpations: Abdomen is soft.      Tenderness: There is no abdominal tenderness. There is no right CVA tenderness or left CVA tenderness.     Musculoskeletal:         General: No swelling.      Cervical back: Normal range of motion.     Skin:     General: Skin is warm and dry.      Capillary Refill: Capillary refill takes less than 2 seconds.     Neurological:      General: No focal deficit present.      Mental Status: She is alert and oriented to person, place, and time.     Psychiatric:         Mood and Affect: Mood normal.         Results Reviewed       Procedure Component Value Units Date/Time    hCG, quantitative [[id number]]  (Abnormal) Collected: 05/31/25 1411    Lab Status: Final result Specimen: Blood from Arm, Right Updated: 05/31/25 1453     HCG, Quant 16.1 mIU/mL     Narrative:       Expected Ranges:    HCG results between 5.0 and 25.0 mIU/mL may be indicative of early pregnancy but should be interpreted in light of the total clinical presentation.    HCG can rise to detectable levels in teressa and post menopausal women (0-11.6 mIU/mL).     Approximate               Approximate HCG  Gestation age          Concentration ( mIU/mL)  _____________          ______________________   Weeks                      HCG values  0.2-1                       5-50  1-2                           2-3                         100-5000  3-4                         500-98098  4-5                         1000-66453  5-6                         [id number]  6-8                         [id number]  8-12                        [id number]      Comprehensive metabolic panel [[id number]] Collected: 05/31/25 1411    Lab Status: Final result Specimen: Blood from Arm, Right Updated: 05/31/25 1446     Sodium 137 mmol/L      Potassium 3.7 mmol/L      Chloride 105 mmol/L      CO2 23 mmol/L      ANION GAP 9 mmol/L      BUN 13 mg/dL      Creatinine 0.75 mg/dL      Glucose 92 mg/dL      Calcium 9.2 mg/dL      AST 14 U/L      ALT 16  U/L      Alkaline Phosphatase 65 U/L      Total Protein 7.6 g/dL      Albumin 4.4 g/dL      Total Bilirubin 0.40 mg/dL      eGFR 108 ml/min/1.73sq m     Narrative:      National Kid[employer] guidelines for Chronic Kidney Disease (CKD):     Stage 1 with normal or high GFR (GFR > 90 mL/min/1.73 square meters)    Stage 2 Mild CKD (GFR = 60-89 mL/min/1.73 square meters)    Stage 3A Moderate CKD (GFR = 45-59 mL/min/1.73 square meters)    Stage 3B Moderate CKD (GFR = 30-44 mL/min/1.73 square meters)    Stage 4 Severe CKD (GFR = 15-29 mL/min/1.73 square meters)    Stage 5 End Stage CKD (GFR <15 mL/min/1.73 square meters)  Note: GFR calculation is accurate only with a steady state creatinine    CBC and differential [[id number]] Collected: 05/31/25 1411    Lab Status: Final result Specimen: Blood from Arm, Right Updated: 05/31/25 1430     WBC 9.63 Thousand/uL      RBC 4.52 Million/uL      Hemoglobin 13.7 g/dL      Hematocrit 41.4 %      MCV 92 fL      MCH 30.3 pg      MCHC 33.1 g/dL      RDW 13.3 %      MPV 10.5 fL      Platelets 285 Thousands/uL      nRBC 0 /100 WBCs      Segmented % 66 %      Immature Grans % 0 %      Lymphocytes % 29 %      Monocytes % 5 %      Eosinophils Relative 0 %      Basophils Relative 0 %      Absolute Neutrophils 6.27 Thousands/µL      Absolute Immature Grans 0.03 Thousand/uL      Absolute Lymphocytes 2.79 Thousands/µL      Absolute Monocytes 0.46 Thousand/µL      Eosinophils Absolute 0.04 Thousand/µL      Basophils Absolute 0.04 Thousands/µL     Urine Microscopic [[id number]]  (Normal) Collected: 05/31/25 1411    Lab Status: Final result Specimen: Urine, Clean Catch Updated: 05/31/25 1426     RBC, UA 1-2 /hpf      WBC, UA None Seen /hpf      Epithelial Cells Occasional /hpf      Bacteria, UA None Seen /hpf     UA (URINE) with reflex to Scope [[id number]]  (Abnormal) Collected: 05/31/25 1411    Lab Status: Final result Specimen: Urine, Clean Catch Updated: 05/31/25 1425     Color, UA  Colorless     Clarity, UA Clear     Specific Gravity, UA 1.002     pH, UA 6.0     Leukocytes, UA Negative     Nitrite, UA Negative     Protein, UA Negative mg/dl      Glucose, UA Negative mg/dl      Ketones, UA Negative mg/dl      Urobilinogen, UA <2.0 mg/dl      Bilirubin, UA Negative     Occult Blood, UA Moderate            US OB pregnancy limited with transvaginal   Final Interpretation by Carly King MD ([id number])      No intrauterine gestation or adnexal mass identified.      Differential remains early IUP, spontaneous  and ectopic pregnancy. Correlate with serial quantitative bHCG.      Workstation performed: [id number]             Procedures    ED Medication and Procedure Management   Prior to Admission Medications   Prescriptions Last Dose Informant Patient Reported? Taking?   Prenatal Vit-Fe Fumarate-FA (PRENATAL VITAMIN PO)  Self Yes No   Sig: Take by mouth   busPIRone (BUSPAR) 10 mg tablet  Self No No   Sig: TAKE 1 TABLET (10 MG TOTAL) BY MOUTH EVERY MORNING.   busPIRone (BUSPAR) 15 mg tablet  Self No No   Sig: Take 1 tablet (15 mg total) by mouth every evening   norgestimate-ethinyl estradiol (Tri-Lo-Estarylla) 0.18/0.215/0.25 MG-25 MCG per tablet  Self No No   Sig: Take 1 tablet by mouth daily   Patient not taking: Reported on 2025   pantoprazole (PROTONIX) 40 mg tablet   No No   Sig: Take 1 tablet (40 mg total) by mouth daily      Facility-Administered Medications: None     Patient's Medications   Discharge Prescriptions    No medications on file     Outpatient Discharge Orders   hCG, quantitative   Standing Status: Future Standing Exp. Date: 25     ED SEPSIS DOCUMENTATION   Time reflects when diagnosis was documented in both MDM as applicable and the Disposition within this note       Time User Action Codes Description Comment    2025  3:39 PM Lamberto Maloney Add [O20.0] Threatened                       [1]   Past Medical History:  Diagnosis Date     Anxiety     Decreased fetal movements in third trimester 2024    Fetal macrosomia during pregnancy 2024    GERD (gastroesophageal reflux disease)     Headache(784.0)     Stress headaches    Obesity affecting pregnancy in third trimester 2023    Continue ASA until 36 weeks       Ovarian cyst     last assessed 2014    Varicella     had vaccine    Velamentous insertion of umbilical cord in third trimester 2024    Planning weekly NST w/ FRANCI at 36 weeks     [2]   Past Surgical History:  Procedure Laterality Date     SECTION  2024    PA  DELIVERY ONLY N/A 2024    Procedure:  SECTION ();  Surgeon: Suha Irwin MD;  Location: [facility] ;  Service: Obstetrics    WISDOM TOOTH EXTRACTION     [3]   Family History  Problem Relation Name Age of Onset    Gallbladder disease Mother Maddi     Migraines Mother Maddi     Hypertension Father Stefan     Asthma Father Stefan     Endometriosis Sister          Partial Hyst    Ulcerative colitis Maternal Grandmother      Other Maternal Grandfather          cardiac disorder   [4]   Social History  Tobacco Use    Smoking status: Never     Passive exposure: Never    Smokeless tobacco: Never   Vaping Use    Vaping status: Never Used   Substance Use Topics    Alcohol use: Yes     Alcohol/week: 2.0 standard drinks of alcohol     Types: 2 Glasses of wine per week     Comment: I drink socially    Drug use: Never        Lamberto Maloney MD  25 [id number]

## 2025-06-02 ENCOUNTER — NURSE TRIAGE (OUTPATIENT)
Dept: OTHER | Facility: OTHER | Age: 30
End: 2025-06-02

## 2025-06-02 ENCOUNTER — APPOINTMENT (OUTPATIENT)
Dept: LAB | Facility: MEDICAL CENTER | Age: 30
End: 2025-06-02
Attending: EMERGENCY MEDICINE
Payer: COMMERCIAL

## 2025-06-02 DIAGNOSIS — O20.0 THREATENED ABORTION: ICD-10-CM

## 2025-06-02 DIAGNOSIS — O20.9 FIRST TRIMESTER BLEEDING: Primary | ICD-10-CM

## 2025-06-02 LAB — B-HCG SERPL-ACNC: 20.4 MIU/ML (ref 0–5)

## 2025-06-02 PROCEDURE — 36415 COLL VENOUS BLD VENIPUNCTURE: CPT

## 2025-06-02 PROCEDURE — 84702 CHORIONIC GONADOTROPIN TEST: CPT

## 2025-06-03 NOTE — TELEPHONE ENCOUNTER
"REASON FOR CONVERSATION: Abnormal Lab  Esc to Dr Almeida: Pt  is 5 weeks pregnant and was told she is having a miscarriage  and having concern for HCG level went from 16.1 to 20.4. She would like to know if she should be fearful of an possible ectopic pregnancy. Please advise   SYMPTOMS: Period type bleeding. Less than a pad per hour.       OTHER HEALTH INFORMATION: 5 weeks pregnant    PROTOCOL DISPOSITION:  Now    CARE ADVICE PROVIDED: As per on call Dr Almeida an  ectopic pregnancy can occur at any HCG level but she is below the level where imaging would be useful to determine the pregnancy location. She should continue to monitor for any symptoms of ectopic pregnancy and continue to trend her HCG to zero.     PRACTICE FOLLOW-UP: Pt to continue to follow HCG level trending to zero as per Dr Almeida . Please call pt regarding any follow up blood work.       Answer Assessment - Initial Assessment Questions  1. REASON FOR CALL: \"What is the main reason for your call?\" or \"How can I best help you?\"      HCG level went from 16.1 to 20.4  2. SYMPTOMS : \"Do you have any symptoms?\"       Period type bleeding. Less than a pad per hour.   3. OTHER QUESTIONS: \"Do you have any other questions?\"      Do I need to be concerned with ectopic  pregnancy    Protocols used: Information Only Call - No Triage-Adult-    "
"Regardin Weeks Pregnant, Bleeding, told was having a  miscarriage. HCG Level Suzan  ----- Message from Cyndi MORA sent at 2025  6:05 PM EDT -----  \" I had bleeding at 5 weeks pregnant and was told I am having a miscarriage. I came back today to get blood work to check my HCG levels and it went up from 16.3 to 20, I am nervous about maybe ep topic Pregnancy.\"    "
LMP 25- , 5 weeks 6 days    ED visit  for heavy vaginal bleeding, patient was told she is having miscarriage.  US in ED no IUP/ HCG  16.1, HCG  20.4  Patient states she the bleeding today is more like spotting, not passing tissue, no clots. Pain is very minimal, none now. Patient is concerned HCG level slight rise possibility of ectopic.D&V scheduled .  ESC Dr. Hatch -I would advise repeating level and monitor bleeding and pain- if she has any concerns what so ever like increasing pain or bleeding she should return call.  hcg level to be repeated in 48 hours for trending.  8:48 returned call to patient and reviewed to complete labs again in 48 hours. Orders placed. Again discussed s/sx such as pelvic or shoulder pain, increased bleeding, feeling dizzy or lightheaded and to call back/ seek ED/UC- patient verbalzied understanding and thankful for follow up.   
No

## 2025-06-04 ENCOUNTER — APPOINTMENT (OUTPATIENT)
Dept: LAB | Facility: MEDICAL CENTER | Age: 30
End: 2025-06-04
Payer: COMMERCIAL

## 2025-06-04 DIAGNOSIS — O20.9 FIRST TRIMESTER BLEEDING: ICD-10-CM

## 2025-06-04 LAB — B-HCG SERPL-ACNC: 2.3 MIU/ML (ref 0–5)

## 2025-06-04 PROCEDURE — 36415 COLL VENOUS BLD VENIPUNCTURE: CPT

## 2025-06-04 PROCEDURE — 84702 CHORIONIC GONADOTROPIN TEST: CPT

## 2025-06-05 ENCOUNTER — RESULTS FOLLOW-UP (OUTPATIENT)
Dept: OBGYN CLINIC | Facility: CLINIC | Age: 30
End: 2025-06-05

## 2025-06-05 ENCOUNTER — TELEPHONE (OUTPATIENT)
Dept: OBGYN CLINIC | Facility: CLINIC | Age: 30
End: 2025-06-05

## 2025-06-20 DIAGNOSIS — K21.9 GASTROESOPHAGEAL REFLUX DISEASE, UNSPECIFIED WHETHER ESOPHAGITIS PRESENT: ICD-10-CM

## 2025-06-21 RX ORDER — PANTOPRAZOLE SODIUM 40 MG/1
40 TABLET, DELAYED RELEASE ORAL DAILY
Qty: 90 TABLET | Refills: 1 | Status: SHIPPED | OUTPATIENT
Start: 2025-06-21

## (undated) DEVICE — GLOVE INDICATOR PI UNDERGLOVE SZ 7 BLUE

## (undated) DEVICE — TELFA ADHESIVE ISLAND DRESSING: Brand: TELFA

## (undated) DEVICE — SUT VICRYL 0 CT-1 36 IN J946H

## (undated) DEVICE — SUT VICRYL 2-0 CT-1 27 IN J259H

## (undated) DEVICE — GAUZE SPONGES,16 PLY: Brand: CURITY

## (undated) DEVICE — TELFA NON-ADHERENT ABSORBENT DRESSING: Brand: TELFA

## (undated) DEVICE — ABDOMINAL PAD: Brand: DERMACEA

## (undated) DEVICE — SUT MONOCRYL 0 CTX 36 IN Y398H

## (undated) DEVICE — PACK C-SECTION PBDS

## (undated) DEVICE — SKIN MARKER DUAL TIP WITH RULER CAP, FLEXIBLE RULER AND LABELS: Brand: DEVON

## (undated) DEVICE — CHLORAPREP HI-LITE 26ML ORANGE

## (undated) DEVICE — Device

## (undated) DEVICE — MICRO HVTSA, 0.5G AND HVTSA SOURCEMARK PRODUCT CODE M1206 AND M1206-01: Brand: EXOFIN MICRO HVTSA, 0.5G

## (undated) DEVICE — GLOVE PI ULTRA TOUCH SZ.6.5